# Patient Record
Sex: FEMALE | Race: WHITE | NOT HISPANIC OR LATINO | Employment: OTHER | URBAN - METROPOLITAN AREA
[De-identification: names, ages, dates, MRNs, and addresses within clinical notes are randomized per-mention and may not be internally consistent; named-entity substitution may affect disease eponyms.]

---

## 2017-01-16 ENCOUNTER — OFFICE VISIT (OUTPATIENT)
Dept: CARDIOLOGY | Facility: CLINIC | Age: 57
End: 2017-01-16
Payer: MEDICARE

## 2017-01-16 ENCOUNTER — HOSPITAL ENCOUNTER (OUTPATIENT)
Dept: RADIOLOGY | Facility: HOSPITAL | Age: 57
Discharge: HOME | End: 2017-01-16
Attending: INTERNAL MEDICINE
Payer: MEDICARE

## 2017-01-16 ENCOUNTER — HOSPITAL ENCOUNTER (OUTPATIENT)
Dept: CARDIOLOGY | Facility: CLINIC | Age: 57
Discharge: HOME | End: 2017-01-16
Payer: MEDICARE

## 2017-01-16 VITALS
DIASTOLIC BLOOD PRESSURE: 72 MMHG | WEIGHT: 145.75 LBS | HEIGHT: 65 IN | HEART RATE: 65 BPM | SYSTOLIC BLOOD PRESSURE: 142 MMHG | BODY MASS INDEX: 24.28 KG/M2

## 2017-01-16 DIAGNOSIS — I71.20 THORACIC AORTIC ANEURYSM WITHOUT RUPTURE: ICD-10-CM

## 2017-01-16 DIAGNOSIS — Z98.890 S/P THORACIC AORTIC ANEURYSM REPAIR: Chronic | ICD-10-CM

## 2017-01-16 DIAGNOSIS — Z95.0 PACEMAKER: Chronic | ICD-10-CM

## 2017-01-16 DIAGNOSIS — I71.21 ASCENDING AORTIC ANEURYSM: Primary | ICD-10-CM

## 2017-01-16 DIAGNOSIS — Z86.79 S/P THORACIC AORTIC ANEURYSM REPAIR: Chronic | ICD-10-CM

## 2017-01-16 DIAGNOSIS — Z87.74 S/P VSD REPAIR: Chronic | ICD-10-CM

## 2017-01-16 DIAGNOSIS — Z95.2 S/P AVR: Chronic | ICD-10-CM

## 2017-01-16 LAB
AORTIC VALVE REGURGITATION: ABNORMAL
DIASTOLIC DYSFUNCTION: YES
ESTIMATED PA SYSTOLIC PRESSURE: 42
MITRAL VALVE REGURGITATION: ABNORMAL
RETIRED EF AND QEF - SEE NOTES: 60 (ref 55–65)
TRICUSPID VALVE REGURGITATION: ABNORMAL

## 2017-01-16 PROCEDURE — 99214 OFFICE O/P EST MOD 30 MIN: CPT | Mod: S$PBB,,, | Performed by: INTERNAL MEDICINE

## 2017-01-16 PROCEDURE — 71250 CT THORAX DX C-: CPT | Mod: 26,GC,, | Performed by: RADIOLOGY

## 2017-01-16 PROCEDURE — 99999 PR PBB SHADOW E&M-EST. PATIENT-LVL III: CPT | Mod: PBBFAC,,, | Performed by: INTERNAL MEDICINE

## 2017-01-16 PROCEDURE — 93306 TTE W/DOPPLER COMPLETE: CPT | Mod: 26,S$PBB,, | Performed by: INTERNAL MEDICINE

## 2017-01-16 PROCEDURE — 99213 OFFICE O/P EST LOW 20 MIN: CPT | Mod: PBBFAC | Performed by: INTERNAL MEDICINE

## 2017-01-16 RX ORDER — METOPROLOL SUCCINATE 50 MG/1
TABLET, EXTENDED RELEASE ORAL
Status: ON HOLD | COMMUNITY
End: 2023-09-14 | Stop reason: HOSPADM

## 2017-01-16 RX ORDER — SOTALOL HYDROCHLORIDE 80 MG/1
80 TABLET ORAL 2 TIMES DAILY
Status: ON HOLD | COMMUNITY
Start: 2017-01-04 | End: 2022-05-23 | Stop reason: SDUPTHER

## 2017-01-16 RX ORDER — VALSARTAN AND HYDROCHLOROTHIAZIDE 320; 12.5 MG/1; MG/1
1 TABLET, FILM COATED ORAL DAILY
COMMUNITY
End: 2019-01-28

## 2017-01-16 RX ORDER — ESOMEPRAZOLE MAGNESIUM 40 MG/1
40 CAPSULE, DELAYED RELEASE ORAL 2 TIMES DAILY
COMMUNITY
Start: 2016-12-19 | End: 2019-01-28

## 2017-01-16 NOTE — MR AVS SNAPSHOT
[facility]  [address]  [facility] [address]  Phone: [phone]                  Naina Keyes   2017 9:40 AM   Office Visit    Description:  Female : 1960   Provider:  Colton Gallegos MD   Department:  [facility]           Reason for Visit     Aortic Stenosis           Diagnoses this Visit        Comments    Ascending aortic aneurysm    -  Primary     Pacemaker         S/P AVR         S/P thoracic aortic aneurysm repair         S/P VSD repair                To Do List           Goals (5 Years of Data)     None      [facility]     [facility] -  Assistance  Registered nurses in the [facility] provide clinical advisement, health education, appointment booking, and other advisory services.  Call for this free service at [phone].             Medications           Message regarding Medications     Verify the changes and/or additions to your medication regime listed below are the same as discussed with your clinician today.  If any of these changes or additions are incorrect, please notify your healthcare provider.        STOP taking these medications     valsartan-hydrochlorothiazide (DIOVAN-HCT) 160-12.5 mg per tablet Take 1 tablet by mouth once daily.      ESTROGENS, CONJUGATED (PREMARIN ORAL) Take 0.625 mg by mouth once daily.            Verify that the below list of medications is an accurate representation of the medications you are currently taking.  If none reported, the list may be blank. If incorrect, please contact your healthcare provider. Carry this list with you in case of emergency.           Current Medications     ALBUTEROL SULFATE (VENTOLIN HFA INHL) Inhale into the lungs as needed.    alprazolam (XANAX) 0.25 MG tablet Take 0.5 mg by mouth once daily.     azelastine (OPTIVAR) 0.05 % ophthalmic solution Place 1 drop into both eyes as needed.     CITALOPRAM HYDROBROMIDE (CELEXA ORAL) Take  "10 mg/day by mouth 2 (two) times daily.     CYCLOBENZAPRINE HCL (FLEXERIL ORAL) Take by mouth as needed.     magnesium 200 mg Tab Take 400 mg by mouth once daily.    metoprolol succinate (TOPROL-XL) 100 MG 24 hr tablet Take 100 mg by mouth once daily. Pt  Take  1/2  Tab  In  Am  And  Pm    NEXIUM 40 mg capsule 40 mg 2 (two) times daily.     olopatadine (PATANASE) 0.6 % Spry by Nasal route once daily.      potassium chloride (MICRO-K) 10 MEQ CpSR Take 10 mEq by mouth 2 (two) times daily.     sotalol (BETAPACE) 80 MG tablet 80 mg 2 (two) times daily.     valsartan-hydrochlorothiazide (DIOVAN-HCT) 320-12.5 mg per tablet Take 1 tablet by mouth once daily.    WARFARIN SODIUM (COUMADIN ORAL) Take by mouth. Pt is taking 5 mg daily except on Saturday and Monday pt takes 2.5mg    zolpidem (AMBIEN) 10 mg tablet Take 10 mg by mouth nightly as needed.             Clinical Reference Information           Vital Signs - Last Recorded  Most recent update: 1/16/2017  9:49 AM by Dominick Pearl MA    BP Pulse Ht Wt BMI    (!) 142/72 65 5' 5" (1.651 m) 66.1 kg (145 lb 11.6 oz) 24.25 kg/m2      Blood Pressure          Most Recent Value    BP  (!)  142/72 [LT]      Allergies as of 1/16/2017     Iodine      Immunizations Administered on Date of Encounter - 1/16/2017     None      "

## 2017-01-16 NOTE — PROGRESS NOTES
Subjective:    Patient ID:  Naina Keyes is a 56 y.o. female who presents for follow-up of TAA graft leak closure with Amplatz    REF:  Kenny Newsome    [facility]  Ms. Keyes is essentially unchanged clinically:  57 y/o lady with hx of AVR for endocarditis and subsequent leak of graft who presents today for annual f/u s/p 10mm AGA septal occluder placed with partial closure of leak in June 2009 for a left post suture line aortic leak and peter fistula communicating to right atrium the occurred after thoracic aortic aneurysm repair with Dacron graft in 2007. She continues to have chronic fatigue, FLOOD, and CP that is not lifestyle limiting. No CHF in the past year.     CT without contrast today shows maximum diameter of the ascending aorta is 4.2cm.  No change since 2013.     Review of Systems   Constitution: Positive for malaise/fatigue. Negative for chills, diaphoresis, fever, weakness, weight gain and weight loss.        Complains of weak and dizzy spells.  Has a PPM put in by Dr. Newsome.   HENT: Negative for congestion, headaches and sore throat.    Eyes: Negative for blurred vision, vision loss in left eye, vision loss in right eye and visual disturbance.   Cardiovascular: Positive for dyspnea on exertion. Negative for chest pain, claudication, cyanosis, irregular heartbeat, leg swelling, near-syncope, orthopnea, palpitations, paroxysmal nocturnal dyspnea and syncope.   Respiratory: Positive for shortness of breath. Negative for cough, hemoptysis, sputum production and wheezing.    Endocrine: Negative for cold intolerance, heat intolerance and polyuria.   Hematologic/Lymphatic: Negative for adenopathy and bleeding problem. Does not bruise/bleed easily.   Skin: Negative for itching and rash.   Musculoskeletal: Negative for falls, joint swelling, muscle cramps, muscle weakness and myalgias.   Gastrointestinal: Negative for abdominal pain, change in bowel habit, constipation, diarrhea, hematochezia, melena, nausea and  vomiting.   Genitourinary: Negative for bladder incontinence, dysuria and hematuria.   Neurological: Negative for dizziness, focal weakness, light-headedness, loss of balance and numbness.   Psychiatric/Behavioral: Negative for altered mental status and depression. The patient is not nervous/anxious.      Past Medical History   Diagnosis Date    Heart disease     Hypertension     Pacemaker 11/25/2013    S/P Mechanical AVR 11/25/2013    S/P thoracic aortic aneurysm repair with Dacron graft (2007) 11/25/2013    S/P VSD surgical patch repair -1997 11/25/2013     Current Outpatient Prescriptions on File Prior to Visit   Medication Sig Dispense Refill    alprazolam (XANAX) 0.25 MG tablet Take 0.5 mg by mouth once daily.       azelastine (OPTIVAR) 0.05 % ophthalmic solution Place 1 drop into both eyes as needed.       CITALOPRAM HYDROBROMIDE (CELEXA ORAL) Take 10 mg/day by mouth 2 (two) times daily.       CYCLOBENZAPRINE HCL (FLEXERIL ORAL) Take by mouth as needed.       magnesium 200 mg Tab Take 400 mg by mouth once daily.      olopatadine (PATANASE) 0.6 % Spry by Nasal route once daily.        potassium chloride (MICRO-K) 10 MEQ CpSR Take 10 mEq by mouth 2 (two) times daily.       WARFARIN SODIUM (COUMADIN ORAL) Take by mouth. Pt is taking 5 mg daily except on Saturday and Monday pt takes 2.5mg      zolpidem (AMBIEN) 10 mg tablet Take 10 mg by mouth nightly as needed.        [DISCONTINUED] ESTROGENS, CONJUGATED (PREMARIN ORAL) Take 0.625 mg by mouth once daily.       [DISCONTINUED] metoprolol succinate (TOPROL-XL) 25 MG 24 hr tablet Take 100 mg by mouth once daily. 50mg in a.m. And 100mg in evening      [DISCONTINUED] valsartan-hydrochlorothiazide (DIOVAN-HCT) 160-12.5 mg per tablet Take 1 tablet by mouth once daily.         Current Facility-Administered Medications on File Prior to Visit   Medication Dose Route Frequency Provider Last Rate Last Dose    dexamethasone injection 2 mg  2 mg Other Once  "Alli Simeon, [facility]         Vitals:    01/16/17 0929 01/16/17 0948   BP: (!) 140/66 (!) 142/72   Pulse: 65    Weight: 66.1 kg (145 lb 11.6 oz)    Height: 5' 5" (1.651 m)      Body mass index is 24.25 kg/(m^2).       Objective:    Physical Exam   Constitutional: She is oriented to person, place, and time. She appears well-developed and well-nourished. No distress.   HENT:   Head: Normocephalic and atraumatic.   Mouth/Throat: Oropharynx is clear and moist.   Eyes: Conjunctivae and EOM are normal. Pupils are equal, round, and reactive to light. No scleral icterus.   Neck: Neck supple. No JVD present. No tracheal deviation present.   Cardiovascular: Normal rate and regular rhythm.  Exam reveals no gallop and no friction rub.    Murmur heard.  Pulses:       Carotid pulses are 2+ on the right side, and 2+ on the left side.       Femoral pulses are 2+ on the right side, and 2+ on the left side.  Pulmonary/Chest: Effort normal and breath sounds normal. No respiratory distress. She has no wheezes. She has no rales. She exhibits no tenderness.   Abdominal: Soft. Bowel sounds are normal. She exhibits no distension. There is no hepatosplenomegaly. There is no tenderness.   Musculoskeletal: She exhibits no edema or tenderness.   Neurological: She is alert and oriented to person, place, and time.   Skin: Skin is warm and dry. No rash noted. No erythema.   Psychiatric: She has a normal mood and affect. Her behavior is normal. Judgment and thought content normal.         Assessment:       1. 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  One episode of CHF  2. 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 On Coumadin.   3. Pacemaker - no issues  4. " S/P VSD surgical patch repair -1997   5. Ascending aortic aneurysm - 4 cm, stable     Plan:       F/U in 1 year with non-contrast CT (history of shock with IV contrast) and 2DE with CFD  If she has recurrent CHF will consider closing the surgical Ao to RA AVF.      Staff:  I have personally taken the history and examined this patient and agree with the fellow's note as stated above and amended it accordingly :-) She has no change in symptoms and still has an aortic to RA shunt which was surgically created at the time of her aortic surgery.  She has stable fatigue and FLOOD with maximal exertion.  Will discuss the case with Dr. Mackay.

## 2017-11-09 DIAGNOSIS — Z95.2 S/P AVR: Chronic | ICD-10-CM

## 2017-11-09 DIAGNOSIS — Z86.79 S/P THORACIC AORTIC ANEURYSM REPAIR: Primary | Chronic | ICD-10-CM

## 2017-11-09 DIAGNOSIS — Z98.890 S/P THORACIC AORTIC ANEURYSM REPAIR: Primary | Chronic | ICD-10-CM

## 2018-01-22 ENCOUNTER — OFFICE VISIT (OUTPATIENT)
Dept: CARDIOLOGY | Facility: CLINIC | Age: 58
End: 2018-01-22
Payer: MEDICARE

## 2018-01-22 ENCOUNTER — HOSPITAL ENCOUNTER (OUTPATIENT)
Dept: RADIOLOGY | Facility: HOSPITAL | Age: 58
Discharge: HOME | End: 2018-01-22
Attending: NURSE PRACTITIONER
Payer: MEDICARE

## 2018-01-22 ENCOUNTER — HOSPITAL ENCOUNTER (OUTPATIENT)
Dept: CARDIOLOGY | Facility: CLINIC | Age: 58
Discharge: HOME | End: 2018-01-22
Attending: NURSE PRACTITIONER
Payer: MEDICARE

## 2018-01-22 VITALS
DIASTOLIC BLOOD PRESSURE: 65 MMHG | HEART RATE: 65 BPM | WEIGHT: 152.31 LBS | HEIGHT: 65 IN | BODY MASS INDEX: 25.38 KG/M2 | OXYGEN SATURATION: 97 % | SYSTOLIC BLOOD PRESSURE: 136 MMHG

## 2018-01-22 DIAGNOSIS — Z87.74 S/P VSD REPAIR: Chronic | ICD-10-CM

## 2018-01-22 DIAGNOSIS — Z86.79 S/P THORACIC AORTIC ANEURYSM REPAIR: Chronic | ICD-10-CM

## 2018-01-22 DIAGNOSIS — Z98.890 S/P THORACIC AORTIC ANEURYSM REPAIR: Chronic | ICD-10-CM

## 2018-01-22 DIAGNOSIS — Z95.2 S/P AVR: Chronic | ICD-10-CM

## 2018-01-22 DIAGNOSIS — Z95.2 S/P AVR: Primary | Chronic | ICD-10-CM

## 2018-01-22 DIAGNOSIS — I71.21 ASCENDING AORTIC ANEURYSM: ICD-10-CM

## 2018-01-22 DIAGNOSIS — Z95.0 PACEMAKER: Chronic | ICD-10-CM

## 2018-01-22 LAB
DIASTOLIC DYSFUNCTION: YES
ESTIMATED PA SYSTOLIC PRESSURE: 28.2
MITRAL VALVE MOBILITY: NORMAL
RETIRED EF AND QEF - SEE NOTES: 55 (ref 55–65)
TRICUSPID VALVE REGURGITATION: ABNORMAL

## 2018-01-22 PROCEDURE — 71250 CT THORAX DX C-: CPT | Mod: 26,GC,, | Performed by: RADIOLOGY

## 2018-01-22 PROCEDURE — 99214 OFFICE O/P EST MOD 30 MIN: CPT | Mod: S$PBB,,, | Performed by: INTERNAL MEDICINE

## 2018-01-22 PROCEDURE — 99215 OFFICE O/P EST HI 40 MIN: CPT | Mod: PBBFAC,25 | Performed by: INTERNAL MEDICINE

## 2018-01-22 PROCEDURE — 71250 CT THORAX DX C-: CPT | Mod: TC

## 2018-01-22 PROCEDURE — 99999 PR PBB SHADOW E&M-EST. PATIENT-LVL V: CPT | Mod: PBBFAC,,, | Performed by: INTERNAL MEDICINE

## 2018-01-22 PROCEDURE — 93306 TTE W/DOPPLER COMPLETE: CPT | Mod: PBBFAC | Performed by: INTERNAL MEDICINE

## 2018-01-22 NOTE — PROGRESS NOTES
Subjective:    Patient ID:  Naina Keyes is a 57 y.o. female who presents for follow-up of TAA graft leak closure with Amplatz    REF:  Kenny Newsome    [facility]  Ms. Keyes is essentially unchanged clinically:  55 y/o lady with hx of AVR for endocarditis and subsequent leak of graft who presents today for annual f/u s/p 10mm AGA septal occluder placed with partial closure of leak in June 2009 for a left post suture line aortic leak and peter fistula communicating to right atrium the occurred after thoracic aortic aneurysm repair with Dacron graft in 2007. She continues to have chronic fatigue, FLOOD, and CP that is not lifestyle limiting. No CHF in the past year.     CT without contrast today shows maximum diameter of the ascending aorta is 4.1 cm.  No change since 2013.     Review of Systems   Constitution: Positive for malaise/fatigue. Negative for chills, diaphoresis, fever, weakness, weight gain and weight loss.        Complains of weak and dizzy spells.  Has a PPM put in by Dr. Newsome.   HENT: Negative for congestion and sore throat.    Eyes: Negative for blurred vision, vision loss in left eye, vision loss in right eye and visual disturbance.   Cardiovascular: Positive for dyspnea on exertion. Negative for chest pain, claudication, cyanosis, irregular heartbeat, leg swelling, near-syncope, orthopnea, palpitations, paroxysmal nocturnal dyspnea and syncope.   Respiratory: Positive for shortness of breath. Negative for cough, hemoptysis, sputum production and wheezing.    Endocrine: Negative for cold intolerance, heat intolerance and polyuria.   Hematologic/Lymphatic: Negative for adenopathy and bleeding problem. Does not bruise/bleed easily.   Skin: Negative for itching and rash.   Musculoskeletal: Negative for falls, joint swelling, muscle cramps, muscle weakness and myalgias.   Gastrointestinal: Negative for abdominal pain, change in bowel habit, constipation, diarrhea, hematochezia, melena, nausea and vomiting.    Genitourinary: Negative for bladder incontinence, dysuria and hematuria.   Neurological: Negative for dizziness, focal weakness, headaches, light-headedness, loss of balance and numbness.   Psychiatric/Behavioral: Negative for altered mental status and depression. The patient is not nervous/anxious.      Past Medical History:   Diagnosis Date    Heart disease     Hypertension     Pacemaker 11/25/2013    S/P Mechanical AVR 11/25/2013    S/P thoracic aortic aneurysm repair with Dacron graft (2007) 11/25/2013    S/P VSD surgical patch repair -1997 11/25/2013     Current Outpatient Prescriptions on File Prior to Visit   Medication Sig Dispense Refill    ALBUTEROL SULFATE (VENTOLIN HFA INHL) Inhale into the lungs as needed.      alprazolam (XANAX) 0.25 MG tablet Take 0.5 mg by mouth once daily.       magnesium 200 mg Tab Take 400 mg by mouth once daily.      metoprolol succinate (TOPROL-XL) 100 MG 24 hr tablet Take 100 mg by mouth once daily. Pt  Take  1/2  Tab  In  Am  And  Pm      NEXIUM 40 mg capsule 40 mg 2 (two) times daily.       potassium chloride (MICRO-K) 10 MEQ CpSR Take 10 mEq by mouth 2 (two) times daily.       sotalol (BETAPACE) 80 MG tablet 80 mg 2 (two) times daily.       valsartan-hydrochlorothiazide (DIOVAN-HCT) 320-12.5 mg per tablet Take 1 tablet by mouth once daily.      WARFARIN SODIUM (COUMADIN ORAL) Take by mouth. Pt is taking 5 mg daily except on Saturday and Monday pt takes 2.5mg      zolpidem (AMBIEN) 10 mg tablet Take 10 mg by mouth nightly as needed.        azelastine (OPTIVAR) 0.05 % ophthalmic solution Place 1 drop into both eyes as needed.       CITALOPRAM HYDROBROMIDE (CELEXA ORAL) Take 10 mg/day by mouth 2 (two) times daily.       CYCLOBENZAPRINE HCL (FLEXERIL ORAL) Take by mouth as needed.       olopatadine (PATANASE) 0.6 % Spry by Nasal route once daily.         Current Facility-Administered Medications on File Prior to Visit   Medication Dose Route Frequency  "Provider Last Rate Last Dose    dexamethasone injection 2 mg  2 mg Other Once Alli SimeonYOHANA         Vitals:    01/22/18 1122 01/22/18 1125   BP: 136/68 136/65   BP Location: Left arm Right arm   Patient Position: Sitting Sitting   BP Method: Large (Automatic) Large (Automatic)   Pulse: 65 65   SpO2: 97% 97%   Weight: 69.1 kg (152 lb 5.4 oz) 69.1 kg (152 lb 5.4 oz)   Height: 5' 5" (1.651 m) 5' 5" (1.651 m)     Body mass index is 25.35 kg/m².       Objective:    Physical Exam   Constitutional: She is oriented to person, place, and time. She appears well-developed and well-nourished. No distress.   HENT:   Head: Normocephalic and atraumatic.   Mouth/Throat: Oropharynx is clear and moist.   Eyes: Conjunctivae and EOM are normal. Pupils are equal, round, and reactive to light. No scleral icterus.   Neck: Neck supple. No JVD present. No tracheal deviation present.   Cardiovascular: Normal rate and regular rhythm.  Exam reveals no gallop and no friction rub.    Murmur heard.  Pulses:       Carotid pulses are 2+ on the right side, and 2+ on the left side.       Femoral pulses are 2+ on the right side, and 2+ on the left side.  Pulmonary/Chest: Effort normal and breath sounds normal. No respiratory distress. She has no wheezes. She has no rales. She exhibits no tenderness.   Abdominal: Soft. Bowel sounds are normal. She exhibits no distension. There is no hepatosplenomegaly. There is no tenderness.   Musculoskeletal: She exhibits no edema or tenderness.   Neurological: She is alert and oriented to person, place, and time.   Skin: Skin is warm and dry. No rash noted. No erythema.   Psychiatric: She has a normal mood and affect. Her behavior is normal. Judgment and thought content normal.         Assessment:       1. S/P thoracic aortic aneurysm repair with Dacron graft (2007) with subsequent left post suture line aortic leak and peter  fistula communicating to right atrium s/p 10mm AGA septal occluder placed with " partial closure of leak in June 2009 .   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  One episode of CHF  2. 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 On Coumadin.   3. Pacemaker - no issues  4. S/P VSD surgical patch repair -1997   5. Ascending aortic aneurysm - 4 cm, stable     Plan:       F/U in 1 year with non-contrast CT (history of shock with IV contrast) and 2DE with CFD  If she has recurrent CHF will consider closing the surgical Ao to RA AVF.    Staff:  I have personally taken the history and examined this patient and agree with the fellow's note as stated above and amended it accordingly :-)

## 2018-12-05 DIAGNOSIS — I71.21 ASCENDING AORTIC ANEURYSM: Primary | ICD-10-CM

## 2019-01-28 ENCOUNTER — HOSPITAL ENCOUNTER (OUTPATIENT)
Dept: CARDIOLOGY | Facility: CLINIC | Age: 59
Discharge: HOME | End: 2019-01-28
Attending: INTERNAL MEDICINE
Payer: MEDICARE

## 2019-01-28 ENCOUNTER — OFFICE VISIT (OUTPATIENT)
Dept: CARDIOLOGY | Facility: CLINIC | Age: 59
End: 2019-01-28
Payer: MEDICARE

## 2019-01-28 ENCOUNTER — HOSPITAL ENCOUNTER (OUTPATIENT)
Dept: RADIOLOGY | Facility: HOSPITAL | Age: 59
Discharge: HOME | End: 2019-01-28
Attending: INTERNAL MEDICINE
Payer: MEDICARE

## 2019-01-28 VITALS
BODY MASS INDEX: 24.2 KG/M2 | OXYGEN SATURATION: 100 % | DIASTOLIC BLOOD PRESSURE: 74 MMHG | WEIGHT: 150.56 LBS | HEART RATE: 65 BPM | SYSTOLIC BLOOD PRESSURE: 162 MMHG | HEIGHT: 66 IN

## 2019-01-28 VITALS
WEIGHT: 145 LBS | SYSTOLIC BLOOD PRESSURE: 144 MMHG | DIASTOLIC BLOOD PRESSURE: 80 MMHG | HEART RATE: 64 BPM | BODY MASS INDEX: 24.16 KG/M2 | HEIGHT: 65 IN

## 2019-01-28 DIAGNOSIS — I71.21 ASCENDING AORTIC ANEURYSM: ICD-10-CM

## 2019-01-28 DIAGNOSIS — Z95.2 S/P AVR: Primary | Chronic | ICD-10-CM

## 2019-01-28 LAB
ASCENDING AORTA: 4.13 CM
AV INDEX (PROSTH): 0.42
AV MEAN GRADIENT: 15 MMHG
AV PEAK GRADIENT: 33.64 MMHG
AV VALVE AREA: 1.69 CM2
AV VELOCITY RATIO: 0.43
BSA FOR ECHO PROCEDURE: 1.74 M2
CV ECHO LV RWT: 0.32 CM
DOP CALC AO PEAK VEL: 2.9 M/S
DOP CALC AO VTI: 63.95 CM
DOP CALC LVOT AREA: 4.01 CM2
DOP CALC LVOT DIAMETER: 2.26 CM
DOP CALC LVOT PEAK VEL: 1.25 M/S
DOP CALC LVOT STROKE VOLUME: 108.26 CM3
DOP CALCLVOT PEAK VEL VTI: 27 CM
E WAVE DECELERATION TIME: 175.73 MSEC
E/A RATIO: 1.37
E/E' RATIO: 16.67
ECHO LV POSTERIOR WALL: 0.86 CM (ref 0.6–1.1)
FRACTIONAL SHORTENING: 32 % (ref 28–44)
INTERVENTRICULAR SEPTUM: 1.03 CM (ref 0.6–1.1)
LA MAJOR: 5.67 CM
LA MINOR: 6.18 CM
LA WIDTH: 4.56 CM
LEFT ATRIUM SIZE: 3.57 CM
LEFT ATRIUM VOLUME INDEX: 47.4 ML/M2
LEFT ATRIUM VOLUME: 81.83 CM3
LEFT INTERNAL DIMENSION IN SYSTOLE: 3.69 CM (ref 2.1–4)
LEFT VENTRICLE DIASTOLIC VOLUME INDEX: 82.5 ML/M2
LEFT VENTRICLE DIASTOLIC VOLUME: 142.34 ML
LEFT VENTRICLE MASS INDEX: 111.9 G/M2
LEFT VENTRICLE SYSTOLIC VOLUME INDEX: 33.4 ML/M2
LEFT VENTRICLE SYSTOLIC VOLUME: 57.6 ML
LEFT VENTRICULAR INTERNAL DIMENSION IN DIASTOLE: 5.42 CM (ref 3.5–6)
LEFT VENTRICULAR MASS: 193.12 G
LV LATERAL E/E' RATIO: 12.5
LV SEPTAL E/E' RATIO: 25
MV PEAK A VEL: 0.91 M/S
MV PEAK E VEL: 1.25 M/S
PISA TR MAX VEL: 2.81 M/S
RA MAJOR: 5.82 CM
RA PRESSURE: 15 MMHG
RA WIDTH: 4.08 CM
RIGHT VENTRICULAR END-DIASTOLIC DIMENSION: 3.96 CM
SINUS: 2.97 CM
STJ: 3.08 CM
TDI LATERAL: 0.1
TDI SEPTAL: 0.05
TDI: 0.08
TR MAX PG: 31.58 MMHG
TRICUSPID ANNULAR PLANE SYSTOLIC EXCURSION: 2.06 CM
TV REST PULMONARY ARTERY PRESSURE: 47 MMHG

## 2019-01-28 PROCEDURE — 71250 CT CHEST WITHOUT CONTRAST: ICD-10-PCS | Mod: 26,,, | Performed by: RADIOLOGY

## 2019-01-28 PROCEDURE — 71250 CT THORAX DX C-: CPT | Mod: TC

## 2019-01-28 PROCEDURE — 93306 TRANSTHORACIC ECHO (TTE) COMPLETE (CUPID ONLY): ICD-10-PCS | Mod: 26,S$PBB,, | Performed by: INTERNAL MEDICINE

## 2019-01-28 PROCEDURE — 99999 PR PBB SHADOW E&M-EST. PATIENT-LVL IV: CPT | Mod: PBBFAC,,, | Performed by: INTERNAL MEDICINE

## 2019-01-28 PROCEDURE — 93306 TTE W/DOPPLER COMPLETE: CPT | Mod: PBBFAC | Performed by: INTERNAL MEDICINE

## 2019-01-28 PROCEDURE — 99212 PR OFFICE/OUTPT VISIT, EST, LEVL II, 10-19 MIN: ICD-10-PCS | Mod: S$PBB,,, | Performed by: INTERNAL MEDICINE

## 2019-01-28 PROCEDURE — 99212 OFFICE O/P EST SF 10 MIN: CPT | Mod: S$PBB,,, | Performed by: INTERNAL MEDICINE

## 2019-01-28 PROCEDURE — 99999 PR PBB SHADOW E&M-EST. PATIENT-LVL IV: ICD-10-PCS | Mod: PBBFAC,,, | Performed by: INTERNAL MEDICINE

## 2019-01-28 PROCEDURE — 71250 CT THORAX DX C-: CPT | Mod: 26,,, | Performed by: RADIOLOGY

## 2019-01-28 PROCEDURE — 99214 OFFICE O/P EST MOD 30 MIN: CPT | Mod: PBBFAC,25 | Performed by: INTERNAL MEDICINE

## 2019-01-28 RX ORDER — VALSARTAN 160 MG/1
160 TABLET ORAL DAILY
Refills: 1 | Status: ON HOLD | COMMUNITY
Start: 2018-10-25 | End: 2022-05-18

## 2019-01-28 NOTE — PROGRESS NOTES
Subjective:    Patient ID:  Naina Keyes is a 58 y.o. female who presents for follow-up of TAA graft leak closure with Amplatz    REF:  Kenny Newsome    [facility]  Ms. Keyes is essentially unchanged clinically:  55 y/o lady with hx of AVR for endocarditis and subsequent leak of graft who presents today for annual f/u s/p 10mm AGA septal occluder placed with partial closure of leak in June 2009 for a left post suture line aortic leak and peter fistula communicating to right atrium the occurred after thoracic aortic aneurysm repair with Dacron graft in 2007. She continues to have chronic fatigue, FLOOD, and CP that is not lifestyle limiting. No CHF in the past year.     CT without contrast today shows maximum diameter of the ascending aorta is 4.1 cm.  No change since 2013.     Review of Systems   Constitution: Positive for malaise/fatigue. Negative for chills, diaphoresis, fever, weakness, weight gain and weight loss.        Complains of weak and dizzy spells.  Has a PPM put in by Dr. Newsome.   HENT: Negative for congestion and sore throat.    Eyes: Negative for blurred vision, vision loss in left eye, vision loss in right eye and visual disturbance.   Cardiovascular: Positive for dyspnea on exertion. Negative for chest pain, claudication, cyanosis, irregular heartbeat, leg swelling, near-syncope, orthopnea, palpitations, paroxysmal nocturnal dyspnea and syncope.   Respiratory: Positive for shortness of breath. Negative for cough, hemoptysis, sputum production and wheezing.    Endocrine: Negative for cold intolerance, heat intolerance and polyuria.   Hematologic/Lymphatic: Negative for adenopathy and bleeding problem. Does not bruise/bleed easily.   Skin: Negative for itching and rash.   Musculoskeletal: Negative for falls, joint swelling, muscle cramps, muscle weakness and myalgias.   Gastrointestinal: Negative for abdominal pain, change in bowel habit, constipation, diarrhea, hematochezia, melena, nausea and vomiting.    Genitourinary: Negative for bladder incontinence, dysuria and hematuria.   Neurological: Negative for dizziness, focal weakness, headaches, light-headedness, loss of balance and numbness.   Psychiatric/Behavioral: Negative for altered mental status and depression. The patient is not nervous/anxious.      Past Medical History:   Diagnosis Date    Heart disease     Hypertension     Pacemaker 11/25/2013    S/P Mechanical AVR 11/25/2013    S/P thoracic aortic aneurysm repair with Dacron graft (2007) 11/25/2013    S/P VSD surgical patch repair -1997 11/25/2013     Current Outpatient Medications on File Prior to Visit   Medication Sig Dispense Refill    ALBUTEROL SULFATE (VENTOLIN HFA INHL) Inhale into the lungs as needed.      alprazolam (XANAX) 0.25 MG tablet Take 0.5 mg by mouth as needed.       magnesium 200 mg Tab Take 500 mg by mouth once daily.       metoprolol succinate (TOPROL-XL) 100 MG 24 hr tablet Take by mouth once daily. Pt  Take  1/2  Tab  In  Am  And  Pm       olopatadine (PATANASE) 0.6 % Spry 1 spray by Nasal route once daily.       potassium chloride (MICRO-K) 10 MEQ CpSR Take 10 mEq by mouth once daily.       sotalol (BETAPACE) 80 MG tablet 80 mg 2 (two) times daily.       valsartan (DIOVAN) 160 MG tablet Take 160 mg by mouth once daily.  1    WARFARIN SODIUM (COUMADIN ORAL) Take 5 mg by mouth once daily. Pt is taking 5 mg daily except on Saturday and Monday pt takes 2.5mg      zolpidem (AMBIEN) 10 mg tablet Take 10 mg by mouth nightly as needed.        [DISCONTINUED] azelastine (OPTIVAR) 0.05 % ophthalmic solution Place 1 drop into both eyes as needed.       [DISCONTINUED] CITALOPRAM HYDROBROMIDE (CELEXA ORAL) Take 10 mg/day by mouth 2 (two) times daily.       [DISCONTINUED] CYCLOBENZAPRINE HCL (FLEXERIL ORAL) Take by mouth as needed.       [DISCONTINUED] NEXIUM 40 mg capsule 40 mg 2 (two) times daily.       [DISCONTINUED] valsartan-hydrochlorothiazide (DIOVAN-HCT) 320-12.5 mg  "per tablet Take 1 tablet by mouth once daily.       Current Facility-Administered Medications on File Prior to Visit   Medication Dose Route Frequency Provider Last Rate Last Dose    dexamethasone injection 2 mg  2 mg Other Once Alli Simeon DPM         Vitals:    01/28/19 1108 01/28/19 1116   BP: (!) 144/65 (!) 162/74   BP Location: Right arm Left arm   Patient Position: Sitting Sitting   BP Method: Large (Automatic) Large (Automatic)   Pulse: 65 65   SpO2: 100%    Weight: 68.3 kg (150 lb 9.2 oz)    Height: 5' 6" (1.676 m)      Body mass index is 24.3 kg/m².       Objective:    Physical Exam   Constitutional: She is oriented to person, place, and time. She appears well-developed and well-nourished. No distress.   HENT:   Head: Normocephalic and atraumatic.   Mouth/Throat: Oropharynx is clear and moist.   Eyes: Conjunctivae and EOM are normal. Pupils are equal, round, and reactive to light. No scleral icterus.   Neck: Neck supple. No JVD present. No tracheal deviation present.   Cardiovascular: Normal rate and regular rhythm. Exam reveals no gallop and no friction rub.   Murmur heard.  Pulses:       Carotid pulses are 2+ on the right side, and 2+ on the left side.       Femoral pulses are 2+ on the right side, and 2+ on the left side.  Pulmonary/Chest: Effort normal and breath sounds normal. No respiratory distress. She has no wheezes. She has no rales. She exhibits no tenderness.   Abdominal: Soft. Bowel sounds are normal. She exhibits no distension. There is no hepatosplenomegaly. There is no tenderness.   Musculoskeletal: She exhibits no edema or tenderness.   Neurological: She is alert and oriented to person, place, and time.   Skin: Skin is warm and dry. No rash noted. No erythema.   Psychiatric: She has a normal mood and affect. Her behavior is normal. Judgment and thought content normal.         Assessment:       1. S/P thoracic aortic aneurysm repair with Dacron graft (2007) with subsequent left post " suture line aortic leak and peter  fistula communicating to right atrium s/p 10mm AGA septal occluder placed with partial closure of leak in June 2009 .   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  One episode of CHF  2. 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 On Coumadin.   3. Pacemaker - no issues  4. S/P VSD surgical patch repair -1997   5. Ascending aortic aneurysm - 4 cm, stable     Plan:       F/U in 1 year with non-contrast CT (history of shock with IV contrast) and 2DE with CFD  If she has recurrent CHF will consider closing the surgical Ao to RA AVF.  Reviewed with Hakeem Mackay who agrees with this plan.

## 2019-12-30 DIAGNOSIS — Z95.2 S/P AVR: Primary | ICD-10-CM

## 2019-12-30 DIAGNOSIS — I71.20 THORACIC AORTIC ANEURYSM WITHOUT RUPTURE: ICD-10-CM

## 2020-01-27 ENCOUNTER — HOSPITAL ENCOUNTER (OUTPATIENT)
Dept: RADIOLOGY | Facility: HOSPITAL | Age: 60
Discharge: HOME | End: 2020-01-27
Attending: INTERNAL MEDICINE
Payer: MEDICARE

## 2020-01-27 ENCOUNTER — OFFICE VISIT (OUTPATIENT)
Dept: CARDIOLOGY | Facility: CLINIC | Age: 60
End: 2020-01-27
Payer: MEDICARE

## 2020-01-27 ENCOUNTER — HOSPITAL ENCOUNTER (OUTPATIENT)
Dept: CARDIOLOGY | Facility: CLINIC | Age: 60
Discharge: HOME | End: 2020-01-27
Attending: INTERNAL MEDICINE
Payer: MEDICARE

## 2020-01-27 VITALS
DIASTOLIC BLOOD PRESSURE: 57 MMHG | SYSTOLIC BLOOD PRESSURE: 118 MMHG | OXYGEN SATURATION: 100 % | HEIGHT: 66 IN | WEIGHT: 140.19 LBS | HEART RATE: 64 BPM | BODY MASS INDEX: 22.53 KG/M2

## 2020-01-27 VITALS
HEART RATE: 65 BPM | HEIGHT: 66 IN | SYSTOLIC BLOOD PRESSURE: 122 MMHG | WEIGHT: 150 LBS | DIASTOLIC BLOOD PRESSURE: 64 MMHG | BODY MASS INDEX: 24.11 KG/M2

## 2020-01-27 DIAGNOSIS — I71.21 ASCENDING AORTIC ANEURYSM: Primary | ICD-10-CM

## 2020-01-27 DIAGNOSIS — Z98.890 S/P THORACIC AORTIC ANEURYSM REPAIR: Chronic | ICD-10-CM

## 2020-01-27 DIAGNOSIS — Z95.2 S/P AVR: Chronic | ICD-10-CM

## 2020-01-27 DIAGNOSIS — I71.20 THORACIC AORTIC ANEURYSM WITHOUT RUPTURE: ICD-10-CM

## 2020-01-27 DIAGNOSIS — Z95.2 S/P AVR: ICD-10-CM

## 2020-01-27 DIAGNOSIS — Z95.0 PACEMAKER: Chronic | ICD-10-CM

## 2020-01-27 DIAGNOSIS — I71.20 THORACIC AORTIC ANEURYSM WITHOUT RUPTURE: Primary | ICD-10-CM

## 2020-01-27 DIAGNOSIS — Z86.79 S/P THORACIC AORTIC ANEURYSM REPAIR: Chronic | ICD-10-CM

## 2020-01-27 DIAGNOSIS — Z87.74 S/P VSD REPAIR: Chronic | ICD-10-CM

## 2020-01-27 LAB
ASCENDING AORTA: 4 CM
AV INDEX (PROSTH): 0.46
AV MEAN GRADIENT: 15 MMHG
AV PEAK GRADIENT: 35 MMHG
AV VALVE AREA: 1.73 CM2
AV VELOCITY RATIO: 0.46
BSA FOR ECHO PROCEDURE: 1.78 M2
CV ECHO LV RWT: 0.33 CM
DOP CALC AO PEAK VEL: 2.94 M/S
DOP CALC AO VTI: 68.71 CM
DOP CALC LVOT AREA: 3.8 CM2
DOP CALC LVOT DIAMETER: 2.2 CM
DOP CALC LVOT PEAK VEL: 1.36 M/S
DOP CALC LVOT STROKE VOLUME: 119.07 CM3
DOP CALCLVOT PEAK VEL VTI: 31.34 CM
E WAVE DECELERATION TIME: 179.65 MSEC
E/A RATIO: 1.03
E/E' RATIO: 14.93 M/S
ECHO LV POSTERIOR WALL: 0.87 CM (ref 0.6–1.1)
FRACTIONAL SHORTENING: 26 % (ref 28–44)
INTERVENTRICULAR SEPTUM: 0.94 CM (ref 0.6–1.1)
IVRT: 0.09 MSEC
LA MAJOR: 4.99 CM
LA MINOR: 5.12 CM
LA WIDTH: 3.39 CM
LEFT ATRIUM SIZE: 3.48 CM
LEFT ATRIUM VOLUME INDEX: 28.6 ML/M2
LEFT ATRIUM VOLUME: 50.68 CM3
LEFT INTERNAL DIMENSION IN SYSTOLE: 3.92 CM (ref 2.1–4)
LEFT VENTRICLE DIASTOLIC VOLUME INDEX: 76.08 ML/M2
LEFT VENTRICLE DIASTOLIC VOLUME: 134.66 ML
LEFT VENTRICLE MASS INDEX: 99 G/M2
LEFT VENTRICLE SYSTOLIC VOLUME INDEX: 37.6 ML/M2
LEFT VENTRICLE SYSTOLIC VOLUME: 66.55 ML
LEFT VENTRICULAR INTERNAL DIMENSION IN DIASTOLE: 5.29 CM (ref 3.5–6)
LEFT VENTRICULAR MASS: 175.22 G
LV LATERAL E/E' RATIO: 12.44 M/S
LV SEPTAL E/E' RATIO: 18.67 M/S
MV PEAK A VEL: 1.09 M/S
MV PEAK E VEL: 1.12 M/S
PISA TR MAX VEL: 2.54 M/S
PULM VEIN S/D RATIO: 0.59
PV PEAK D VEL: 0.76 M/S
PV PEAK S VEL: 0.45 M/S
RA MAJOR: 5.02 CM
RA PRESSURE: 3 MMHG
RA WIDTH: 4.05 CM
RIGHT VENTRICULAR END-DIASTOLIC DIMENSION: 4.6 CM
RV TISSUE DOPPLER FREE WALL SYSTOLIC VELOCITY 1 (APICAL 4 CHAMBER VIEW): 8 CM/S
SINUS: 4.1 CM
TDI LATERAL: 0.09 M/S
TDI SEPTAL: 0.06 M/S
TDI: 0.08 M/S
TR MAX PG: 26 MMHG
TRICUSPID ANNULAR PLANE SYSTOLIC EXCURSION: 2.05 CM
TV REST PULMONARY ARTERY PRESSURE: 29 MMHG

## 2020-01-27 PROCEDURE — 71250 CT THORAX DX C-: CPT | Mod: 26,,, | Performed by: RADIOLOGY

## 2020-01-27 PROCEDURE — 71250 CT CHEST WITHOUT CONTRAST: ICD-10-PCS | Mod: 26,,, | Performed by: RADIOLOGY

## 2020-01-27 PROCEDURE — 99999 PR PBB SHADOW E&M-EST. PATIENT-LVL IV: ICD-10-PCS | Mod: PBBFAC,,, | Performed by: INTERNAL MEDICINE

## 2020-01-27 PROCEDURE — 93306 ECHO (CUPID ONLY): ICD-10-PCS | Mod: 26,S$PBB,, | Performed by: INTERNAL MEDICINE

## 2020-01-27 PROCEDURE — 99213 OFFICE O/P EST LOW 20 MIN: CPT | Mod: S$PBB,,, | Performed by: INTERNAL MEDICINE

## 2020-01-27 PROCEDURE — 99214 OFFICE O/P EST MOD 30 MIN: CPT | Mod: PBBFAC,25 | Performed by: INTERNAL MEDICINE

## 2020-01-27 PROCEDURE — 99213 PR OFFICE/OUTPT VISIT, EST, LEVL III, 20-29 MIN: ICD-10-PCS | Mod: S$PBB,,, | Performed by: INTERNAL MEDICINE

## 2020-01-27 PROCEDURE — 93306 TTE W/DOPPLER COMPLETE: CPT | Mod: PBBFAC | Performed by: INTERNAL MEDICINE

## 2020-01-27 PROCEDURE — 71250 CT THORAX DX C-: CPT | Mod: TC

## 2020-01-27 PROCEDURE — 99999 PR PBB SHADOW E&M-EST. PATIENT-LVL IV: CPT | Mod: PBBFAC,,, | Performed by: INTERNAL MEDICINE

## 2020-01-27 NOTE — PROGRESS NOTES
Subjective:    Patient ID:  Naina Keyes is a 59 y.o. female who presents for follow-up of Ascending aortic aneurysm repair and mechanical AVR.    REF:  Kenny Newsome     HPI 57 y/o lady with hx of AVR and aortic tube graft for endocarditis in 2009.  Post operatively she had a proximal anastomosis leak treated with a 10mm  Amplatzer Septal Occluder in 2007.  She presents today for annual f/u.  Both Codie and Juan Ramon think she is surgically inoperable because her aorta is stuck against the sternum.     She comes to clinic today for one year follow up. She is essentially unchanged clinically and continues to have chronic fatigue and FLOOD that is not lifestyle limiting. No CHF in the past year.     CT without contrast today:  · Maximum diameter of the ascending aorta is 4.2 cm. Last year measured 4.1 cm. Only 0.3 cm change since 2013.     TTE today:   · Eccentric left ventricular hypertrophy.  · Normal left ventricular systolic function. The estimated ejection fraction is 55%.  · Mild right ventricular enlargement.  · Normal right ventricular systolic function.  · Indeterminate left ventricular diastolic function.  · S/p AVR with aortic root replacement and subsequent leak of graft s/p 10 mm AGA septal occluder placed with partial closure of leak in June of 2009.  · The ascending aorta is mildly dilated at the level where the septal occluder located. There is flow through this area in systole with no regurgitation noted in diastole (see image 8.)  · There is a mechanical aortic valve present. There is mild aortic insufficiency present. Mean gradient across the valve is 15 mm Hg.  · Mild mitral regurgitation.  · Mild tricuspid regurgitation.  · The estimated PA systolic pressure is 29 mmHg.  · Normal central venous pressure (3 mmHg).    Past Medical History:   Diagnosis Date    Heart disease     Hypertension     Pacemaker 11/25/2013    S/P Mechanical AVR 11/25/2013    S/P thoracic aortic aneurysm repair with  Dacron graft (2007) 11/25/2013    S/P VSD surgical patch repair -1997 11/25/2013     Past Surgical History:   Procedure Laterality Date    ASCENDING AORTIC ANEURYSM REPAIR      BREAST SURGERY      CARDIAC CATHETERIZATION      CARDIAC PACEMAKER PLACEMENT      HYSTERECTOMY      MECHANICAL AORTIC VALVE REPLACEMENT         Current Outpatient Medications:     alprazolam (XANAX) 0.25 MG tablet, Take 0.5 mg by mouth as needed. , Disp: , Rfl:     metoprolol succinate (TOPROL-XL) 100 MG 24 hr tablet, Take by mouth once daily. Pt  Take  1/2  Tab  In  Am  And  Pm , Disp: , Rfl:     potassium chloride (MICRO-K) 10 MEQ CpSR, Take 10 mEq by mouth once daily. , Disp: , Rfl:     sotalol (BETAPACE) 80 MG tablet, 80 mg 2 (two) times daily. , Disp: , Rfl:     valsartan (DIOVAN) 160 MG tablet, Take 160 mg by mouth once daily., Disp: , Rfl: 1    WARFARIN SODIUM (COUMADIN ORAL), Take 5 mg by mouth once daily. Pt is taking 5 mg daily except on Saturday and Monday pt takes 2.5mg, Disp: , Rfl:     zolpidem (AMBIEN) 10 mg tablet, Take 10 mg by mouth nightly as needed.  , Disp: , Rfl:     ALBUTEROL SULFATE (VENTOLIN HFA INHL), Inhale into the lungs as needed., Disp: , Rfl:     magnesium 200 mg Tab, Take 500 mg by mouth once daily. , Disp: , Rfl:     olopatadine (PATANASE) 0.6 % Spry, 1 spray by Nasal route once daily. , Disp: , Rfl:     Current Facility-Administered Medications:     dexamethasone injection 2 mg, 2 mg, Other, Once, Alli Simeon DPM    Vitals:    01/27/20 1113   BP: (!) 118/57   Pulse: 64     Review of Systems   Constitution: Positive for malaise/fatigue. Negative for chills, decreased appetite, diaphoresis and fever.   HENT: Negative.    Cardiovascular: Positive for dyspnea on exertion. Negative for chest pain, orthopnea, palpitations and syncope.   Respiratory: Positive for shortness of breath. Negative for sleep disturbances due to breathing, snoring, sputum production and wheezing.     Musculoskeletal: Negative.    Gastrointestinal: Negative for bloating, abdominal pain, constipation, diarrhea, nausea and vomiting.   Neurological: Negative for dizziness, headaches, light-headedness and weakness.   Psychiatric/Behavioral: Negative.         Objective:    Physical Exam   Constitutional: She is oriented to person, place, and time. She appears well-developed and well-nourished. No distress.   HENT:   Head: Normocephalic and atraumatic.   Mouth/Throat: No oropharyngeal exudate.   Eyes: Conjunctivae and EOM are normal. No scleral icterus.   Neck: Normal range of motion. Neck supple. No JVD present.   Cardiovascular: Normal rate and regular rhythm.   Murmur heard.  Pulmonary/Chest: Effort normal and breath sounds normal. No respiratory distress. She has no wheezes.   Abdominal: Soft. Bowel sounds are normal. She exhibits no distension. There is no tenderness.   Musculoskeletal: Normal range of motion. She exhibits no edema.   Neurological: She is alert and oriented to person, place, and time.   Skin: Skin is warm and dry. No rash noted. She is not diaphoretic. No erythema.   Psychiatric: She has a normal mood and affect. Her behavior is normal. Judgment normal.   Nursing note and vitals reviewed.        Assessment:       1. 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  One episode of CHF  2. 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 On Coumadin.   3. Pacemaker - no issues  4. S/P VSD surgical patch repair -1997   5. 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  Will call patient after I've had this discussion.    Addendum:  I have discussed this case with the heart team (Yara Delatorre Lucas, Tafur, and Alondra) and we believe that conservative management is still the best option for Mrs. Keyes.  The aneurysm has only enlarged 0.3cm since 2013 and is stable.  Any attempt to replug the proximal suture line would be complex and risky and her symptoms do not warrant this at this time.  Will call the patient and let her know.  F/u One year with non contrast CT chest and TTE.

## 2021-01-22 ENCOUNTER — DOCUMENTATION ONLY (OUTPATIENT)
Dept: CARDIOLOGY | Facility: CLINIC | Age: 61
End: 2021-01-22

## 2021-04-16 DIAGNOSIS — Z95.2 S/P AVR: Primary | ICD-10-CM

## 2021-04-16 DIAGNOSIS — R07.9 CHEST PAIN, UNSPECIFIED TYPE: ICD-10-CM

## 2021-06-21 ENCOUNTER — OFFICE VISIT (OUTPATIENT)
Dept: CARDIOLOGY | Facility: CLINIC | Age: 61
End: 2021-06-21
Attending: INTERNAL MEDICINE
Payer: MEDICARE

## 2021-06-21 ENCOUNTER — HOSPITAL ENCOUNTER (OUTPATIENT)
Dept: RADIOLOGY | Facility: HOSPITAL | Age: 61
Discharge: HOME | End: 2021-06-21
Attending: INTERNAL MEDICINE
Payer: MEDICARE

## 2021-06-21 ENCOUNTER — HOSPITAL ENCOUNTER (OUTPATIENT)
Dept: CARDIOLOGY | Facility: HOSPITAL | Age: 61
Discharge: HOME | End: 2021-06-21
Attending: INTERNAL MEDICINE
Payer: MEDICARE

## 2021-06-21 VITALS
SYSTOLIC BLOOD PRESSURE: 144 MMHG | HEART RATE: 73 BPM | DIASTOLIC BLOOD PRESSURE: 72 MMHG | BODY MASS INDEX: 23.27 KG/M2 | BODY MASS INDEX: 22.5 KG/M2 | DIASTOLIC BLOOD PRESSURE: 78 MMHG | HEART RATE: 68 BPM | HEIGHT: 66 IN | WEIGHT: 140 LBS | HEIGHT: 66 IN | WEIGHT: 144.81 LBS | OXYGEN SATURATION: 96 % | SYSTOLIC BLOOD PRESSURE: 152 MMHG

## 2021-06-21 DIAGNOSIS — Z95.2 S/P AVR: ICD-10-CM

## 2021-06-21 DIAGNOSIS — R07.9 CHEST PAIN, UNSPECIFIED TYPE: ICD-10-CM

## 2021-06-21 DIAGNOSIS — I71.21 ASCENDING AORTIC ANEURYSM: Primary | ICD-10-CM

## 2021-06-21 LAB
ASCENDING AORTA: 4.24 CM
AV MEAN GRADIENT: 12 MMHG
AV PEAK GRADIENT: 25 MMHG
BSA FOR ECHO PROCEDURE: 1.72 M2
CV ECHO LV RWT: 0.32 CM
DOP CALC AO PEAK VEL: 2.52 M/S
DOP CALC AO VTI: 58.71 CM
DOP CALC LVOT AREA: 3.1 CM2
DOP CALC LVOT DIAMETER: 1.99 CM
E WAVE DECELERATION TIME: 163.35 MSEC
E/A RATIO: 1.89
E/E' RATIO: 28 M/S
ECHO LV POSTERIOR WALL: 0.8 CM (ref 0.6–1.1)
EJECTION FRACTION: 55 %
FRACTIONAL SHORTENING: 29 % (ref 28–44)
INTERVENTRICULAR SEPTUM: 0.9 CM (ref 0.6–1.1)
LA MAJOR: 5.06 CM
LA MINOR: 5.09 CM
LA WIDTH: 3.15 CM
LEFT ATRIUM SIZE: 3.23 CM
LEFT ATRIUM VOLUME INDEX MOD: 25.2 ML/M2
LEFT ATRIUM VOLUME INDEX: 25.5 ML/M2
LEFT ATRIUM VOLUME MOD: 43.31 CM3
LEFT ATRIUM VOLUME: 43.89 CM3
LEFT INTERNAL DIMENSION IN SYSTOLE: 3.59 CM (ref 2.1–4)
LEFT VENTRICLE DIASTOLIC VOLUME INDEX: 69.62 ML/M2
LEFT VENTRICLE DIASTOLIC VOLUME: 119.75 ML
LEFT VENTRICLE MASS INDEX: 86 G/M2
LEFT VENTRICLE SYSTOLIC VOLUME INDEX: 31.4 ML/M2
LEFT VENTRICLE SYSTOLIC VOLUME: 54 ML
LEFT VENTRICULAR INTERNAL DIMENSION IN DIASTOLE: 5.03 CM (ref 3.5–6)
LEFT VENTRICULAR MASS: 148.33 G
LV LATERAL E/E' RATIO: 20 M/S
LV SEPTAL E/E' RATIO: 46.67 M/S
MV PEAK A VEL: 0.74 M/S
MV PEAK E VEL: 1.4 M/S
MV STENOSIS PRESSURE HALF TIME: 47.37 MS
MV VALVE AREA P 1/2 METHOD: 4.64 CM2
RA MAJOR: 5.55 CM
RA PRESSURE: 15 MMHG
RA WIDTH: 4.84 CM
RIGHT VENTRICULAR END-DIASTOLIC DIMENSION: 4.68 CM
RV TISSUE DOPPLER FREE WALL SYSTOLIC VELOCITY 1 (APICAL 4 CHAMBER VIEW): 10.11 CM/S
SINUS: 2.67 CM
STJ: 2.97 CM
TDI LATERAL: 0.07 M/S
TDI SEPTAL: 0.03 M/S
TDI: 0.05 M/S
TRICUSPID ANNULAR PLANE SYSTOLIC EXCURSION: 2.24 CM

## 2021-06-21 PROCEDURE — 93306 TTE W/DOPPLER COMPLETE: CPT

## 2021-06-21 PROCEDURE — 99999 PR PBB SHADOW E&M-EST. PATIENT-LVL III: CPT | Mod: PBBFAC,,, | Performed by: INTERNAL MEDICINE

## 2021-06-21 PROCEDURE — 99213 OFFICE O/P EST LOW 20 MIN: CPT | Mod: S$GLB,,, | Performed by: INTERNAL MEDICINE

## 2021-06-21 PROCEDURE — 3008F PR BODY MASS INDEX (BMI) DOCUMENTED: ICD-10-PCS | Mod: CPTII,S$GLB,, | Performed by: INTERNAL MEDICINE

## 2021-06-21 PROCEDURE — 99213 PR OFFICE/OUTPT VISIT, EST, LEVL III, 20-29 MIN: ICD-10-PCS | Mod: S$GLB,,, | Performed by: INTERNAL MEDICINE

## 2021-06-21 PROCEDURE — 1126F AMNT PAIN NOTED NONE PRSNT: CPT | Mod: S$GLB,,, | Performed by: INTERNAL MEDICINE

## 2021-06-21 PROCEDURE — 93306 ECHO (CUPID ONLY): ICD-10-PCS | Mod: 26,,, | Performed by: INTERNAL MEDICINE

## 2021-06-21 PROCEDURE — 1126F PR PAIN SEVERITY QUANTIFIED, NO PAIN PRESENT: ICD-10-PCS | Mod: S$GLB,,, | Performed by: INTERNAL MEDICINE

## 2021-06-21 PROCEDURE — 71250 CT THORAX DX C-: CPT | Mod: 26,,, | Performed by: RADIOLOGY

## 2021-06-21 PROCEDURE — 99999 PR PBB SHADOW E&M-EST. PATIENT-LVL III: ICD-10-PCS | Mod: PBBFAC,,, | Performed by: INTERNAL MEDICINE

## 2021-06-21 PROCEDURE — 3008F BODY MASS INDEX DOCD: CPT | Mod: CPTII,S$GLB,, | Performed by: INTERNAL MEDICINE

## 2021-06-21 PROCEDURE — 71250 CT CHEST WITHOUT CONTRAST: ICD-10-PCS | Mod: 26,,, | Performed by: RADIOLOGY

## 2021-06-21 PROCEDURE — 71250 CT THORAX DX C-: CPT | Mod: TC

## 2021-06-21 PROCEDURE — 93306 TTE W/DOPPLER COMPLETE: CPT | Mod: 26,,, | Performed by: INTERNAL MEDICINE

## 2021-06-21 RX ORDER — PREDNISONE 10 MG/1
TABLET ORAL
Status: ON HOLD | COMMUNITY
Start: 2021-06-19 | End: 2022-05-18 | Stop reason: ALTCHOICE

## 2021-06-21 RX ORDER — ESCITALOPRAM OXALATE 10 MG/1
10 TABLET ORAL DAILY
Status: ON HOLD | COMMUNITY
Start: 2021-05-10 | End: 2022-05-18

## 2021-06-21 RX ORDER — VALSARTAN 320 MG/1
160 TABLET ORAL EVERY 12 HOURS
Status: ON HOLD | COMMUNITY
End: 2023-09-14 | Stop reason: HOSPADM

## 2021-06-21 RX ORDER — LORATADINE 10 MG/1
TABLET ORAL
COMMUNITY
Start: 2021-06-19

## 2022-05-06 ENCOUNTER — DOCUMENTATION ONLY (OUTPATIENT)
Dept: CARDIOLOGY | Facility: CLINIC | Age: 62
End: 2022-05-06
Payer: MEDICARE

## 2022-05-16 ENCOUNTER — OFFICE VISIT (OUTPATIENT)
Dept: CARDIOLOGY | Facility: CLINIC | Age: 62
DRG: 289 | End: 2022-05-16
Payer: MEDICARE

## 2022-05-16 ENCOUNTER — HOSPITAL ENCOUNTER (OUTPATIENT)
Dept: CARDIOLOGY | Facility: HOSPITAL | Age: 62
Discharge: HOME | DRG: 289 | End: 2022-05-16
Attending: INTERNAL MEDICINE
Payer: MEDICARE

## 2022-05-16 ENCOUNTER — HOSPITAL ENCOUNTER (OUTPATIENT)
Dept: RADIOLOGY | Facility: HOSPITAL | Age: 62
Discharge: HOME | DRG: 289 | End: 2022-05-16
Attending: INTERNAL MEDICINE
Payer: MEDICARE

## 2022-05-16 ENCOUNTER — OFFICE VISIT (OUTPATIENT)
Dept: INFECTIOUS DISEASES | Facility: CLINIC | Age: 62
DRG: 289 | End: 2022-05-16
Payer: MEDICARE

## 2022-05-16 VITALS
HEART RATE: 67 BPM | DIASTOLIC BLOOD PRESSURE: 68 MMHG | WEIGHT: 131.81 LBS | BODY MASS INDEX: 24.26 KG/M2 | TEMPERATURE: 98 F | SYSTOLIC BLOOD PRESSURE: 117 MMHG | HEIGHT: 62 IN

## 2022-05-16 VITALS
WEIGHT: 144 LBS | BODY MASS INDEX: 23.14 KG/M2 | SYSTOLIC BLOOD PRESSURE: 116 MMHG | HEART RATE: 78 BPM | HEIGHT: 66 IN | DIASTOLIC BLOOD PRESSURE: 72 MMHG

## 2022-05-16 VITALS
HEART RATE: 69 BPM | OXYGEN SATURATION: 100 % | BODY MASS INDEX: 21.11 KG/M2 | DIASTOLIC BLOOD PRESSURE: 62 MMHG | SYSTOLIC BLOOD PRESSURE: 111 MMHG | WEIGHT: 131.38 LBS | HEIGHT: 66 IN

## 2022-05-16 DIAGNOSIS — R50.9 FEVER, UNSPECIFIED FEVER CAUSE: ICD-10-CM

## 2022-05-16 DIAGNOSIS — Z95.2 S/P AVR: ICD-10-CM

## 2022-05-16 DIAGNOSIS — I71.21 ASCENDING AORTIC ANEURYSM: ICD-10-CM

## 2022-05-16 DIAGNOSIS — R91.8 PULMONARY NODULES: ICD-10-CM

## 2022-05-16 DIAGNOSIS — Z95.0 PACEMAKER: Chronic | ICD-10-CM

## 2022-05-16 DIAGNOSIS — R50.9 FEVER, UNSPECIFIED FEVER CAUSE: Primary | ICD-10-CM

## 2022-05-16 DIAGNOSIS — Z98.890 S/P THORACIC AORTIC ANEURYSM REPAIR: Chronic | ICD-10-CM

## 2022-05-16 DIAGNOSIS — Z86.79 S/P THORACIC AORTIC ANEURYSM REPAIR: Chronic | ICD-10-CM

## 2022-05-16 DIAGNOSIS — R61 NIGHT SWEATS: Primary | ICD-10-CM

## 2022-05-16 DIAGNOSIS — Z95.0 PACEMAKER: ICD-10-CM

## 2022-05-16 LAB
ASCENDING AORTA: 4.1 CM
AV INDEX (PROSTH): 0.33
AV MEAN GRADIENT: 21 MMHG
AV PEAK GRADIENT: 46 MMHG
AV VALVE AREA: 1.38 CM2
AV VELOCITY RATIO: 0.28
BSA FOR ECHO PROCEDURE: 1.74 M2
CV ECHO LV RWT: 0.35 CM
DOP CALC AO PEAK VEL: 3.38 M/S
DOP CALC AO VTI: 69.22 CM
DOP CALC LVOT AREA: 4.2 CM2
DOP CALC LVOT DIAMETER: 2.3 CM
DOP CALC LVOT PEAK VEL: 0.96 M/S
DOP CALC LVOT STROKE VOLUME: 95.43 CM3
DOP CALCLVOT PEAK VEL VTI: 22.98 CM
E WAVE DECELERATION TIME: 240.36 MSEC
E/A RATIO: 1.15
E/E' RATIO: 13.53 M/S
ECHO LV POSTERIOR WALL: 0.9 CM (ref 0.6–1.1)
EJECTION FRACTION: 60 %
FRACTIONAL SHORTENING: 27 % (ref 28–44)
INTERVENTRICULAR SEPTUM: 1 CM (ref 0.6–1.1)
IVRT: 88.49 MSEC
LA MAJOR: 5.27 CM
LA MINOR: 5.32 CM
LA WIDTH: 3.52 CM
LEFT ATRIUM SIZE: 3.22 CM
LEFT ATRIUM VOLUME INDEX MOD: 23.8 ML/M2
LEFT ATRIUM VOLUME INDEX: 29.3 ML/M2
LEFT ATRIUM VOLUME MOD: 41.48 CM3
LEFT ATRIUM VOLUME: 51.01 CM3
LEFT INTERNAL DIMENSION IN SYSTOLE: 3.7 CM (ref 2.1–4)
LEFT VENTRICLE DIASTOLIC VOLUME INDEX: 66.21 ML/M2
LEFT VENTRICLE DIASTOLIC VOLUME: 115.2 ML
LEFT VENTRICLE MASS INDEX: 101 G/M2
LEFT VENTRICLE SYSTOLIC VOLUME INDEX: 30.1 ML/M2
LEFT VENTRICLE SYSTOLIC VOLUME: 52.4 ML
LEFT VENTRICULAR INTERNAL DIMENSION IN DIASTOLE: 5.1 CM (ref 3.5–6)
LEFT VENTRICULAR MASS: 175.61 G
LV LATERAL E/E' RATIO: 10.45 M/S
LV SEPTAL E/E' RATIO: 19.17 M/S
MV PEAK A VEL: 1 M/S
MV PEAK E VEL: 1.15 M/S
MV STENOSIS PRESSURE HALF TIME: 69.7 MS
MV VALVE AREA P 1/2 METHOD: 3.16 CM2
PISA MRMAX VEL: 0.03 M/S
PISA TR MAX VEL: 2.61 M/S
RA MAJOR: 5.65 CM
RA PRESSURE: 8 MMHG
RA WIDTH: 3.83 CM
RIGHT VENTRICULAR END-DIASTOLIC DIMENSION: 3.65 CM
RV TISSUE DOPPLER FREE WALL SYSTOLIC VELOCITY 1 (APICAL 4 CHAMBER VIEW): 10.69 CM/S
SINUS: 3.7 CM
STJ: 2.94 CM
TDI LATERAL: 0.11 M/S
TDI SEPTAL: 0.06 M/S
TDI: 0.09 M/S
TR MAX PG: 27 MMHG
TRICUSPID ANNULAR PLANE SYSTOLIC EXCURSION: 2.04 CM
TV REST PULMONARY ARTERY PRESSURE: 35 MMHG

## 2022-05-16 PROCEDURE — 3008F BODY MASS INDEX DOCD: CPT | Mod: CPTII,S$GLB,, | Performed by: INTERNAL MEDICINE

## 2022-05-16 PROCEDURE — 1159F MED LIST DOCD IN RCRD: CPT | Mod: CPTII,S$GLB,, | Performed by: INTERNAL MEDICINE

## 2022-05-16 PROCEDURE — 71250 CT THORAX DX C-: CPT | Mod: 26,,, | Performed by: RADIOLOGY

## 2022-05-16 PROCEDURE — 3078F DIAST BP <80 MM HG: CPT | Mod: CPTII,S$GLB,, | Performed by: INTERNAL MEDICINE

## 2022-05-16 PROCEDURE — 3074F SYST BP LT 130 MM HG: CPT | Mod: CPTII,S$GLB,, | Performed by: INTERNAL MEDICINE

## 2022-05-16 PROCEDURE — 99999 PR PBB SHADOW E&M-EST. PATIENT-LVL IV: CPT | Mod: PBBFAC,,, | Performed by: INTERNAL MEDICINE

## 2022-05-16 PROCEDURE — 3008F PR BODY MASS INDEX (BMI) DOCUMENTED: ICD-10-PCS | Mod: CPTII,S$GLB,, | Performed by: INTERNAL MEDICINE

## 2022-05-16 PROCEDURE — 1159F PR MEDICATION LIST DOCUMENTED IN MEDICAL RECORD: ICD-10-PCS | Mod: CPTII,S$GLB,, | Performed by: INTERNAL MEDICINE

## 2022-05-16 PROCEDURE — 93306 TTE W/DOPPLER COMPLETE: CPT | Mod: 26,,, | Performed by: INTERNAL MEDICINE

## 2022-05-16 PROCEDURE — 3078F PR MOST RECENT DIASTOLIC BLOOD PRESSURE < 80 MM HG: ICD-10-PCS | Mod: CPTII,S$GLB,, | Performed by: INTERNAL MEDICINE

## 2022-05-16 PROCEDURE — 99214 OFFICE O/P EST MOD 30 MIN: CPT | Mod: S$GLB,,, | Performed by: INTERNAL MEDICINE

## 2022-05-16 PROCEDURE — 99999 PR PBB SHADOW E&M-EST. PATIENT-LVL IV: ICD-10-PCS | Mod: PBBFAC,,, | Performed by: INTERNAL MEDICINE

## 2022-05-16 PROCEDURE — 99204 PR OFFICE/OUTPT VISIT, NEW, LEVL IV, 45-59 MIN: ICD-10-PCS | Mod: S$GLB,,, | Performed by: INTERNAL MEDICINE

## 2022-05-16 PROCEDURE — 93306 ECHO (CUPID ONLY): ICD-10-PCS | Mod: 26,,, | Performed by: INTERNAL MEDICINE

## 2022-05-16 PROCEDURE — 71250 CT THORAX DX C-: CPT | Mod: TC

## 2022-05-16 PROCEDURE — 3074F PR MOST RECENT SYSTOLIC BLOOD PRESSURE < 130 MM HG: ICD-10-PCS | Mod: CPTII,S$GLB,, | Performed by: INTERNAL MEDICINE

## 2022-05-16 PROCEDURE — 71250 CT CHEST WITHOUT CONTRAST: ICD-10-PCS | Mod: 26,,, | Performed by: RADIOLOGY

## 2022-05-16 PROCEDURE — 99214 PR OFFICE/OUTPT VISIT, EST, LEVL IV, 30-39 MIN: ICD-10-PCS | Mod: S$GLB,,, | Performed by: INTERNAL MEDICINE

## 2022-05-16 PROCEDURE — 93306 TTE W/DOPPLER COMPLETE: CPT

## 2022-05-16 PROCEDURE — 99204 OFFICE O/P NEW MOD 45 MIN: CPT | Mod: S$GLB,,, | Performed by: INTERNAL MEDICINE

## 2022-05-16 RX ORDER — METOPROLOL TARTRATE 50 MG/1
50 TABLET ORAL 2 TIMES DAILY
Status: ON HOLD | COMMUNITY
End: 2022-05-18

## 2022-05-16 RX ORDER — OFLOXACIN 3 MG/ML
SOLUTION/ DROPS OPHTHALMIC
Status: ON HOLD | COMMUNITY
Start: 2022-05-06 | End: 2022-05-18 | Stop reason: ALTCHOICE

## 2022-05-16 RX ORDER — PREDNISOLONE ACETATE 10 MG/ML
1 SUSPENSION/ DROPS OPHTHALMIC 4 TIMES DAILY
COMMUNITY
Start: 2022-05-06

## 2022-05-16 NOTE — ASSESSMENT & PLAN NOTE
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

## 2022-05-16 NOTE — PROGRESS NOTES
Subjective:    Patient ID:  Naina Keyes is a 61 y.o. female who presents for follow-up of Ascending aortic aneurysm repair and mechanical AVR.    REF:  Kenny Newsome     HPI 62 y/o lady with hx of AVR with a mechanical valve (1997) and aortic tube graft for endocarditis in 2007.  Post operatively she had a proximal anastomosis leak treated with a 10mm  Amplatzer Septal Occluder in 2009. She also has a stable ascending aortic aneurysm at 4 cm. Both Codie and Juan Ramon think she is surgically inoperable because her aorta is stuck against the sternum.     She comes to clinic today for one year follow up. She reports worsening fatigue and SOB over the past 2 months. She also reports fevers for the past 2 weeks ranging from 101-103 deg farenheit. She has also had night sweats for about 2 weeks.       Past Medical History:   Diagnosis Date    Heart disease     Hypertension     Pacemaker 11/25/2013    S/P Mechanical AVR 11/25/2013    S/P thoracic aortic aneurysm repair with Dacron graft (2007) 11/25/2013    S/P VSD surgical patch repair -1997 11/25/2013     Past Surgical History:   Procedure Laterality Date    ASCENDING AORTIC ANEURYSM REPAIR      BREAST SURGERY      CARDIAC CATHETERIZATION      CARDIAC PACEMAKER PLACEMENT      HYSTERECTOMY      MECHANICAL AORTIC VALVE REPLACEMENT         Current Outpatient Medications:     ALBUTEROL SULFATE (VENTOLIN HFA INHL), Inhale into the lungs as needed., Disp: , Rfl:     alprazolam (XANAX) 0.25 MG tablet, Take 0.5 mg by mouth as needed., Disp: , Rfl:     EScitalopram oxalate (LEXAPRO) 10 MG tablet, Take 10 mg by mouth once daily., Disp: , Rfl:     loratadine (CLARITIN) 10 mg tablet, TAKE ONE TABLET BY MOUTH DAILY AS NEEDED FOR ALLERGIES, Disp: , Rfl:     magnesium 200 mg Tab, Take 500 mg by mouth once daily. , Disp: , Rfl:     metoprolol succinate (TOPROL-XL) 100 MG 24 hr tablet, Take 50 mg by mouth 2 (two) times daily. Pt  Take  1/2  Tab  In  Am  And 100mg  Pm,  Disp: , Rfl:     olopatadine (PATANASE) 0.6 % nasal spray, 1 spray by Nasal route once daily., Disp: , Rfl:     potassium chloride (MICRO-K) 10 MEQ CpSR, Take 10 mEq by mouth 2 (two) times daily. , Disp: , Rfl:     prednisoLONE acetate (PRED FORTE) 1 % DrpS, Place 1 drop into the right eye 4 (four) times daily., Disp: , Rfl:     sotalol (BETAPACE) 80 MG tablet, 80 mg 2 (two) times daily. , Disp: , Rfl:     valsartan (DIOVAN) 160 MG tablet, Take 160 mg by mouth once daily., Disp: , Rfl: 1    valsartan (DIOVAN) 320 MG tablet, Take 320 mg by mouth every 12 (twelve) hours. 1/2 in the morning 1/2 at night, Disp: , Rfl:     WARFARIN SODIUM (COUMADIN ORAL), Take 5 mg by mouth once daily. Pt is taking 5 mg daily except on Saturday and Monday pt takes 2.5mg, Disp: , Rfl:     zolpidem (AMBIEN) 10 mg Tab, Take 10 mg by mouth nightly as needed., Disp: , Rfl:     ofloxacin (OCUFLOX) 0.3 % ophthalmic solution, INSTILL ONE DROP IN THE RIGHT EYE FOUR TIMES DAILY, Disp: , Rfl:     predniSONE (DELTASONE) 10 MG tablet, , Disp: , Rfl:     Current Facility-Administered Medications:     dexamethasone injection 2 mg, 2 mg, Other, Once, Alli Simeon DPM    Vitals:    05/16/22 1116   BP: 111/62   Pulse: 69     Review of Systems   Constitution: Positive for malaise/fatigue. Negative for chills, decreased appetite, diaphoresis and fever.   HENT: Negative.    Cardiovascular: Positive for dyspnea on exertion. Negative for chest pain, orthopnea, palpitations and syncope.   Respiratory: Positive for shortness of breath. Negative for sleep disturbances due to breathing, snoring, sputum production and wheezing.    Musculoskeletal: Negative.    Gastrointestinal: Negative for bloating, abdominal pain, constipation, diarrhea, nausea and vomiting.   Neurological: Negative for dizziness, headaches, light-headedness and weakness.   Psychiatric/Behavioral: Negative.         Objective:    Physical Exam   Constitutional: She is oriented to  person, place, and time. She appears well-developed and well-nourished. No distress.   HENT:   Head: Normocephalic and atraumatic.   Mouth/Throat: No oropharyngeal exudate.   Eyes: Conjunctivae and EOM are normal. No scleral icterus.   Neck: Normal range of motion. Neck supple. No JVD present.   Cardiovascular: Normal rate and regular rhythm.   Murmur heard.  Pulmonary/Chest: Effort normal and breath sounds normal. No respiratory distress. She has no wheezes.   Abdominal: Soft. Bowel sounds are normal. She exhibits no distension. There is no tenderness.   Musculoskeletal: Normal range of motion. She exhibits no edema.   Neurological: She is alert and oriented to person, place, and time.   Skin: Skin is warm and dry. No rash noted. She is not diaphoretic. No erythema.   Psychiatric: She has a normal mood and affect. Her behavior is normal. Judgment normal.   Nursing note and vitals reviewed.    Hemolysis labs 2020: LDH and retic count normal, haptoglobin low.    CT 6/21/21  Impression:  1. No acute cardiopulmonary disease or pleural effusion identified.  2. Stable findings status post repair of dissection in the ascending thoracic aorta, including aortic valve replacement, detailed above.  Mildly dilated ascending aorta is stable at 4.2 cm.  3. Interval development nonspecific 0.7 cm nodule in the right upper lobe.  For a solid nodule 6-8 mm, Fleischner Society 2017 guidelines recommend follow up with non-contrast chest CT at 6-12 months and 18-24 months after discovery.  4. Bilateral breast implants with apparent leakage/rupture now seen along medial aspect of the right implant.  5. Cholecystectomy and other findings as above.  6.  This report was flagged in Epic as abnormal.      CT 5/16/22  Impression:  1.  Stable postsurgical changes.  Ascending aorta aneurysmal up to 4.0 cm.  Findings likely stable allowing for differences in technique and for inter-and intra-observer variability.  2.  There are 2 new pulmonary  nodules, 1 in either lung.  For multiple solid nodules all <6 mm, Fleischner Society 2017 guidelines recommend no routine follow up for a low risk patient, or follow up with non-contrast chest CT at 12 months after discovery in a high risk patient.  3.  Other stable findings as described above.    TTE 5/16/22:   · The left ventricle is normal in size with mild eccentric hypertrophy and normal systolic function.  · Normal left ventricular diastolic function.  · Normal right ventricular size with normal right ventricular systolic function.  · There is a mechanical aortic valve present. There is trivial central aortic insufficiency present. Aortic valve area is 1.38 cm2; peak velocity is 3.38 m/s; mean gradient is 21 mmHg. The dimensionless index is 0.33.  · Aortic valve area is 1.38 cm2; peak velocity is 3.38 m/s; mean gradient is 21 mmHg. The dimensionless index is 0.33.  · A vascular plug is noted in the ascending aorta in a posterolateral location.  · The estimated ejection fraction is 60%.  · The ascending aorta is mildly dilated measuring 4.1 cm.  · The aortic root at the sinuses of Valsalva is mildly dilated measuring 3.7 cm  · Intermediate central venous pressure (8 mmHg).  · The estimated PA systolic pressure is 35 mmHg.          Assessment:       Fever  Fevers 101-103 degrees occurring almost every night for the past 2 weeks. Patient has a history of endocarditis and is s/p AVR.     S/P thoracic aortic aneurysm repair with Dacron graft (2007)  S/P thoracic aortic aneurysm repair with Dacron graft (2007) with subsequent left post suture line aortic leak and peter  fistula communicating to right atrium s/p 10mm AGA septal occluder placed with partial closure of leak in June 2009 .   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    S/P Mechanical AVR secondary to bacterial endocarditis -1997, [facility] valve  S/P Mechanical AVR - secondary to bacterial endocarditis in 1997. On Coumadin.      Pacemaker  No issues.     Ascending aortic aneurysm - 4 cm, stable  Stable at 4 cm.          Plan:       1. Continue conservative management. Patient is not a surgical candidate.   2. Ambulatory consult to ID for fevers/night sweats.   3. Annual follow up with CT non contrast for aneurysm.         Staff:  I have personally taken the history and examined this patient and agree with the fellow's note as stated above and amended it accordingly :-)  I have followed this lady for years.  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  Concerning now is 2 months of fevers which have not been evaluated.  She hasn't even had a WBC.  Will send her to lab for CBC, CMP, ESR, CRP, and blood cultures and then ID clinic at 130PM.

## 2022-05-16 NOTE — ASSESSMENT & PLAN NOTE
Fevers 101-103 degrees occurring almost every night for the past 2 weeks. Patient has a history of endocarditis and is s/p AVR.

## 2022-05-17 ENCOUNTER — DOCUMENTATION ONLY (OUTPATIENT)
Dept: CARDIAC CATH/INVASIVE PROCEDURES | Facility: HOSPITAL | Age: 62
End: 2022-05-17
Payer: MEDICARE

## 2022-05-17 ENCOUNTER — TELEPHONE (OUTPATIENT)
Dept: INFECTIOUS DISEASES | Facility: CLINIC | Age: 62
End: 2022-05-17
Payer: MEDICARE

## 2022-05-17 NOTE — PROGRESS NOTES
Blood cultures performed by Interventional Cardiology positive for GPC in chains. Patient advised to go to local ED for further management. Call received from Dr. Pang in [facility] ED. Blood cultures have been repeated. Advised to start vancomycin. They do not have Cardiology or Infectious Diseases for consultation. Advised that patient should be transferred for those services.

## 2022-05-17 NOTE — TELEPHONE ENCOUNTER
"Called patient and informed per blood cultures results being positive that provider recommending go to ED for further evaluation. Patient stated she will "talk to her nurse in town and get iv antibiotics". Informed provider is recommending she go to ED and she stated she is not going because she has a nurse or two in her family and there is also a nurse in her town and she will get iv abx. Informed her will document and inform provider of refusal.  "

## 2022-05-17 NOTE — PROGRESS NOTES
Patient's blood cultures a positive. Results were shared with Dr Gutierrez. Patient was instructed to go to the ED in [place] for admission and initiation of IV antibiotics.

## 2022-05-18 ENCOUNTER — HOSPITAL ENCOUNTER (INPATIENT)
Facility: HOSPITAL | Age: 62
LOS: 5 days | Discharge: HOME HEALTH | DRG: 289 | End: 2022-05-23
Attending: HOSPITALIST | Admitting: HOSPITALIST
Payer: MEDICARE

## 2022-05-18 DIAGNOSIS — R78.81 POSITIVE BLOOD CULTURE: ICD-10-CM

## 2022-05-18 DIAGNOSIS — I44.2 CHB (COMPLETE HEART BLOCK): ICD-10-CM

## 2022-05-18 DIAGNOSIS — R94.31 PROLONGED QT INTERVAL: ICD-10-CM

## 2022-05-18 DIAGNOSIS — R78.81 STREPTOCOCCAL BACTEREMIA: Primary | ICD-10-CM

## 2022-05-18 DIAGNOSIS — R78.81 BACTEREMIA: ICD-10-CM

## 2022-05-18 DIAGNOSIS — B95.5 STREPTOCOCCAL BACTEREMIA: Primary | ICD-10-CM

## 2022-05-18 DIAGNOSIS — Z95.2 H/O MECHANICAL AORTIC VALVE REPLACEMENT: ICD-10-CM

## 2022-05-18 DIAGNOSIS — R07.9 CHEST PAIN: ICD-10-CM

## 2022-05-18 PROBLEM — Z79.01 WARFARIN ANTICOAGULATION: Chronic | Status: ACTIVE | Noted: 2022-05-18

## 2022-05-18 PROBLEM — M17.12 PRIMARY LOCALIZED OSTEOARTHROSIS OF LEFT LOWER LEG: Status: ACTIVE | Noted: 2021-05-26

## 2022-05-18 PROBLEM — H25.9 AGE-RELATED CATARACT OF BOTH EYES: Status: ACTIVE | Noted: 2022-05-18

## 2022-05-18 LAB — CK SERPL-CCNC: 77 U/L (ref 20–180)

## 2022-05-18 PROCEDURE — 99223 PR INITIAL HOSPITAL CARE,LEVL III: ICD-10-PCS | Mod: AI,,, | Performed by: HOSPITALIST

## 2022-05-18 PROCEDURE — 25000003 PHARM REV CODE 250: Performed by: HOSPITALIST

## 2022-05-18 PROCEDURE — 36415 COLL VENOUS BLD VENIPUNCTURE: CPT | Performed by: HOSPITALIST

## 2022-05-18 PROCEDURE — 63600175 PHARM REV CODE 636 W HCPCS: Performed by: HOSPITALIST

## 2022-05-18 PROCEDURE — 99223 1ST HOSP IP/OBS HIGH 75: CPT | Mod: AI,,, | Performed by: HOSPITALIST

## 2022-05-18 PROCEDURE — 82550 ASSAY OF CK (CPK): CPT | Performed by: HOSPITALIST

## 2022-05-18 PROCEDURE — 87040 BLOOD CULTURE FOR BACTERIA: CPT | Performed by: HOSPITALIST

## 2022-05-18 PROCEDURE — 20600001 HC STEP DOWN PRIVATE ROOM

## 2022-05-18 PROCEDURE — 83605 ASSAY OF LACTIC ACID: CPT | Performed by: HOSPITALIST

## 2022-05-18 PROCEDURE — 84145 PROCALCITONIN (PCT): CPT | Performed by: HOSPITALIST

## 2022-05-18 RX ORDER — ZOLPIDEM TARTRATE 5 MG/1
10 TABLET ORAL NIGHTLY
Status: DISCONTINUED | OUTPATIENT
Start: 2022-05-18 | End: 2022-05-19

## 2022-05-18 RX ORDER — ALPRAZOLAM 0.25 MG/1
0.25 TABLET ORAL 2 TIMES DAILY PRN
Status: DISCONTINUED | OUTPATIENT
Start: 2022-05-18 | End: 2022-05-23 | Stop reason: HOSPADM

## 2022-05-18 RX ORDER — AMOXICILLIN 250 MG
1 CAPSULE ORAL 2 TIMES DAILY
Status: DISCONTINUED | OUTPATIENT
Start: 2022-05-18 | End: 2022-05-18

## 2022-05-18 RX ORDER — CETIRIZINE HYDROCHLORIDE 10 MG/1
10 TABLET ORAL DAILY PRN
Status: DISCONTINUED | OUTPATIENT
Start: 2022-05-18 | End: 2022-05-23 | Stop reason: HOSPADM

## 2022-05-18 RX ORDER — NALOXONE HCL 0.4 MG/ML
0.02 VIAL (ML) INJECTION
Status: DISCONTINUED | OUTPATIENT
Start: 2022-05-18 | End: 2022-05-23 | Stop reason: HOSPADM

## 2022-05-18 RX ORDER — TALC
6 POWDER (GRAM) TOPICAL NIGHTLY PRN
Status: DISCONTINUED | OUTPATIENT
Start: 2022-05-18 | End: 2022-05-23 | Stop reason: HOSPADM

## 2022-05-18 RX ORDER — METOPROLOL SUCCINATE 100 MG/1
100 TABLET, EXTENDED RELEASE ORAL NIGHTLY
Status: DISCONTINUED | OUTPATIENT
Start: 2022-05-18 | End: 2022-05-23 | Stop reason: HOSPADM

## 2022-05-18 RX ORDER — ACETAMINOPHEN 325 MG/1
650 TABLET ORAL EVERY 4 HOURS PRN
Status: DISCONTINUED | OUTPATIENT
Start: 2022-05-18 | End: 2022-05-23 | Stop reason: HOSPADM

## 2022-05-18 RX ORDER — AMOXICILLIN 250 MG
1 CAPSULE ORAL 2 TIMES DAILY PRN
Status: DISCONTINUED | OUTPATIENT
Start: 2022-05-18 | End: 2022-05-23 | Stop reason: HOSPADM

## 2022-05-18 RX ORDER — POLYETHYLENE GLYCOL 3350 17 G/17G
17 POWDER, FOR SOLUTION ORAL DAILY
Status: DISCONTINUED | OUTPATIENT
Start: 2022-05-19 | End: 2022-05-23 | Stop reason: HOSPADM

## 2022-05-18 RX ORDER — SOTALOL HYDROCHLORIDE 80 MG/1
80 TABLET ORAL 2 TIMES DAILY
Status: DISCONTINUED | OUTPATIENT
Start: 2022-05-18 | End: 2022-05-21

## 2022-05-18 RX ORDER — ENOXAPARIN SODIUM 100 MG/ML
60 INJECTION SUBCUTANEOUS
Status: DISCONTINUED | OUTPATIENT
Start: 2022-05-18 | End: 2022-05-20

## 2022-05-18 RX ORDER — SODIUM CHLORIDE 0.9 % (FLUSH) 0.9 %
10 SYRINGE (ML) INJECTION EVERY 6 HOURS PRN
Status: DISCONTINUED | OUTPATIENT
Start: 2022-05-18 | End: 2022-05-23 | Stop reason: HOSPADM

## 2022-05-18 RX ORDER — METOPROLOL SUCCINATE 50 MG/1
50 TABLET, EXTENDED RELEASE ORAL DAILY
Status: DISCONTINUED | OUTPATIENT
Start: 2022-05-19 | End: 2022-05-23 | Stop reason: HOSPADM

## 2022-05-18 RX ORDER — PREDNISOLONE ACETATE 10 MG/ML
1 SUSPENSION/ DROPS OPHTHALMIC 4 TIMES DAILY
Status: DISCONTINUED | OUTPATIENT
Start: 2022-05-19 | End: 2022-05-23 | Stop reason: HOSPADM

## 2022-05-18 RX ADMIN — METOPROLOL SUCCINATE 100 MG: 100 TABLET, EXTENDED RELEASE ORAL at 11:05

## 2022-05-18 RX ADMIN — ENOXAPARIN SODIUM 60 MG: 100 INJECTION SUBCUTANEOUS at 11:05

## 2022-05-18 RX ADMIN — ZOLPIDEM TARTRATE 10 MG: 5 TABLET ORAL at 11:05

## 2022-05-18 RX ADMIN — SOTALOL HYDROCHLORIDE 80 MG: 80 TABLET ORAL at 11:05

## 2022-05-18 RX ADMIN — VANCOMYCIN HYDROCHLORIDE 750 MG: 750 INJECTION, POWDER, LYOPHILIZED, FOR SOLUTION INTRAVENOUS at 11:05

## 2022-05-18 NOTE — PROVIDER TRANSFER
(Physician in Lead of Transfers)   Outside Transfer Acceptance Note / Regional Referral Center    Referring facility: [facility]   Referring provider: GENE MOON  Accepting facility: [facility]  Reason for transfer: Higher Level of Care Higher level of care  Transfer diagnosis: positive blood culture  Transfer specialty requested: Infectious Diseases  Transfer specialty notified: Yes  Transfer level: 2  Isolation status: No active isolations   Admission class or status: IP- Inpatient      Narrative     Patient is a 61 y.o. female who has a past medical history of Hypertension, Pacemaker, S/P Mechanical AVR, S/P thoracic aortic aneurysm repair with Dacron graft (2007), and S/P VSD surgical patch repair 1997 presented with to [facility] ED for abnormal lab results. Patient had one year follow up with interventional cardiology yesterday where she complained of shortness of breath and fevers. She had labs drawn including blood cultures. Today blood cultures returned positive for GPC in chains. Patient advised to go to local ED for further management. On arrival to ED work up unrevealing. Blood cultures have been repeated. She was started on IV Vancomycin. Vitals stable. Facility seeking transfer for ID and cardiology evaluation. Stable for transfer.      Objective     Vitals:      Recent Labs: see EPIC  CBC:  No results for input(s): WBC, HGB, HCT, PLT in the last 24 hours.  CMP:  No results for input(s): GLUCOSE, CALCIUM, ALBUMIN, PROT, NA, K, CO2, CL, BUN, CREATININE, ALKPHOS, ALT, AST, BILITOT in the last 24 hours.  No results for input(s): LACTATE in the last 72 hours.  No results for input(s): CPK, CPKMB, TROPONINI, MB in the last 168 hours.  BNP  No results for input(s): BNP, BNPTRIAGEBLO in the last 168 hours.  ABG  No results for input(s): PH, PO2, PCO2, HCO3, BE in the last 168 hours.   Lab Results   Component Value Date    INR 2.9 (H)  07/08/2009    INR 1.7 (H) 06/06/2009    INR 1.7 (H) 06/05/2009       Recent imaging:   Echo  · The left ventricle is normal in size with mild eccentric hypertrophy and   normal systolic function.  · Normal left ventricular diastolic function.  · Normal right ventricular size with normal right ventricular systolic   function.  · There is a mechanical aortic valve present. There is trivial central   aortic insufficiency present. Aortic valve area is 1.38 cm2; peak velocity   is 3.38 m/s; mean gradient is 21 mmHg. The dimensionless index is 0.33.  · Aortic valve area is 1.38 cm2; peak velocity is 3.38 m/s; mean gradient   is 21 mmHg. The dimensionless index is 0.33.  · A vascular plug is noted in the ascending aorta in a posterolateral   location.  · The estimated ejection fraction is 60%.  · The ascending aorta is mildly dilated measuring 4.1 cm.  · The aortic root at the sinuses of Valsalva is mildly dilated measuring   3.7 cm  · Intermediate central venous pressure (8 mmHg).  · The estimated PA systolic pressure is 35 mmHg.     CT Chest Without Contrast  Narrative: EXAMINATION:  CT CHEST WITHOUT CONTRAST    CLINICAL HISTORY:  Aortic aneurysm, known or suspected; Presence of prosthetic heart valve    TECHNIQUE:  Low dose axial images, sagittal and coronal reformations were obtained from the thoracic inlet to the lung bases. Contrast was not administered.    COMPARISON:  06/21/2021    FINDINGS:  No intravenous contrast was administered for this examination.  Therefore, it may have diminished sensitivity for detection of certain abnormalities.    Thyroid gland: The a CT CT to try the    Trachea: Within normal limits.    Esophagus: Not well distended for full evaluation, but grossly unremarkable.    Cardiovascular: Heart is top limits of normal size to mildly enlarged.    Status post aortic valve replacement.    Aneurysmal dissection of the ascending aorta, up to 4.0 cm.    There is a vascular plug related to the left  posteromedial aorta, just above a aortic valve prosthesis..No pericardial effusion.Appearance is stable compared to prior exam.    Lymph nodes: None abnormally enlarged.    Lungs/pleura/airways:    Stable mild biapical scarring.    Newly seen left upper lobe 4 mm nodule 4-225) and right apical 4 mm nodule (4-88).    Stable 5 mm right lower lobe ground-glass nodule (4-402).    Upper abdomen: Status post cholecystectomy.  A 1.7 cm right adrenal nodule demonstrates attenuation less than 0 Hounsfield units.    Bones: Unremarkable for stated age.    Other: Status post midline sternotomy.  Dual chamber ICD with pacer imbedded in the left chest wall.    Status post bilateral breast augmentation.  There is a right intracapsular implant rupture.  Impression: 1.  Stable postsurgical changes.  Ascending aorta aneurysmal up to 4.0 cm.  Findings likely stable allowing for differences in technique and for inter-and intra-observer variability.    2.  There are 2 new pulmonary nodules, 1 in either lung.  For multiple solid nodules all <6 mm, Fleischner Society 2017 guidelines recommend no routine follow up for a low risk patient, or follow up with non-contrast chest CT at 12 months after discovery in a high risk patient.    3.  Other stable findings as described above.    Electronically signed by: Maria T Jung  Date:    05/16/2022  Time:    09:39      Airway:     Vent settings:     IV access:    Allergies:   Review of patient's allergies indicates:   Allergen Reactions    Iodine Anaphylaxis     contrast    Lidocaine Anaphylaxis     cetacaine spray      NPO: No      Anticoagulation:   Anticoagulants     None           Instructions      Ezequiel Jack-  Admit to Hospital Medicine  Upon patient arrival to floor, please send SecureChat to [facility] or call extension [phone] (if no answer, this will flip to a beeper, so enter your call back number) for Hospital Medicine admit team assignment and for additional admit orders for the patient.   Do not page the attending physician associated with the patient on arrival (this physician may not be on duty at the time of arrival).  Rather, always call [phone] to reach the triage physician for orders and team assignment.

## 2022-05-19 ENCOUNTER — DOCUMENTATION ONLY (OUTPATIENT)
Dept: CARDIOLOGY | Facility: HOSPITAL | Age: 62
End: 2022-05-19
Payer: MEDICARE

## 2022-05-19 LAB
ANION GAP SERPL CALC-SCNC: 7 MMOL/L (ref 8–16)
ANION GAP SERPL CALC-SCNC: 9 MMOL/L (ref 8–16)
APTT BLDCRRT: 44.3 SEC (ref 21–32)
BASOPHILS # BLD AUTO: 0.06 K/UL (ref 0–0.2)
BASOPHILS # BLD AUTO: 0.06 K/UL (ref 0–0.2)
BASOPHILS NFR BLD: 1.1 % (ref 0–1.9)
BASOPHILS NFR BLD: 1.1 % (ref 0–1.9)
BNP SERPL-MCNC: 607 PG/ML (ref 0–99)
BUN SERPL-MCNC: 11 MG/DL (ref 8–23)
BUN SERPL-MCNC: 11 MG/DL (ref 8–23)
CALCIUM SERPL-MCNC: 8.8 MG/DL (ref 8.7–10.5)
CALCIUM SERPL-MCNC: 9.1 MG/DL (ref 8.7–10.5)
CHLORIDE SERPL-SCNC: 108 MMOL/L (ref 95–110)
CHLORIDE SERPL-SCNC: 109 MMOL/L (ref 95–110)
CO2 SERPL-SCNC: 23 MMOL/L (ref 23–29)
CO2 SERPL-SCNC: 24 MMOL/L (ref 23–29)
CREAT SERPL-MCNC: 0.7 MG/DL (ref 0.5–1.4)
CREAT SERPL-MCNC: 0.8 MG/DL (ref 0.5–1.4)
DIFFERENTIAL METHOD: ABNORMAL
DIFFERENTIAL METHOD: ABNORMAL
EOSINOPHIL # BLD AUTO: 0.2 K/UL (ref 0–0.5)
EOSINOPHIL # BLD AUTO: 0.3 K/UL (ref 0–0.5)
EOSINOPHIL NFR BLD: 4 % (ref 0–8)
EOSINOPHIL NFR BLD: 4.7 % (ref 0–8)
ERYTHROCYTE [DISTWIDTH] IN BLOOD BY AUTOMATED COUNT: 13.2 % (ref 11.5–14.5)
ERYTHROCYTE [DISTWIDTH] IN BLOOD BY AUTOMATED COUNT: 13.3 % (ref 11.5–14.5)
EST. GFR  (AFRICAN AMERICAN): >60 ML/MIN/1.73 M^2
EST. GFR  (AFRICAN AMERICAN): >60 ML/MIN/1.73 M^2
EST. GFR  (NON AFRICAN AMERICAN): >60 ML/MIN/1.73 M^2
EST. GFR  (NON AFRICAN AMERICAN): >60 ML/MIN/1.73 M^2
GLUCOSE SERPL-MCNC: 70 MG/DL (ref 70–110)
GLUCOSE SERPL-MCNC: 91 MG/DL (ref 70–110)
HCT VFR BLD AUTO: 35.2 % (ref 37–48.5)
HCT VFR BLD AUTO: 37.9 % (ref 37–48.5)
HGB BLD-MCNC: 11.2 G/DL (ref 12–16)
HGB BLD-MCNC: 12.1 G/DL (ref 12–16)
IMM GRANULOCYTES # BLD AUTO: 0.02 K/UL (ref 0–0.04)
IMM GRANULOCYTES # BLD AUTO: 0.03 K/UL (ref 0–0.04)
IMM GRANULOCYTES NFR BLD AUTO: 0.4 % (ref 0–0.5)
IMM GRANULOCYTES NFR BLD AUTO: 0.5 % (ref 0–0.5)
INR PPP: 2.1 (ref 0.8–1.2)
LACTATE SERPL-SCNC: 1.3 MMOL/L (ref 0.5–2.2)
LYMPHOCYTES # BLD AUTO: 0.9 K/UL (ref 1–4.8)
LYMPHOCYTES # BLD AUTO: 1.3 K/UL (ref 1–4.8)
LYMPHOCYTES NFR BLD: 15.9 % (ref 18–48)
LYMPHOCYTES NFR BLD: 23.1 % (ref 18–48)
MCH RBC QN AUTO: 30.5 PG (ref 27–31)
MCH RBC QN AUTO: 30.9 PG (ref 27–31)
MCHC RBC AUTO-ENTMCNC: 31.8 G/DL (ref 32–36)
MCHC RBC AUTO-ENTMCNC: 31.9 G/DL (ref 32–36)
MCV RBC AUTO: 96 FL (ref 82–98)
MCV RBC AUTO: 97 FL (ref 82–98)
MONOCYTES # BLD AUTO: 0.6 K/UL (ref 0.3–1)
MONOCYTES # BLD AUTO: 0.7 K/UL (ref 0.3–1)
MONOCYTES NFR BLD: 11.6 % (ref 4–15)
MONOCYTES NFR BLD: 12.7 % (ref 4–15)
NEUTROPHILS # BLD AUTO: 3.3 K/UL (ref 1.8–7.7)
NEUTROPHILS # BLD AUTO: 3.7 K/UL (ref 1.8–7.7)
NEUTROPHILS NFR BLD: 58 % (ref 38–73)
NEUTROPHILS NFR BLD: 66.9 % (ref 38–73)
NRBC BLD-RTO: 0 /100 WBC
NRBC BLD-RTO: 0 /100 WBC
PLATELET # BLD AUTO: 169 K/UL (ref 150–450)
PLATELET # BLD AUTO: 176 K/UL (ref 150–450)
PMV BLD AUTO: 10.2 FL (ref 9.2–12.9)
PMV BLD AUTO: 10.4 FL (ref 9.2–12.9)
POTASSIUM SERPL-SCNC: 3.9 MMOL/L (ref 3.5–5.1)
POTASSIUM SERPL-SCNC: 4.1 MMOL/L (ref 3.5–5.1)
PROCALCITONIN SERPL IA-MCNC: 3.76 NG/ML
PROTHROMBIN TIME: 21.5 SEC (ref 9–12.5)
RBC # BLD AUTO: 3.63 M/UL (ref 4–5.4)
RBC # BLD AUTO: 3.97 M/UL (ref 4–5.4)
SODIUM SERPL-SCNC: 139 MMOL/L (ref 136–145)
SODIUM SERPL-SCNC: 141 MMOL/L (ref 136–145)
WBC # BLD AUTO: 5.54 K/UL (ref 3.9–12.7)
WBC # BLD AUTO: 5.59 K/UL (ref 3.9–12.7)

## 2022-05-19 PROCEDURE — 63600175 PHARM REV CODE 636 W HCPCS: Performed by: INTERNAL MEDICINE

## 2022-05-19 PROCEDURE — 25000003 PHARM REV CODE 250: Performed by: STUDENT IN AN ORGANIZED HEALTH CARE EDUCATION/TRAINING PROGRAM

## 2022-05-19 PROCEDURE — 94761 N-INVAS EAR/PLS OXIMETRY MLT: CPT

## 2022-05-19 PROCEDURE — 63600175 PHARM REV CODE 636 W HCPCS: Performed by: HOSPITALIST

## 2022-05-19 PROCEDURE — 99223 1ST HOSP IP/OBS HIGH 75: CPT | Mod: GC,,, | Performed by: STUDENT IN AN ORGANIZED HEALTH CARE EDUCATION/TRAINING PROGRAM

## 2022-05-19 PROCEDURE — 85610 PROTHROMBIN TIME: CPT | Performed by: HOSPITALIST

## 2022-05-19 PROCEDURE — 99233 SBSQ HOSP IP/OBS HIGH 50: CPT | Mod: GC,,, | Performed by: INTERNAL MEDICINE

## 2022-05-19 PROCEDURE — 85025 COMPLETE CBC W/AUTO DIFF WBC: CPT | Performed by: STUDENT IN AN ORGANIZED HEALTH CARE EDUCATION/TRAINING PROGRAM

## 2022-05-19 PROCEDURE — 99233 PR SUBSEQUENT HOSPITAL CARE,LEVL III: ICD-10-PCS | Mod: ,,, | Performed by: INTERNAL MEDICINE

## 2022-05-19 PROCEDURE — 63600175 PHARM REV CODE 636 W HCPCS: Performed by: STUDENT IN AN ORGANIZED HEALTH CARE EDUCATION/TRAINING PROGRAM

## 2022-05-19 PROCEDURE — 25000003 PHARM REV CODE 250: Performed by: HOSPITALIST

## 2022-05-19 PROCEDURE — 80048 BASIC METABOLIC PNL TOTAL CA: CPT | Performed by: STUDENT IN AN ORGANIZED HEALTH CARE EDUCATION/TRAINING PROGRAM

## 2022-05-19 PROCEDURE — 20600001 HC STEP DOWN PRIVATE ROOM

## 2022-05-19 PROCEDURE — 99223 PR INITIAL HOSPITAL CARE,LEVL III: ICD-10-PCS | Mod: ,,, | Performed by: INTERNAL MEDICINE

## 2022-05-19 PROCEDURE — 93010 EKG 12-LEAD: ICD-10-PCS | Mod: ,,, | Performed by: INTERNAL MEDICINE

## 2022-05-19 PROCEDURE — 85025 COMPLETE CBC W/AUTO DIFF WBC: CPT | Mod: 91 | Performed by: HOSPITALIST

## 2022-05-19 PROCEDURE — 85730 THROMBOPLASTIN TIME PARTIAL: CPT | Performed by: HOSPITALIST

## 2022-05-19 PROCEDURE — 99223 1ST HOSP IP/OBS HIGH 75: CPT | Mod: ,,, | Performed by: INTERNAL MEDICINE

## 2022-05-19 PROCEDURE — 99223 PR INITIAL HOSPITAL CARE,LEVL III: ICD-10-PCS | Mod: GC,,, | Performed by: STUDENT IN AN ORGANIZED HEALTH CARE EDUCATION/TRAINING PROGRAM

## 2022-05-19 PROCEDURE — 93010 ELECTROCARDIOGRAM REPORT: CPT | Mod: ,,, | Performed by: INTERNAL MEDICINE

## 2022-05-19 PROCEDURE — 80048 BASIC METABOLIC PNL TOTAL CA: CPT | Mod: 91 | Performed by: HOSPITALIST

## 2022-05-19 PROCEDURE — 83880 ASSAY OF NATRIURETIC PEPTIDE: CPT | Performed by: STUDENT IN AN ORGANIZED HEALTH CARE EDUCATION/TRAINING PROGRAM

## 2022-05-19 PROCEDURE — 99233 PR SUBSEQUENT HOSPITAL CARE,LEVL III: ICD-10-PCS | Mod: GC,,, | Performed by: INTERNAL MEDICINE

## 2022-05-19 PROCEDURE — 99233 SBSQ HOSP IP/OBS HIGH 50: CPT | Mod: ,,, | Performed by: INTERNAL MEDICINE

## 2022-05-19 PROCEDURE — 36415 COLL VENOUS BLD VENIPUNCTURE: CPT | Performed by: STUDENT IN AN ORGANIZED HEALTH CARE EDUCATION/TRAINING PROGRAM

## 2022-05-19 PROCEDURE — 93005 ELECTROCARDIOGRAM TRACING: CPT

## 2022-05-19 RX ORDER — ACETAMINOPHEN 500 MG
1000 TABLET ORAL DAILY PRN
COMMUNITY

## 2022-05-19 RX ORDER — ZOLPIDEM TARTRATE 5 MG/1
10 TABLET ORAL NIGHTLY PRN
Status: DISCONTINUED | OUTPATIENT
Start: 2022-05-19 | End: 2022-05-23 | Stop reason: HOSPADM

## 2022-05-19 RX ADMIN — METOPROLOL SUCCINATE 50 MG: 50 TABLET, EXTENDED RELEASE ORAL at 08:05

## 2022-05-19 RX ADMIN — PREDNISOLONE ACETATE 1 DROP: 10 SUSPENSION/ DROPS OPHTHALMIC at 06:05

## 2022-05-19 RX ADMIN — SOTALOL HYDROCHLORIDE 80 MG: 80 TABLET ORAL at 08:05

## 2022-05-19 RX ADMIN — Medication 6 MG: at 08:05

## 2022-05-19 RX ADMIN — ALPRAZOLAM 0.25 MG: 0.25 TABLET ORAL at 06:05

## 2022-05-19 RX ADMIN — PREDNISOLONE ACETATE 1 DROP: 10 SUSPENSION/ DROPS OPHTHALMIC at 02:05

## 2022-05-19 RX ADMIN — ZOLPIDEM TARTRATE 10 MG: 5 TABLET ORAL at 08:05

## 2022-05-19 RX ADMIN — CEFTRIAXONE SODIUM 2 G: 2 INJECTION, SOLUTION INTRAVENOUS at 02:05

## 2022-05-19 RX ADMIN — PREDNISOLONE ACETATE 1 DROP: 10 SUSPENSION/ DROPS OPHTHALMIC at 09:05

## 2022-05-19 RX ADMIN — ENOXAPARIN SODIUM 60 MG: 100 INJECTION SUBCUTANEOUS at 11:05

## 2022-05-19 RX ADMIN — METOPROLOL SUCCINATE 100 MG: 100 TABLET, EXTENDED RELEASE ORAL at 08:05

## 2022-05-19 NOTE — ASSESSMENT & PLAN NOTE
61F with mechanical AoVR presented to clinic with several weeks of malaise and fevers, found to have +blood cultures and recommended for admission. She denies chest pain, shortness of breath, nausea, vomiting, or diarrhea.    - blood cultures 5/16 with strep mitis/oralis  - blood cultures 5/18 ngtd    - Vancomycin pharmacy consult to continue empiric therapy for Strep bacteremia  - ID consulted  - TTE negative for veg  - CELSO pending; tentatively planned for 5/23 due to full schedule  - EPS and CTS following      Sources differentials - recent sinus infection patient reported, transient bacteremia if pt with gingival bleeding while bruising due to chronic anticoagulation.  Also with recent Cataract surgery, suspect pt had peripheral IV placed, patient unsure

## 2022-05-19 NOTE — PLAN OF CARE
Patient received, awake and alert. Vitals per flowsheet. Tele monitoring continued, NSR at this time. Denies chest pain, SOB, NAD. Patient ambulates independently in room with steady gait. Continue IV abx therapy per orders. CELSO pending. Call bell within reach, no needs at this time    Problem: Adult Inpatient Plan of Care  Goal: Plan of Care Review  Outcome: Ongoing, Progressing  Goal: Patient-Specific Goal (Individualized)  Outcome: Ongoing, Progressing  Goal: Absence of Hospital-Acquired Illness or Injury  Outcome: Ongoing, Progressing  Goal: Optimal Comfort and Wellbeing  Outcome: Ongoing, Progressing  Goal: Readiness for Transition of Care  Outcome: Ongoing, Progressing     Problem: Infection  Goal: Absence of Infection Signs and Symptoms  Outcome: Ongoing, Progressing     Problem: Fall Injury Risk  Goal: Absence of Fall and Fall-Related Injury  Outcome: Ongoing, Progressing

## 2022-05-19 NOTE — HPI
Patient is a 61 y.o. female who has a past medical history of Hypertension, Mechanical AoVR in 1997 with subsequent CHB requiring PPM, S/P thoracic aortic aneurysm repair with Dacron graft (2007), and S/P VSD surgical patch repair 1997 presented with to [facility] ED for abnormal lab results. Patient had one year follow up with interventional cardiology yesterday where she complained of shortness of breath and fevers. She had labs drawn including blood cultures. 5/17 blood cultures returned positive for GPC in chains. Patient advised to go to local ED for further management. Blood cultures have been repeated. She was started on IV Vancomycin. Vitals stable.      EP was consulted for bacteremia and evaluation for PPM extraction. PPM is [employer] with 2 leads placed in 2017, and PM replaced in Mar 2015. Pt denies any issues at this time.      5/16/22 TTE   The left ventricle is normal in size with mild eccentric hypertrophy and normal systolic function.  Normal left ventricular diastolic function.  Normal right ventricular size with normal right ventricular systolic function.  There is a mechanical aortic valve present. There is trivial central aortic insufficiency present. Aortic valve area is 1.38 cm2; peak velocity is 3.38 m/s; mean gradient is 21 mmHg. The dimensionless index is 0.33.  Aortic valve area is 1.38 cm2; peak velocity is 3.38 m/s; mean gradient is 21 mmHg. The dimensionless index is 0.33.  A vascular plug is noted in the ascending aorta in a posterolateral location.  The estimated ejection fraction is 60%.  The ascending aorta is mildly dilated measuring 4.1 cm.  The aortic root at the sinuses of Valsalva is mildly dilated measuring 3.7 cm  Intermediate central venous pressure (8 mmHg).  The estimated PA systolic pressure is 35 mmHg.

## 2022-05-19 NOTE — ASSESSMENT & PLAN NOTE
Hx of AVR with a mechanical valve (1997) and aortic tube graft for endocarditis in 2007 c/b post operatively with  proximal anastomosis leak treated with a 10mm  Amplatzer Septal Occluder in 2009.

## 2022-05-19 NOTE — NURSING
Orders placed for telemetry. No bedside telemetry available. Tele notified and they stated pt would be on wait list for tele box. MD notified.

## 2022-05-19 NOTE — SUBJECTIVE & OBJECTIVE
Past Medical History:   Diagnosis Date    Heart disease     Hypertension     Pacemaker 11/25/2013    S/P Mechanical AVR 11/25/2013    S/P thoracic aortic aneurysm repair with Dacron graft (2007) 11/25/2013    S/P VSD surgical patch repair -1997 11/25/2013       Past Surgical History:   Procedure Laterality Date    ASCENDING AORTIC ANEURYSM REPAIR      BREAST SURGERY      CARDIAC CATHETERIZATION      CARDIAC PACEMAKER PLACEMENT      HYSTERECTOMY      MECHANICAL AORTIC VALVE REPLACEMENT         Review of patient's allergies indicates:   Allergen Reactions    Iodine Anaphylaxis     contrast    Lidocaine Anaphylaxis     cetacaine spray       Medications:  Facility-Administered Medications Prior to Admission   Medication    [DISCONTINUED] dexamethasone injection 2 mg     Medications Prior to Admission   Medication Sig    acetaminophen (TYLENOL) 500 MG tablet Take 1,000 mg by mouth daily as needed (pain/fever).    ALBUTEROL SULFATE (VENTOLIN HFA INHL) Inhale 1 puff into the lungs as needed (wheezing/shortness of breath).    alprazolam (XANAX) 0.25 MG tablet Take 0.25 mg by mouth 2 (two) times daily as needed for Anxiety.    loratadine (CLARITIN) 10 mg tablet TAKE ONE TABLET BY MOUTH DAILY AS NEEDED FOR ALLERGIES    metoprolol succinate (TOPROL-XL) 50 MG 24 hr tablet Take 1 tablet every Morning and take 2 tablets in the evening    potassium chloride (MICRO-K) 10 MEQ CpSR Take 10 mEq by mouth 2 (two) times daily.     prednisoLONE acetate (PRED FORTE) 1 % DrpS Place 1 drop into the right eye 4 (four) times daily.    sotalol (BETAPACE) 80 MG tablet Take 80 mg by mouth 2 (two) times daily.    valsartan (DIOVAN) 320 MG tablet Take 160 mg by mouth every 12 (twelve) hours. 1/2 in the morning 1/2 at night    WARFARIN SODIUM (COUMADIN ORAL) Take 5 mg by mouth once daily except on Thursday    zolpidem (AMBIEN) 10 mg Tab Take 10 mg by mouth every evening.     Antibiotics (From admission, onward)                 Start     Stop Route Frequency Ordered    22 0000  vancomycin 750 mg in dextrose 5 % 250 mL IVPB (ready to mix system)         -- IV Every 12 hours (non-standard times) 22 2235          Antifungals (From admission, onward)                None          Antivirals (From admission, onward)      None             Immunization History   Administered Date(s) Administered    Influenza - Quadrivalent - PF *Preferred* (6 months and older) 2017, 2018    MMR 2002, 09/10/2007       Family History       Problem Relation (Age of Onset)    Arrhythmia Mother    Heart disease Mother, Father    Heart failure Mother    Hypertension Mother, Father          Social History     Socioeconomic History    Marital status: Single   Tobacco Use    Smoking status: Former Smoker     Quit date: 2009     Years since quittin.5    Smokeless tobacco: Never Used   Substance and Sexual Activity    Alcohol use: No    Drug use: No     Review of Systems   Constitutional:  Positive for activity change, fatigue and fever (reports intermittent fevers at home). Negative for appetite change, chills, diaphoresis and unexpected weight change.   HENT:  Negative for congestion, dental problem, mouth sores, postnasal drip, rhinorrhea, sinus pressure, sinus pain and sore throat.    Eyes:  Negative for pain, discharge and itching.   Respiratory:  Negative for cough, chest tightness and shortness of breath.    Cardiovascular:  Positive for palpitations (chronic, infrequent). Negative for chest pain and leg swelling.   Gastrointestinal:  Negative for abdominal distention, abdominal pain, constipation, diarrhea, nausea and vomiting.   Genitourinary:  Negative for difficulty urinating, dysuria, flank pain, frequency, hematuria and urgency.   Musculoskeletal:  Negative for arthralgias, gait problem and myalgias.   Skin:  Negative for color change, pallor, rash and wound.   Neurological:  Negative for dizziness, weakness,  light-headedness, numbness and headaches.   Psychiatric/Behavioral:  Negative for agitation, confusion, decreased concentration, hallucinations and sleep disturbance. The patient is not nervous/anxious.    Objective:     Vital Signs (Most Recent):  Temp: 97.8 °F (36.6 °C) (05/19/22 1128)  Pulse: 70 (05/19/22 1128)  Resp: 18 (05/19/22 1128)  BP: 117/75 (05/19/22 1128)  SpO2: 100 % (05/19/22 1128) Vital Signs (24h Range):  Temp:  [97.6 °F (36.4 °C)-98.7 °F (37.1 °C)] 97.8 °F (36.6 °C)  Pulse:  [64-74] 70  Resp:  [16-18] 18  SpO2:  [96 %-100 %] 100 %  BP: ()/(50-78) 117/75     Weight: 58.5 kg (128 lb 15.5 oz)  Body mass index is 22.85 kg/m².    Estimated Creatinine Clearance: 59.5 mL/min (based on SCr of 0.8 mg/dL).    Physical Exam  Vitals and nursing note reviewed.   Constitutional:       General: She is not in acute distress.     Appearance: Normal appearance. She is normal weight. She is not ill-appearing, toxic-appearing or diaphoretic.   HENT:      Head: Normocephalic.      Nose: Nose normal. No congestion.      Mouth/Throat:      Mouth: Mucous membranes are moist.      Pharynx: Oropharynx is clear.   Eyes:      General:         Right eye: No discharge.         Left eye: No discharge.      Conjunctiva/sclera: Conjunctivae normal.   Cardiovascular:      Rate and Rhythm: Normal rate and regular rhythm.      Pulses: Normal pulses.      Heart sounds: Murmur heard.     No friction rub. No gallop.   Pulmonary:      Effort: Pulmonary effort is normal. No respiratory distress.      Breath sounds: Normal breath sounds. No stridor. No wheezing, rhonchi or rales.   Chest:      Chest wall: No tenderness.   Abdominal:      General: Abdomen is flat. Bowel sounds are normal. There is no distension.      Palpations: Abdomen is soft. There is no mass.      Tenderness: There is no abdominal tenderness. There is no guarding or rebound.   Musculoskeletal:         General: No swelling, tenderness, deformity or signs of injury.  Normal range of motion.      Cervical back: Normal range of motion.      Right lower leg: No edema.      Left lower leg: No edema.   Skin:     General: Skin is warm and dry.   Neurological:      General: No focal deficit present.      Mental Status: She is alert and oriented to person, place, and time. Mental status is at baseline.      Motor: No weakness.      Gait: Gait normal.   Psychiatric:         Mood and Affect: Mood normal.         Behavior: Behavior normal.         Thought Content: Thought content normal.         Judgment: Judgment normal.       Significant Labs: All pertinent labs within the past 24 hours have been reviewed.    Significant Imaging: I have reviewed all pertinent imaging results/findings within the past 24 hours.

## 2022-05-19 NOTE — PHARMACY MED REC
"Admission Medication History     The home medication history was taken by Lashawn Perez.    You may go to "Admission" then "Reconcile Home Medications" tabs to review and/or act upon these items.      The home medication list has been updated by the Pharmacy department.    Please read ALL comments highlighted in yellow.    Please address this information as you see fit.     Feel free to contact us if you have any questions or require assistance.    Medications listed below were obtained from: Patient/family    PTA Medications   Medication Sig    acetaminophen (TYLENOL) 500 MG tablet   Take 1,000 mg by mouth daily as needed (pain/fever).    ALBUTEROL SULFATE (VENTOLIN HFA INHL)   Inhale 1 puff into the lungs as needed (wheezing/shortness of breath).    alprazolam (XANAX) 0.25 MG tablet   Take 0.25 mg by mouth 2 (two) times daily as needed for Anxiety.    loratadine (CLARITIN) 10 mg tablet   TAKE ONE TABLET BY MOUTH DAILY AS NEEDED FOR ALLERGIES    metoprolol succinate (TOPROL-XL) 50 MG 24 hr tablet   Take 1 tablet every Morning and take 2 tablets in the evening    potassium chloride (MICRO-K) 10 MEQ CpSR   Take 10 mEq by mouth 2 (two) times daily.     prednisoLONE acetate (PRED FORTE) 1 % DrpS   Place 1 drop into the right eye 4 (four) times daily.    sotalol (BETAPACE) 80 MG tablet   Take 80 mg by mouth 2 (two) times daily.    valsartan (DIOVAN) 320 MG tablet   Take 160 mg by mouth every 12 (twelve) hours. 1/2 in the morning 1/2 at night    WARFARIN SODIUM (COUMADIN ORAL)   Take 5 mg by mouth once daily except on Thursday    zolpidem (AMBIEN) 10 mg Tab Take 10 mg by mouth every evening.           Lashawn Perez  EXT [id number]                  .          "

## 2022-05-19 NOTE — ASSESSMENT & PLAN NOTE
Patient reports chronically paced  - EP consult to review and discuss options with patient  - CELSO pending; will likely be 5/23

## 2022-05-19 NOTE — ASSESSMENT & PLAN NOTE
BCx 5/16 growing streptococcus mitis. Sources likely oral vs valvular vs pacemaker. Hemodynamically stable and no clinical s/s heart failure. TTE with moderate AS, which is a progression from TTE last year.    - Patient has presumed mechanical AVR endocarditis   - Recommend CELSO to evaluate AVR/PPM  - Recommend CTS consult for surgical options for valve replacement; deemed inoperable previously but would appreciate re-evaluation  - No options to offer from interventional cardiology perspective  - Continue Vancomycin  - Follow-up repeat BCx  - ID consult to guide ABx therapy and duration  - Needs outpt dental evaluation as possible source of bacteremia

## 2022-05-19 NOTE — ASSESSMENT & PLAN NOTE
Started vancomycin 1250mg on 5/17 @ 1700, & 1250mg dose today @ 1200pm.    -Vancomycin pharmacy consult to continue empiric therapy for Strep bacteremia  -patient reports that surveillance blood culture was drawn earlier today - cannot see this via Care-everywhere, will obtain Surveillance Blood culture set tonight   -ECHO completed on 5/16 as outpatient, no vegetations noted.  Pt may need CELSO this admission pending Cardiology consultant raj. - Interventional cards & EP cardiology consults placed.   -ID consult placed  -Check Lactic Acid     -Sources - recent sinus infection patient reported, transient bacteremia if pt with gingival bleeding while bruising due to chronic anticoagulation.  Also with recent Cataract surgery, suspect pt had peripheral IV placed, patient unsure

## 2022-05-19 NOTE — ASSESSMENT & PLAN NOTE
S/p right cataract/lens surgery on 5/6 - receiving Steroid eye drops Pred-forte 4 times daily - continue for now  -left eye was to be this week, will be delayed for time being.     Pt offered eye shield due to photophobia, declines at this time

## 2022-05-19 NOTE — HPI
Patient is a 61 y.o. female who has a past medical history of Hypertension, s/p Pacemaker, S/P Mechanical AoVR, S/P thoracic aortic aneurysm repair with Dacron graft (2007), and S/P VSD surgical patch repair 1997 presented with to [facility] ED for abnormal lab results. Patient had one year follow up with interventional cardiology yesterday where she complained of shortness of breath and fevers. She had labs drawn including blood cultures. 5/17 blood cultures returned positive for GPC in chains. Patient advised to go to local ED for further management. On arrival to ED work up unrevealing. Blood cultures have been repeated. She was started on IV Vancomycin. Vitals stable. Facility seeking transfer for ID and cardiology evaluation. Stable for transfer.       Interventional cardiology consulted for bacteremia and valvular heart disease evaluation.

## 2022-05-19 NOTE — PLAN OF CARE
Have been following this lady for 10 or 15 years with Dr. Delatorre.  I have told him about her admission with possible/probable endocarditis.  He is happy to see her and re-evaluate whether she is a surgical candidate.  She is clearly very high risk for redo surgery with a prior ascending aortic and aortic valve repair.  He is expecting you to consult him.

## 2022-05-19 NOTE — H&P
[facility] - Telemetry [facility] Medicine  History & Physical    Patient Name: Naina Keyes  MRN: [id number]  Patient Class: IP- Inpatient  Admission Date: 5/18/2022  Attending Physician: Huy Chau MD [facility]  Admitting Physician: Mesfin Warren MD      Primary Care Provider: Anjana Torres NP         Patient information was obtained from patient, past medical records and Care Everywhere records.     Subjective:     Principal Problem:Streptococcal bacteremia    Chief Complaint:   Chief Complaint   Patient presents with    bacteremia        HPI: TRANSFER CENTER PHYSICIAN SUMMARY  Patient is a 61 y.o. female who has a past medical history of Hypertension, s/p Pacemaker, S/P Mechanical AoVR, S/P thoracic aortic aneurysm repair with Dacron graft (2007), and S/P VSD surgical patch repair 1997 presented with to [facility] ED for abnormal lab results. Patient had one year follow up with interventional cardiology yesterday where she complained of shortness of breath and fevers. She had labs drawn including blood cultures. 5/17 blood cultures returned positive for GPC in chains. Patient advised to go to local ED for further management. On arrival to ED work up unrevealing. Blood cultures have been repeated. She was started on IV Vancomycin. Vitals stable. Facility seeking transfer for ID and cardiology evaluation. Stable for transfer.      BEDSIDE EVAL  Seen in cardiology clinic on 5/16 - had c/o of fevers, fatigue, malaise and blood cultures were sent, ECHO completed and patient notified on 5/17 of positive blood culture results, she was advised to seek care,  ID staff Dr. Frieda Gutierrez requested patient transfer [place] [facility] [place].     Patient reports that 6-8 weeks ago she had a bad sinus infection that transitioned to itchy/watery eyes and had a persistent non-productive cough, overall hasn't felt well since this time, her fevers have been sporadic with  progressively worsening fatigue prior to her clinic appointment 5/16.  She has been adherent to home medications including her anti-hypertensives.      She notes gingival bleeding when brushing her teeth, unsure of her last INR, chronically on Coumadin.  She had Right Eye Cataract surgery on 05/06/22, supposed to have Left Eye completed this week.  No dental procedures, denies any skin changes or rash.   She has hx of Mechanical aortic valve, Pacemaker placement (98% paced) and has Left knee arthroplasty       Past Medical History:   Diagnosis Date    Heart disease     Hypertension     Pacemaker 11/25/2013    S/P Mechanical AVR 11/25/2013    S/P thoracic aortic aneurysm repair with Dacron graft (2007) 11/25/2013    S/P VSD surgical patch repair -1997 11/25/2013       Past Surgical History:   Procedure Laterality Date    ASCENDING AORTIC ANEURYSM REPAIR      BREAST SURGERY      CARDIAC CATHETERIZATION      CARDIAC PACEMAKER PLACEMENT      HYSTERECTOMY      MECHANICAL AORTIC VALVE REPLACEMENT         Review of patient's allergies indicates:   Allergen Reactions    Iodine Anaphylaxis     contrast    Lidocaine Anaphylaxis     cetacaine spray       No current facility-administered medications on file prior to encounter.     Current Outpatient Medications on File Prior to Encounter   Medication Sig    ALBUTEROL SULFATE (VENTOLIN HFA INHL) Inhale into the lungs as needed.    alprazolam (XANAX) 0.25 MG tablet Take 0.5 mg by mouth as needed.    EScitalopram oxalate (LEXAPRO) 10 MG tablet Take 10 mg by mouth once daily.    loratadine (CLARITIN) 10 mg tablet TAKE ONE TABLET BY MOUTH DAILY AS NEEDED FOR ALLERGIES    magnesium 200 mg Tab Take 500 mg by mouth once daily.     metoprolol succinate (TOPROL-XL) 100 MG 24 hr tablet Take 50 mg by mouth 2 (two) times daily. Pt  Take  1/2  Tab  In  Am  And 100mg  Pm    metoprolol tartrate (LOPRESSOR) 50 MG tablet Take 50 mg by mouth 2 (two) times daily.    ofloxacin  (OCUFLOX) 0.3 % ophthalmic solution INSTILL ONE DROP IN THE RIGHT EYE FOUR TIMES DAILY    olopatadine (PATANASE) 0.6 % nasal spray 1 spray by Nasal route once daily.    potassium chloride (MICRO-K) 10 MEQ CpSR Take 10 mEq by mouth 2 (two) times daily.     prednisoLONE acetate (PRED FORTE) 1 % DrpS Place 1 drop into the right eye 4 (four) times daily.    predniSONE (DELTASONE) 10 MG tablet     sotalol (BETAPACE) 80 MG tablet 80 mg 2 (two) times daily.     valsartan (DIOVAN) 160 MG tablet Take 160 mg by mouth once daily.    valsartan (DIOVAN) 320 MG tablet Take 320 mg by mouth every 12 (twelve) hours. 1/2 in the morning 1/2 at night    WARFARIN SODIUM (COUMADIN ORAL) Take 5 mg by mouth once daily. Pt is taking 5 mg daily except on Saturday and Monday pt takes 2.5mg    zolpidem (AMBIEN) 10 mg Tab Take 10 mg by mouth nightly as needed.     Family History       Problem Relation (Age of Onset)    Arrhythmia Mother    Heart disease Mother, Father    Heart failure Mother    Hypertension Mother, Father          Tobacco Use    Smoking status: Former Smoker     Quit date: 2009     Years since quittin.5    Smokeless tobacco: Never Used   Substance and Sexual Activity    Alcohol use: No    Drug use: No    Sexual activity: Not on file     Review of Systems   Constitutional:  Positive for chills, fatigue and fever.   HENT:  Negative for sore throat and trouble swallowing.    Eyes:  Positive for photophobia (right eye).   Respiratory:  Positive for cough. Negative for shortness of breath and wheezing.    Cardiovascular:  Negative for chest pain, palpitations and leg swelling.   Gastrointestinal:  Negative for nausea and vomiting.   Genitourinary:  Negative for dysuria.   Musculoskeletal:  Negative for arthralgias, back pain and gait problem.   Skin:  Negative for rash.   Neurological:  Negative for dizziness and headaches.   Psychiatric/Behavioral:  Negative for confusion.    Objective:     Vital Signs  (Most Recent):  Temp: 97.7 °F (36.5 °C) (05/18/22 2004)  Pulse: 74 (05/18/22 2004)  Resp: 17 (05/18/22 2004)  BP: 133/68 (05/18/22 2004)  SpO2: 99 % (05/18/22 2004) Vital Signs (24h Range):  Temp:  [97.7 °F (36.5 °C)-98.7 °F (37.1 °C)] 97.7 °F (36.5 °C)  Pulse:  [64-79] 74  Resp:  [17-19] 17  SpO2:  [95 %-100 %] 99 %  BP: ()/(50-68) 133/68        There is no height or weight on file to calculate BMI.    Physical Exam  Constitutional:       General: She is not in acute distress.     Appearance: She is not toxic-appearing.   HENT:      Head: Normocephalic and atraumatic.      Mouth/Throat:      Pharynx: No oropharyngeal exudate.   Eyes:      Pupils: Pupils are equal, round, and reactive to light.   Cardiovascular:      Rate and Rhythm: Normal rate and regular rhythm.      Heart sounds: Murmur heard.      Comments: End-Systolic click  Pulmonary:      Effort: Pulmonary effort is normal. No respiratory distress.      Breath sounds: Normal breath sounds. No wheezing.   Abdominal:      General: Bowel sounds are normal. There is no distension.      Palpations: Abdomen is soft.      Tenderness: There is no abdominal tenderness. There is no guarding.   Musculoskeletal:         General: No swelling.      Right lower leg: No edema.      Left lower leg: No edema.      Comments: No knee joint effusions   Skin:     General: Skin is warm and dry.      Findings: No rash.   Neurological:      General: No focal deficit present.      Mental Status: She is alert and oriented to person, place, and time.         CRANIAL NERVES     CN III, IV, VI   Pupils are equal, round, and reactive to light.     Significant Labs: All pertinent labs within the past 24 hours have been reviewed.  Blood Culture: No results for input(s): LABBLOO in the last 48 hours.  CBC: No results for input(s): WBC, HGB, HCT, PLT in the last 48 hours.  CMP: No results for input(s): NA, K, CL, CO2, GLU, BUN, CREATININE, CALCIUM, PROT, ALBUMIN, BILITOT, ALKPHOS, AST,  ALT, ANIONGAP, EGFRNONAA in the last 48 hours.    Invalid input(s): ESTGFAFRICA  Cardiac Markers: No results for input(s): CKMB, MYOGLOBIN, BNP, TROPISTAT in the last 48 hours.    Component      Sodium   Potassium   Chloride   CO2   Albumin   Alkaline Phosphatase   ALT   AST   Bilirubin, Total   BUN   Calcium   Creatinine   Total Protein   Glucose   Osmolality Calc   Anion Gap   A/G Ratio   Globulin   Non-AF American GFR   AF American GFR   BUN/Creatinine Ratio   Magnesium   Uric Acid   A/G ratio   eGFR   Lactic Acid, Plasma     Component 05/17/2022 05/05/2022        Sodium 133 Low     136   Potassium 4.2 4.8   Chloride 99 104   CO2 22 24   Albumin 4.2 4.1   Alkaline Phosphatase 94 114   ALT 11 28   AST 21 46 High       Bilirubin, Total 0.6 1.2   BUN 15 20   Calcium 9.4 9.4   Creatinine 0.87 1.17 High       Total Protein 7.5 7.7   Glucose 84 109 High       Osmolality Calc 266 Low     275   Anion Gap 12 8   A/G Ratio -- 1.1   Globulin 3.3 3.6   Non-AF American GFR >60 --   AF American GFR >60 --   BUN/Creatinine Ratio -- --   Magnesium -- --   Uric Acid -- --   A/G ratio 1.3 --   eGFR -- 52   Lactic Acid, Plasma --      Component      WBC   RBC   Hemoglobin   Hematocrit   MCV   MCH   MCHC   RDW   Platelets   MPV   Lymphocytes Relative   Lymphocytes Absolute   Monocytes Relative   Monocytes Absolute   Granulocytes Relative   Granulocytes Absolute   Platelet Count- Automated   Granulocyte Relative   Eosinophils Relative   Basophils Relative   Lymphocyte Absolute   Monocyte Absolute   Eosinophils Absolute   Basophils Absolute   ANC   Neutrophils Absolute   Neutrophils Relative     Component 05/17/2022 05/05/2022          WBC 7.0 6.0 Load older lab results   RBC 3.94 Low     4.73 Load older lab results   Hemoglobin 12.1 14.8 Load older lab results   Hematocrit 36.9 Low     46.1 Load older lab results   MCV 94 97 Load older lab results   MCH 31 31.2 Load older lab results   MCHC 33 32.0 Load older lab results   RDW 13.9  13.9 Load older lab results   Platelets -- 185 Load older lab results   MPV 8.3 7.6 Load older lab results   Lymphocytes Relative 12.1 Low     13.7 Load older lab results   Lymphocytes Absolute -- 0.8 Load older lab results   Monocytes Relative 11.6 High     6.3 Load older lab results   Monocytes Absolute -- 0.3 Load older lab results   Granulocytes Relative -- 80.0 Load older lab results   Granulocytes Absolute 5.1 4.9 Load older lab results   Platelet Count- Automated 191 -- Load older lab results   Granulocyte Relative 72.6 -- Load older lab results   Eosinophils Relative 2.5 -- Load older lab results   Basophils Relative 1.2 -- Load older lab results   Lymphocyte Absolute 0.9 Low     --        Vancomycin, trough (05/18/2022 11:41 AM CDT)  Lab Results - Vancomycin, trough (05/18/2022 11:41 AM CDT)  Component Value Ref Range Test Method Analysis Time Performed At [facility] [address]   Vancomycin Trough 8.8 5.0 - 19.9 ug/mL   05/18/2022 12:33 PM CDT [facility]       Lab Results - Vancomycin, trough (05/18/2022 11:41 AM CDT)  Specimen (Source) Anatomical Location / Laterality Collection Method / Volume Collection Time Received Time   Blood BLOOD CELL / Unknown Venipuncture / Unknown 05/18/2022 11:41 AM CDT 05/18/2022 12:08 PM CDT       Significant Imaging: I have reviewed all pertinent imaging results/findings within the past 24 hours.  X-ray chest - AP portable    Result Date: 5/17/2022  Chest one view HISTORY: Cough COMPARISON: 7/30/2021 Stable cardiomegaly sternal silhouette with overlying median sternotomy changes and indwelling cardiac pacer. Stable mild interstitial prominence without focal consolidation, mass or effusion.     Stable exam with no acute abnormality seen. This report was signed by David Wallis MD on 5/17/2022 3:53 PM on the following workstation: [id number].       TTE 5/16/22:   · The left ventricle is normal in size with mild eccentric hypertrophy and normal systolic  function.  · Normal left ventricular diastolic function.  · Normal right ventricular size with normal right ventricular systolic function.  · There is a mechanical aortic valve present. There is trivial central aortic insufficiency present. Aortic valve area is 1.38 cm2; peak velocity is 3.38 m/s; mean gradient is 21 mmHg. The dimensionless index is 0.33.  · Aortic valve area is 1.38 cm2; peak velocity is 3.38 m/s; mean gradient is 21 mmHg. The dimensionless index is 0.33.  · A vascular plug is noted in the ascending aorta in a posterolateral location.  · The estimated ejection fraction is 60%.  · The ascending aorta is mildly dilated measuring 4.1 cm.  · The aortic root at the sinuses of Valsalva is mildly dilated measuring 3.7 cm  · Intermediate central venous pressure (8 mmHg).  · The estimated PA systolic pressure is 35 mmHg.       Assessment/Plan:     * Streptococcal bacteremia  Started vancomycin 1250mg on 5/17 @ 1700, & 1250mg dose today @ 1200pm.    -Vancomycin pharmacy consult to continue empiric therapy for Strep bacteremia  -patient reports that surveillance blood culture was drawn earlier today - cannot see this via Care-everywhere, will obtain Surveillance Blood culture set tonight   -ECHO completed on 5/16 as outpatient, no vegetations noted.  Pt may need CELSO this admission pending Cardiology consultant raj. - Interventional cards & EP cardiology consults placed.   -ID consult placed  -Check Lactic Acid     -Sources - recent sinus infection patient reported, transient bacteremia if pt with gingival bleeding while bruising due to chronic anticoagulation.  Also with recent Cataract surgery, suspect pt had peripheral IV placed, patient unsure    Pacemaker  Patient reports chronically paced  -she would prefer not to have any explantation of devices during admission  -EP consult to review and discuss options with patient.       Warfarin anticoagulation  On warfarin 5mg daily   -check daily INR  -given  that patient may require diagnostic or other invasive procedures this admit - switch to therapeutic lovenox 1mg/kg [id number]       Age-related cataract of both eyes  S/p right cataract/lens surgery on 5/6 - receiving Steroid eye drops Pred-forte 4 times daily - continue for now  -left eye was to be this week, will be delayed for time being.     Pt offered eye shield due to photophobia, declines at this time        VTE Risk Mitigation (From admission, onward)         Ordered     enoxaparin injection 60 mg  Every 12 hours (non-standard times)         05/18/22 2233     IP VTE LOW RISK PATIENT  Once         05/18/22 2129     Place sequential compression device  Until discontinued         05/18/22 2129                   Mesfin Warren MD  Department of Hospital Medicine   [facility] - Telemetry Stepdown

## 2022-05-19 NOTE — HOSPITAL COURSE
61F with extensive cardiac history, including pacemaker, s/p mechanical AoVR, s/p thoracic aortic aneurysm repair, and s/p VSD patch repair. She is here with streptococcal bacteremia (+cultures 5/16) after several weeks of fatigue and undulating fevers. EPS, ID, and CTS consulted - see notes; no surgery planned. She was started on vanc; culture resulted strep mitis pan-sensitive so de-escalated to ceftriaxone. Cultures cleared on 5/18. TTE negative for vegetation.  Discussed with cardiology and ID with plan for 6 weeks IV CTX outpatient with newly placed PICC.  CXR confirming position of line.  Discussed with EP and decision to hold home sotalol on discharge and close follow up outpatient with concern for QT prolongation.  Continuing home warfarin on discharge.  Close follow up with ID on discharge.

## 2022-05-19 NOTE — ASSESSMENT & PLAN NOTE
Pt reports CHB after AVR in 1997. PPM is [employer] with 2 leads placed in 2017, and PM replaced in Mar 2015.     -Will order formal device interrogation   -Will follow up CELSO results   -Agree with ID consult   -Will having ongoing evaluation for PPM extraction

## 2022-05-19 NOTE — NURSING
Pt arrived at 2000 via stretcher by ambulance - MD notified. VSS. Pt with belongings. Pt educated on call bell use and room settings. Bed in lowest position, call light within reach, non skid socks on, bed alarm refused, instructed to call staff for needs.
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
## 2022-05-19 NOTE — PROGRESS NOTES
[facility] - Telemetry [facility] Medicine  Progress Note    Patient Name: Naina Keyes  MRN: [id number]  Patient Class: IP- Inpatient   Admission Date: 5/18/2022  Length of Stay: 1 days  Attending Physician: Jaz Vicente MD  Primary Care Provider: Anjana Torres NP        Subjective:     Principal Problem:Streptococcal bacteremia        HPI:  TRANSFER CENTER PHYSICIAN SUMMARY  Patient is a 61 y.o. female who has a past medical history of Hypertension, s/p Pacemaker, S/P Mechanical AoVR, S/P thoracic aortic aneurysm repair with Dacron graft (2007), and S/P VSD surgical patch repair 1997 presented with to [facility] ED for abnormal lab results. Patient had one year follow up with interventional cardiology yesterday where she complained of shortness of breath and fevers. She had labs drawn including blood cultures. 5/17 blood cultures returned positive for GPC in chains. Patient advised to go to local ED for further management. On arrival to ED work up unrevealing. Blood cultures have been repeated. She was started on IV Vancomycin. Vitals stable. Facility seeking transfer for ID and cardiology evaluation. Stable for transfer.      BEDSIDE EVAL  Seen in cardiology clinic on 5/16 - had c/o of fevers, fatigue, malaise and blood cultures were sent, ECHO completed and patient notified on 5/17 of positive blood culture results, she was advised to seek care,  ID staff Dr. Frieda Gutierrez requested patient transfer to [facility] from [facility] ED.     Patient reports that 6-8 weeks ago she had a bad sinus infection that transitioned to itchy/watery eyes and had a persistent non-productive cough, overall hasn't felt well since this time, her fevers have been sporadic with progressively worsening fatigue prior to her clinic appointment 5/16.  She has been adherent to home medications including her anti-hypertensives.      She notes gingival bleeding when brushing her teeth, unsure of her last INR,  chronically on Coumadin.  She had Right Eye Cataract surgery on 05/06/22, supposed to have Left Eye completed this week.  No dental procedures, denies any skin changes or rash.   She has hx of Mechanical aortic valve, Pacemaker placement (98% paced) and has Left knee arthroplasty       Overview/Hospital Course:  61F with extensive cardiac history, including pacemaker, s/p mechanical AoVR, s/p thoracic aortic aneurysm repair, and s/p VSD patch repair. She is here with streptococcal bacteremia after several weeks of fatigue and undulating fevers. She was started on vanco, pending susceptibility. EPS, ID, and CTS consulted. TTE negative for vegetation, CELSO pending.       Interval History: NAEON. CELSO schedule for this week is full so will likely be on 5/23.    Review of Systems   Constitutional:  Positive for chills, fatigue and fever.   HENT:  Negative for sore throat and trouble swallowing.    Eyes:  Positive for photophobia (right eye).   Respiratory:  Positive for cough. Negative for shortness of breath and wheezing.    Cardiovascular:  Negative for chest pain, palpitations and leg swelling.   Gastrointestinal:  Negative for nausea and vomiting.   Genitourinary:  Negative for dysuria.   Musculoskeletal:  Negative for arthralgias, back pain and gait problem.   Skin:  Negative for rash.   Neurological:  Negative for dizziness and headaches.   Psychiatric/Behavioral:  Negative for confusion.    Objective:     Vital Signs (Most Recent):  Temp: 97.8 °F (36.6 °C) (05/19/22 1128)  Pulse: 70 (05/19/22 1128)  Resp: 18 (05/19/22 1128)  BP: 117/75 (05/19/22 1128)  SpO2: 100 % (05/19/22 1128) Vital Signs (24h Range):  Temp:  [97.6 °F (36.4 °C)-98.7 °F (37.1 °C)] 97.8 °F (36.6 °C)  Pulse:  [64-74] 70  Resp:  [16-18] 18  SpO2:  [96 %-100 %] 100 %  BP: ()/(50-78) 117/75     Weight: 58.5 kg (128 lb 15.5 oz)  Body mass index is 22.85 kg/m².    Intake/Output Summary (Last 24 hours) at 5/19/2022 1344  Last data filed at  5/19/2022 0116  Gross per 24 hour   Intake 643.06 ml   Output --   Net 643.06 ml      Physical Exam  Vitals and nursing note reviewed.   Constitutional:       General: She is not in acute distress.     Appearance: Normal appearance. She is not ill-appearing or toxic-appearing.   HENT:      Head: Normocephalic and atraumatic.      Right Ear: External ear normal.      Left Ear: External ear normal.      Nose: Nose normal.      Mouth/Throat:      Mouth: Mucous membranes are moist.      Pharynx: Oropharynx is clear.   Eyes:      Extraocular Movements: Extraocular movements intact.   Cardiovascular:      Rate and Rhythm: Normal rate.      Pulses: Normal pulses.      Heart sounds: Murmur heard.      Comments: Mid-systolic click  Systolic ejection murmur  Pulmonary:      Effort: Pulmonary effort is normal. No respiratory distress.      Breath sounds: Normal breath sounds. No wheezing.   Abdominal:      General: Abdomen is flat. Bowel sounds are normal. There is no distension.      Palpations: Abdomen is soft.      Tenderness: There is no abdominal tenderness.   Musculoskeletal:         General: No swelling.      Cervical back: Neck supple. No tenderness.      Right lower leg: No edema.      Left lower leg: No edema.   Lymphadenopathy:      Cervical: No cervical adenopathy.   Skin:     Findings: No bruising or rash.      Comments: No osler nodes or janeway lesions   Neurological:      General: No focal deficit present.      Mental Status: She is alert and oriented to person, place, and time.      Motor: No weakness.   Psychiatric:         Mood and Affect: Mood normal.         Thought Content: Thought content normal.       Significant Labs: All pertinent labs within the past 24 hours have been reviewed.    Significant Imaging: I have reviewed all pertinent imaging results/findings within the past 24 hours.      Assessment/Plan:      * Streptococcal bacteremia  61F with mechanical AoVR presented to clinic with several weeks  of malaise and fevers, found to have +blood cultures and recommended for admission. She denies chest pain, shortness of breath, nausea, vomiting, or diarrhea.    - blood cultures 5/16 with strep mitis/oralis  - blood cultures 5/18 ngtd    - Vancomycin pharmacy consult to continue empiric therapy for Strep bacteremia  - ID consulted  - TTE negative for veg  - CELSO pending; tentatively planned for 5/23 due to full schedule  - EPS and CTS following      Sources differentials - recent sinus infection patient reported, transient bacteremia if pt with gingival bleeding while bruising due to chronic anticoagulation.  Also with recent Cataract surgery, suspect pt had peripheral IV placed, patient unsure    Age-related cataract of both eyes  S/p right cataract/lens surgery on 5/6 - receiving Steroid eye drops Pred-forte 4 times daily - continue for now  -left eye was to be this week, will be delayed for time being.     Pt offered eye shield due to photophobia, declines at this time      Warfarin anticoagulation  On warfarin 5mg daily   - check daily INR  - given that patient may require diagnostic or other invasive procedures this admit - switch to therapeutic lovenox 1mg/kg [id number]       Pacemaker  Patient reports chronically paced  - EP consult to review and discuss options with patient  - CELSO pending; will likely be 5/23        VTE Risk Mitigation (From admission, onward)         Ordered     enoxaparin injection 60 mg  Every 12 hours (non-standard times)         05/18/22 2233     IP VTE LOW RISK PATIENT  Once         05/18/22 2129     Place sequential compression device  Until discontinued         05/18/22 2129                Discharge Planning   OFELIA: 5/24/2022     Code Status: Full Code   Is the patient medically ready for discharge?: No    Reason for patient still in hospital (select all that apply): Laboratory test, Treatment, Imaging, Consult recommendations, PT / OT recommendations and Pending disposition  Discharge  Plan A: Home with family                  Melonie Rosenberg DO  Department of Hospital Medicine   [facility] - Telemetry Stepdown

## 2022-05-19 NOTE — SUBJECTIVE & OBJECTIVE
Interval History: NAEON. CELSO schedule for this week is full so will likely be on 5/23.    Review of Systems   Constitutional:  Positive for chills, fatigue and fever.   HENT:  Negative for sore throat and trouble swallowing.    Eyes:  Positive for photophobia (right eye).   Respiratory:  Positive for cough. Negative for shortness of breath and wheezing.    Cardiovascular:  Negative for chest pain, palpitations and leg swelling.   Gastrointestinal:  Negative for nausea and vomiting.   Genitourinary:  Negative for dysuria.   Musculoskeletal:  Negative for arthralgias, back pain and gait problem.   Skin:  Negative for rash.   Neurological:  Negative for dizziness and headaches.   Psychiatric/Behavioral:  Negative for confusion.    Objective:     Vital Signs (Most Recent):  Temp: 97.8 °F (36.6 °C) (05/19/22 1128)  Pulse: 70 (05/19/22 1128)  Resp: 18 (05/19/22 1128)  BP: 117/75 (05/19/22 1128)  SpO2: 100 % (05/19/22 1128) Vital Signs (24h Range):  Temp:  [97.6 °F (36.4 °C)-98.7 °F (37.1 °C)] 97.8 °F (36.6 °C)  Pulse:  [64-74] 70  Resp:  [16-18] 18  SpO2:  [96 %-100 %] 100 %  BP: ()/(50-78) 117/75     Weight: 58.5 kg (128 lb 15.5 oz)  Body mass index is 22.85 kg/m².    Intake/Output Summary (Last 24 hours) at 5/19/2022 1344  Last data filed at 5/19/2022 0116  Gross per 24 hour   Intake 643.06 ml   Output --   Net 643.06 ml      Physical Exam  Vitals and nursing note reviewed.   Constitutional:       General: She is not in acute distress.     Appearance: Normal appearance. She is not ill-appearing or toxic-appearing.   HENT:      Head: Normocephalic and atraumatic.      Right Ear: External ear normal.      Left Ear: External ear normal.      Nose: Nose normal.      Mouth/Throat:      Mouth: Mucous membranes are moist.      Pharynx: Oropharynx is clear.   Eyes:      Extraocular Movements: Extraocular movements intact.   Cardiovascular:      Rate and Rhythm: Normal rate.      Pulses: Normal pulses.      Heart sounds:  Murmur heard.      Comments: Mid-systolic click  Systolic ejection murmur  Pulmonary:      Effort: Pulmonary effort is normal. No respiratory distress.      Breath sounds: Normal breath sounds. No wheezing.   Abdominal:      General: Abdomen is flat. Bowel sounds are normal. There is no distension.      Palpations: Abdomen is soft.      Tenderness: There is no abdominal tenderness.   Musculoskeletal:         General: No swelling.      Cervical back: Neck supple. No tenderness.      Right lower leg: No edema.      Left lower leg: No edema.   Lymphadenopathy:      Cervical: No cervical adenopathy.   Skin:     Findings: No bruising or rash.      Comments: No osler nodes or janeway lesions   Neurological:      General: No focal deficit present.      Mental Status: She is alert and oriented to person, place, and time.      Motor: No weakness.   Psychiatric:         Mood and Affect: Mood normal.         Thought Content: Thought content normal.       Significant Labs: All pertinent labs within the past 24 hours have been reviewed.    Significant Imaging: I have reviewed all pertinent imaging results/findings within the past 24 hours.

## 2022-05-19 NOTE — PLAN OF CARE
Problem: Adult Inpatient Plan of Care  Goal: Plan of Care Review  Outcome: Ongoing, Progressing     Problem: Adult Inpatient Plan of Care  Goal: Patient-Specific Goal (Individualized)  Outcome: Ongoing, Progressing     Problem: Adult Inpatient Plan of Care  Goal: Optimal Comfort and Wellbeing  Outcome: Ongoing, Progressing     POC reviewed with pt. Answered all questions. A&Ox4. Denies pain. Tele in place. Bed in lowest position, call light within reach, non skid socks on, bed alarm refused, instructed to call staff for needs.

## 2022-05-19 NOTE — ASSESSMENT & PLAN NOTE
61 year old female PMH hypertension, pacemaker, mechanical AoVR, thoracic aortic aneurysm repair - dacron graft- 2007, who [place] [facility] [place] [facility] ED for streptococcal bacteremia.     Pt with no leukocytosis, labs unremarkable thus far. Afebrile. Initial blood cultures from 5/16/22 are + for strep oralis in 4/4 bottles, sensitive to ceftriaxone. Repeat cultures NGTD. 2D echo from 5/16/22 significant for ascending aorta vascular plug and mild aorta dilation, no vegetations seen. CELSO pending. Pt denies recent illness, drug use, or dental work. Did state she frequently uses metal pick for dental plaque which causes gum bleeding.      ID was consulted for bacteremia complicated by pacemaker presence. Pt is currently on vancomycin.     Recommendations:  - Deescalate to ceftriaxone 2g IV q12 hours for potential endocarditis.   - Follow CTS for surgical recommendations   - CELSO planned for monday, follow for results   - Pt seen and reviewed with ID staff, Dr. Moore. ID will follow.

## 2022-05-19 NOTE — ASSESSMENT & PLAN NOTE
On warfarin 5mg daily   - check daily INR  - given that patient may require diagnostic or other invasive procedures this admit - switch to therapeutic lovenox 1mg/kg [id number]

## 2022-05-19 NOTE — SUBJECTIVE & OBJECTIVE
Past Medical History:   Diagnosis Date    Heart disease     Hypertension     Pacemaker 11/25/2013    S/P Mechanical AVR 11/25/2013    S/P thoracic aortic aneurysm repair with Dacron graft (2007) 11/25/2013    S/P VSD surgical patch repair -1997 11/25/2013       Past Surgical History:   Procedure Laterality Date    ASCENDING AORTIC ANEURYSM REPAIR      BREAST SURGERY      CARDIAC CATHETERIZATION      CARDIAC PACEMAKER PLACEMENT      HYSTERECTOMY      MECHANICAL AORTIC VALVE REPLACEMENT         Review of patient's allergies indicates:   Allergen Reactions    Iodine Anaphylaxis     contrast    Lidocaine Anaphylaxis     cetacaine spray       Facility-Administered Medications Prior to Admission   Medication    [DISCONTINUED] dexamethasone injection 2 mg     PTA Medications   Medication Sig    ALBUTEROL SULFATE (VENTOLIN HFA INHL) Inhale 1 puff into the lungs as needed.    alprazolam (XANAX) 0.25 MG tablet Take 0.25 mg by mouth 2 (two) times daily as needed for Anxiety.    loratadine (CLARITIN) 10 mg tablet TAKE ONE TABLET BY MOUTH DAILY AS NEEDED FOR ALLERGIES    metoprolol succinate (TOPROL-XL) 50 MG 24 hr tablet Take 1 tablet every Morning and take 2 tablets in the evening    potassium chloride (MICRO-K) 10 MEQ CpSR Take 10 mEq by mouth 2 (two) times daily.     prednisoLONE acetate (PRED FORTE) 1 % DrpS Place 1 drop into the right eye 4 (four) times daily.    sotalol (BETAPACE) 80 MG tablet 80 mg 2 (two) times daily.     valsartan (DIOVAN) 320 MG tablet Take 160 mg by mouth every 12 (twelve) hours. 1/2 in the morning 1/2 at night    WARFARIN SODIUM (COUMADIN ORAL) Take 5 mg by mouth once daily. Pt is taking 5 mg daily except on Saturday and Monday pt takes 2.5mg    zolpidem (AMBIEN) 10 mg Tab Take 10 mg by mouth nightly as needed.     Family History       Problem Relation (Age of Onset)    Arrhythmia Mother    Heart disease Mother, Father    Heart failure Mother    Hypertension Mother, Father          Tobacco Use     Smoking status: Former Smoker     Quit date: 2009     Years since quittin.5    Smokeless tobacco: Never Used   Substance and Sexual Activity    Alcohol use: No    Drug use: No    Sexual activity: Not on file     Review of Systems   Constitutional: Positive for fever, malaise/fatigue and night sweats.   HENT: Negative.     Eyes: Negative.    Cardiovascular:  Positive for dyspnea on exertion. Negative for chest pain, leg swelling, orthopnea, palpitations and syncope.   Respiratory:  Positive for shortness of breath. Negative for cough.    Endocrine: Negative.    Hematologic/Lymphatic: Negative.    Skin: Negative.    Musculoskeletal: Negative.    Gastrointestinal: Negative.    Genitourinary: Negative.    Neurological: Negative.    Psychiatric/Behavioral: Negative.     Allergic/Immunologic: Negative.    Objective:     Vital Signs (Most Recent):  Temp: 97.6 °F (36.4 °C) (22)  Pulse: 65 (22)  Resp: 16 (22)  BP: (!) 143/58 (22)  SpO2: 97 % (22)   Vital Signs (24h Range):  Temp:  [97.6 °F (36.4 °C)-98.7 °F (37.1 °C)] 97.6 °F (36.4 °C)  Pulse:  [64-74] 65  Resp:  [16-19] 16  SpO2:  [97 %-100 %] 97 %  BP: ()/(50-68) 143/58     Weight: 58.5 kg (128 lb 15.5 oz)  Body mass index is 22.85 kg/m².    SpO2: 97 %  O2 Device (Oxygen Therapy): room air      Intake/Output Summary (Last 24 hours) at 2022 0742  Last data filed at 2022 0116  Gross per 24 hour   Intake 643.06 ml   Output --   Net 643.06 ml       Lines/Drains/Airways       Peripheral Intravenous Line  Duration                  Peripheral IV - Single Lumen 22 1000 20 G Anterior;Right Forearm <1 day                    Physical Exam  Constitutional:       General: She is not in acute distress.     Appearance: She is not ill-appearing.      Comments: Pleasant middle age female   HENT:      Head: Normocephalic.      Mouth/Throat:      Mouth: Mucous membranes are moist.   Eyes:      Extraocular  Movements: Extraocular movements intact.   Cardiovascular:      Rate and Rhythm: Normal rate and regular rhythm.      Pulses:           Radial pulses are 2+ on the right side and 2+ on the left side.        Femoral pulses are 2+ on the right side and 2+ on the left side.     Heart sounds: Murmur heard.   Blowing holosystolic murmur is present at the upper right sternal border and upper left sternal border.   Pulmonary:      Effort: Pulmonary effort is normal. No respiratory distress.      Breath sounds: Normal breath sounds.   Abdominal:      General: Abdomen is flat. Bowel sounds are normal. There is no distension.      Palpations: Abdomen is soft.      Tenderness: There is no abdominal tenderness.   Musculoskeletal:      Cervical back: Neck supple.      Right lower leg: No edema.      Left lower leg: No edema.   Skin:     General: Skin is warm.   Neurological:      General: No focal deficit present.      Mental Status: She is alert and oriented to person, place, and time.   Psychiatric:         Mood and Affect: Mood normal.         Behavior: Behavior normal.       Significant Labs: Blood Culture:   Recent Labs   Lab 05/18/22  2310 05/18/22  2320   LABBLOO No Growth to date No Growth to date   , BMP:   Recent Labs   Lab 05/19/22  0538   GLU 70      K 3.9      CO2 24   BUN 11   CREATININE 0.7   CALCIUM 8.8   , CMP   Recent Labs   Lab 05/19/22  0538      K 3.9      CO2 24   GLU 70   BUN 11   CREATININE 0.7   CALCIUM 8.8   ANIONGAP 7*   ESTGFRAFRICA >60.0   EGFRNONAA >60.0   , CBC No results for input(s): WBC, HGB, HCT, PLT in the last 48 hours., INR   Recent Labs   Lab 05/19/22  0538   INR 2.1*   , Lipid Panel No results for input(s): CHOL, HDL, LDLCALC, TRIG, CHOLHDL in the last 48 hours., and Troponin No results for input(s): TROPONINI in the last 48 hours.    Significant Imaging:   TTE 5/16/2022  The left ventricle is normal in size with mild eccentric hypertrophy and normal systolic  function.  Normal left ventricular diastolic function.  Normal right ventricular size with normal right ventricular systolic function.  There is a mechanical aortic valve present. There is trivial central aortic insufficiency present. Aortic valve area is 1.38 cm2; peak velocity is 3.38 m/s; mean gradient is 21 mmHg. The dimensionless index is 0.33.  Aortic valve area is 1.38 cm2; peak velocity is 3.38 m/s; mean gradient is 21 mmHg. The dimensionless index is 0.33.  A vascular plug is noted in the ascending aorta in a posterolateral location.  The estimated ejection fraction is 60%.  The ascending aorta is mildly dilated measuring 4.1 cm.  The aortic root at the sinuses of Valsalva is mildly dilated measuring 3.7 cm  Intermediate central venous pressure (8 mmHg).  The estimated PA systolic pressure is 35 mmHg.

## 2022-05-19 NOTE — HPI
TRANSFER CENTER PHYSICIAN SUMMARY  Patient is a 61 y.o. female who has a past medical history of Hypertension, s/p Pacemaker, S/P Mechanical AoVR, S/P thoracic aortic aneurysm repair with Dacron graft (2007), and S/P VSD surgical patch repair 1997 presented with to [facility] ED for abnormal lab results. Patient had one year follow up with interventional cardiology yesterday where she complained of shortness of breath and fevers. She had labs drawn including blood cultures. 5/17 blood cultures returned positive for GPC in chains. Patient advised to go to local ED for further management. On arrival to ED work up unrevealing. Blood cultures have been repeated. She was started on IV Vancomycin. Vitals stable. Facility seeking transfer for ID and cardiology evaluation. Stable for transfer.      BEDSIDE EVAL  Seen in cardiology clinic on 5/16 - had c/o of fevers, fatigue, malaise and blood cultures were sent, ECHO completed and patient notified on 5/17 of positive blood culture results, she was advised to seek care,  ID staff Dr. Frieda Gutierrez requested patient [place] [facility] [place] [facility] ED.     Patient reports that 6-8 weeks ago she had a bad sinus infection that transitioned to itchy/watery eyes and had a persistent non-productive cough, overall hasn't felt well since this time, her fevers have been sporadic with progressively worsening fatigue prior to her clinic appointment 5/16.  She has been adherent to home medications including her anti-hypertensives.      She notes gingival bleeding when brushing her teeth, unsure of her last INR, chronically on Coumadin.  She had Right Eye Cataract surgery on 05/06/22, supposed to have Left Eye completed this week.  No dental procedures, denies any skin changes or rash.   She has hx of Mechanical aortic valve, Pacemaker placement (98% paced) and has Left knee arthroplasty

## 2022-05-19 NOTE — SUBJECTIVE & OBJECTIVE
Past Medical History:   Diagnosis Date    Heart disease     Hypertension     Pacemaker 11/25/2013    S/P Mechanical AVR 11/25/2013    S/P thoracic aortic aneurysm repair with Dacron graft (2007) 11/25/2013    S/P VSD surgical patch repair -1997 11/25/2013       Past Surgical History:   Procedure Laterality Date    ASCENDING AORTIC ANEURYSM REPAIR      BREAST SURGERY      CARDIAC CATHETERIZATION      CARDIAC PACEMAKER PLACEMENT      HYSTERECTOMY      MECHANICAL AORTIC VALVE REPLACEMENT         Review of patient's allergies indicates:   Allergen Reactions    Iodine Anaphylaxis     contrast    Lidocaine Anaphylaxis     cetacaine spray       No current facility-administered medications on file prior to encounter.     Current Outpatient Medications on File Prior to Encounter   Medication Sig    ALBUTEROL SULFATE (VENTOLIN HFA INHL) Inhale 1 puff into the lungs as needed.    alprazolam (XANAX) 0.25 MG tablet Take 0.25 mg by mouth 2 (two) times daily as needed for Anxiety.    loratadine (CLARITIN) 10 mg tablet TAKE ONE TABLET BY MOUTH DAILY AS NEEDED FOR ALLERGIES    metoprolol succinate (TOPROL-XL) 50 MG 24 hr tablet Take 1 tablet every Morning and take 2 tablets in the evening    potassium chloride (MICRO-K) 10 MEQ CpSR Take 10 mEq by mouth 2 (two) times daily.     prednisoLONE acetate (PRED FORTE) 1 % DrpS Place 1 drop into the right eye 4 (four) times daily.    sotalol (BETAPACE) 80 MG tablet 80 mg 2 (two) times daily.     valsartan (DIOVAN) 320 MG tablet Take 160 mg by mouth every 12 (twelve) hours. 1/2 in the morning 1/2 at night    WARFARIN SODIUM (COUMADIN ORAL) Take 5 mg by mouth once daily. Pt is taking 5 mg daily except on Saturday and Monday pt takes 2.5mg    zolpidem (AMBIEN) 10 mg Tab Take 10 mg by mouth nightly as needed.     Family History       Problem Relation (Age of Onset)    Arrhythmia Mother    Heart disease Mother, Father    Heart failure Mother    Hypertension Mother, Father          Tobacco Use     Smoking status: Former Smoker     Quit date: 2009     Years since quittin.5    Smokeless tobacco: Never Used   Substance and Sexual Activity    Alcohol use: No    Drug use: No    Sexual activity: Not on file     Review of Systems   Constitutional: Positive for fever. Negative for malaise/fatigue.   Eyes:  Negative for blurred vision and pain.   Cardiovascular:  Negative for chest pain, dyspnea on exertion, leg swelling, orthopnea, palpitations and paroxysmal nocturnal dyspnea.   Respiratory:  Negative for cough, shortness of breath, sputum production and wheezing.    Hematologic/Lymphatic: Negative for adenopathy and bleeding problem.   Skin:  Negative for rash.   Musculoskeletal:  Negative for back pain and neck pain.   Gastrointestinal:  Negative for abdominal pain, constipation, diarrhea, nausea and vomiting.   Genitourinary:  Negative for dysuria.   Neurological:  Negative for dizziness, headaches, light-headedness and weakness.   Objective:     Vital Signs (Most Recent):  Temp: 97.6 °F (36.4 °C) (22 0427)  Pulse: 65 (22 075)  Resp: 16 (22)  BP: 116/78 (22 075)  SpO2: 96 % (22) Vital Signs (24h Range):  Temp:  [97.6 °F (36.4 °C)-98.7 °F (37.1 °C)] 97.6 °F (36.4 °C)  Pulse:  [64-74] 65  Resp:  [16-19] 16  SpO2:  [96 %-100 %] 96 %  BP: ()/(50-78) 116/78       Weight: 58.5 kg (128 lb 15.5 oz)  Body mass index is 22.85 kg/m².    SpO2: 96 %  O2 Device (Oxygen Therapy): room air    Physical Exam  Constitutional:       General: She is not in acute distress.     Appearance: Normal appearance. She is not ill-appearing, toxic-appearing or diaphoretic.   HENT:      Head: Normocephalic and atraumatic.      Nose: Nose normal.   Eyes:      Extraocular Movements: Extraocular movements intact.      Pupils: Pupils are equal, round, and reactive to light.   Cardiovascular:      Rate and Rhythm: Normal rate and regular rhythm.      Heart sounds: Murmur heard.    Holosystolic murmur is present.     No friction rub. No gallop.   Pulmonary:      Effort: Pulmonary effort is normal. No respiratory distress.      Breath sounds: Normal breath sounds. No wheezing or rales.   Abdominal:      General: Abdomen is flat. There is no distension.      Palpations: Abdomen is soft. There is no mass.      Tenderness: There is no abdominal tenderness.   Musculoskeletal:         General: No swelling. Normal range of motion.      Cervical back: Normal range of motion. No rigidity.      Right lower leg: No edema.      Left lower leg: No edema.   Skin:     General: Skin is warm and dry.      Coloration: Skin is not jaundiced.      Findings: No bruising.   Neurological:      General: No focal deficit present.      Mental Status: She is alert and oriented to person, place, and time.      Cranial Nerves: No cranial nerve deficit.      Motor: No weakness.       Significant Labs: BMP:   Recent Labs   Lab 05/19/22  0538   GLU 70      K 3.9      CO2 24   BUN 11   CREATININE 0.7   CALCIUM 8.8    and CBC:   Recent Labs   Lab 05/19/22  0538   WBC 5.59   HGB 11.2*   HCT 35.2*        Significant Imaging: Reviewed

## 2022-05-19 NOTE — CONSULTS
[facility] - Telemetry Stepdown  CTS  Consult Note    Inpatient consult to Cardiothoracic Surgery  Consult performed by: Concha Rios MD  Consult ordered by: Suman Hernadez MD        Subjective:     History of Present Illness:   Naina Keyes is a 61 y.o. female with h/o VSD repair in 1997, AVR and ascending aneurysm repair in 2007 (reportedly under circulatory arrest), and pacemaker placement who presented to the hospital following positive blood cultures. Patient reports increasing fatigue and occasional fevers for the past two months following a sinus infection. She was seen in Cardiology clinic and blood cultures were drawn which were positive for streptococcus. She was started on vancomycin. Repeat cultures are so far, NGTD. Labs show no leukocytosis. Echo on 5/3/22 showed EF 60%, no evidence of vegetations. CT chest showed significant calcifications in innominate artery and aortic arch.    No current facility-administered medications on file prior to encounter.     Current Outpatient Medications on File Prior to Encounter   Medication Sig    acetaminophen (TYLENOL) 500 MG tablet Take 1,000 mg by mouth daily as needed (pain/fever).    ALBUTEROL SULFATE (VENTOLIN HFA INHL) Inhale 1 puff into the lungs as needed (wheezing/shortness of breath).    alprazolam (XANAX) 0.25 MG tablet Take 0.25 mg by mouth 2 (two) times daily as needed for Anxiety.    loratadine (CLARITIN) 10 mg tablet TAKE ONE TABLET BY MOUTH DAILY AS NEEDED FOR ALLERGIES    metoprolol succinate (TOPROL-XL) 50 MG 24 hr tablet Take 1 tablet every Morning and take 2 tablets in the evening    potassium chloride (MICRO-K) 10 MEQ CpSR Take 10 mEq by mouth 2 (two) times daily.     prednisoLONE acetate (PRED FORTE) 1 % DrpS Place 1 drop into the right eye 4 (four) times daily.    sotalol (BETAPACE) 80 MG tablet Take 80 mg by mouth 2 (two) times daily.    valsartan (DIOVAN) 320 MG tablet Take 160 mg by mouth every 12 (twelve) hours. 1/2 in the  morning 1/2 at night    WARFARIN SODIUM (COUMADIN ORAL) Take 5 mg by mouth once daily except on Thursday    zolpidem (AMBIEN) 10 mg Tab Take 10 mg by mouth every evening.       Review of patient's allergies indicates:   Allergen Reactions    Iodine Anaphylaxis     contrast    Lidocaine Anaphylaxis     cetacaine spray       Past Medical History:   Diagnosis Date    Heart disease     Hypertension     Pacemaker 2013    S/P Mechanical AVR 2013    S/P thoracic aortic aneurysm repair with Dacron graft () 2013    S/P VSD surgical patch repair -2013     Past Surgical History:   Procedure Laterality Date    ASCENDING AORTIC ANEURYSM REPAIR      BREAST SURGERY      CARDIAC CATHETERIZATION      CARDIAC PACEMAKER PLACEMENT      HYSTERECTOMY      MECHANICAL AORTIC VALVE REPLACEMENT       Family History       Problem Relation (Age of Onset)    Arrhythmia Mother    Heart disease Mother, Father    Heart failure Mother    Hypertension Mother, Father          Tobacco Use    Smoking status: Former Smoker     Quit date: 2009     Years since quittin.5    Smokeless tobacco: Never Used   Substance and Sexual Activity    Alcohol use: No    Drug use: No    Sexual activity: Not on file     Review of Systems   Constitutional: Positive for fatigue and fever. Negative for activity change and chills.   Respiratory: Negative for cough and shortness of breath.    Cardiovascular: Negative for chest pain and palpitations.   Gastrointestinal: Negative for abdominal distention, abdominal pain, constipation, diarrhea, nausea and vomiting.   Musculoskeletal: Negative for arthralgias and myalgias.   Neurological: Negative for dizziness and headaches.   Psychiatric/Behavioral: Negative for agitation and confusion.     Objective:     Vital Signs (Most Recent):  Temp: 97.8 °F (36.6 °C) (22 1128)  Pulse: 70 (22 1128)  Resp: 18 (22 112)  BP: 117/75 (22 1128)  SpO2: 100 % (22)  Vital Signs (24h Range):  Temp:  [97.6 °F (36.4 °C)-98.7 °F (37.1 °C)] 97.8 °F (36.6 °C)  Pulse:  [64-74] 70  Resp:  [16-18] 18  SpO2:  [96 %-100 %] 100 %  BP: ()/(50-78) 117/75     Weight: 58.5 kg (128 lb 15.5 oz)  Body mass index is 22.85 kg/m².      Intake/Output Summary (Last 24 hours) at 5/19/2022 1151  Last data filed at 5/19/2022 0116  Gross per 24 hour   Intake 643.06 ml   Output --   Net 643.06 ml       Physical Exam  Constitutional:       General: She is not in acute distress.     Appearance: She is well-developed.   Cardiovascular:      Rate and Rhythm: Normal rate and regular rhythm.   Pulmonary:      Effort: Pulmonary effort is normal. No respiratory distress.   Abdominal:      General: There is no distension.      Palpations: Abdomen is soft.   Skin:     General: Skin is warm and dry.   Neurological:      Mental Status: She is alert and oriented to person, place, and time.   Psychiatric:         Behavior: Behavior normal.         Significant Labs:  CBC:   Recent Labs   Lab 05/19/22  0833   WBC 5.54   RBC 3.97*   HGB 12.1   HCT 37.9      MCV 96   MCH 30.5   MCHC 31.9*     CMP:   Recent Labs   Lab 05/19/22  0833   GLU 91   CALCIUM 9.1      K 4.1   CO2 23      BUN 11   CREATININE 0.8       Significant Diagnostics:  I have reviewed all pertinent imaging results/findings within the past 24 hours.    Assessment/Plan:     Patient with h/o VSD and AVR/ascending replacement now with concerns for aortic valve endocarditis.     Given high risk of third time redo operation, would recommend attempting to treat bacteremia with antibiotics. Will follow up repeat echo and reassess if there are any concerning features.     Dr. Delatorre to review and provide further recommendations.     Concha Rios MD, PGY-6  Cardiothoracic Surgery   [phone]    CTS Attending Note:    I have personally taken the history and examined this patient and agree with the resident's note as stated above.  Very  pleasant 61-year-old woman with previous history of VSD closure as well as aortic root replacement.  She was recently bacteremic.  She is now afebrile, and the most recent cultures are negative.  Her white count is normal, and she reports that she feels much better.  Reoperation for her would be extremely high risk given the proximity of her ascending aortic graft to the sternum.  She is scheduled for an echo on Monday.  Recommend continuing intravenous antibiotics.  Will await echo results, but likely long-term antibiotics will be the best option for her.

## 2022-05-19 NOTE — PLAN OF CARE
[facility] - Telemetry Stepdown  Initial Discharge Assessment       Primary Care Provider: Anjana Torres NP    Admission Diagnosis: Bacteremia [R78.81]    Admission Date: 5/18/2022  Expected Discharge Date: 5/24/2022    Discharge Barriers Identified: None    Payor: [employer] MANAGED MEDICARE / Plan: [employer] SNP (SPECIAL NEEDS PLAN) / Product Type: Medicare Advantage /     Extended Emergency Contact Information  Primary Emergency Contact: Lorena Wilson  Mobile Phone: [phone]  Relation: Daughter  Secondary Emergency Contact: Isa Delgado  Mobile Phone: [phone]  Relation: Sister    Discharge Plan A: Home with family  Discharge Plan B: Home      [facility] [place], [place] - [address] [place]  [address] [place] [address]  Phone: [phone] Fax: [phone]      Initial Assessment (most recent)     Adult Discharge Assessment - 05/19/22 1251        Discharge Assessment    Assessment Type Discharge Planning Assessment     Confirmed/corrected address, phone number and insurance Yes     Confirmed Demographics Correct on Facesheet     Source of Information patient     Reason For Admission Streptococcal bacteremia     Do you expect to return to your current living situation? Yes     Do you have help at home or someone to help you manage your care at home? --   Patient reports she is fully independent with all ADLs. Does have family support if needed.    Current cognitive status: Alert/Oriented     Walking or Climbing Stairs Difficulty none     Dressing/Bathing Difficulty none     Equipment Currently Used at Home none     Readmission within 30 days? No     Patient currently being followed by outpatient case management? No     Do you currently have service(s) that help you manage your care at home? No     Do you take prescription medications? Yes     Do you have prescription coverage? Yes     Do you have any problems affording any of your prescribed medications? No     Is the patient taking  medications as prescribed? yes     Who is going to help you get home at discharge? Family member     How do you get to doctors appointments? car, drives self;family or friend will provide     Are you on dialysis? No     Do you take coumadin? Yes     Who monitors your labs? Anjana Torres NP (PCP office). Also normally has labs drawn by the PCP's office.     Discharge Plan A Home with family     Discharge Plan B Home     DME Needed Upon Discharge  none     Discharge Plan discussed with: Patient     Discharge Barriers Identified None               Anai Auguste LMSW  [facility]  Ext. [phone]

## 2022-05-19 NOTE — PROGRESS NOTES
"Pharmacokinetic Initial Assessment: IV Vancomycin    Assessment/Plan:    Hx from OSH:  - Ms. Keyes was started on vancomycin at OSH. She received vanc 1250 mg (21 mg/kg) on 5/17 at 1715.  - She received a vanc "trough" on 5/18 at 1141 after one dose of vanc, and it resulted at 8.8 ~17 hours after previous dose.   - She then received another dose of vanc 1250 mg on 5/18 at 1225.     Current Plan:  - Given level, I suspect she will not be therapeutic >15 on 1250 mg Q24H regimen, which is 21 mg/kg/dose.   - Her renal function is stable and at baseline, and it is good. Her CrCl looks lower than it likely is given that her body weight is so low.   - Will schedule a maintenance dose of vancomycin 750 mg IV every 12 hours.  - Desired empiric serum trough concentration is 15 to 20 mcg/mL.  - Draw vancomycin trough level 60 min prior to fourth dose on 5/20 at approximately 1100.   - Please draw random level sooner than scheduled trough if renal function changes significantly.    Pharmacy will continue to follow and monitor vancomycin.      Please contact pharmacy with any questions regarding this assessment.     Thank you for the consult,   Nirali Hirsch       Patient brief summary:  Naina Keyes is a 61 y.o. female initiated on antimicrobial therapy with IV Vancomycin for treatment of suspected bacteremia (Strep mitis/oralis in 4/4 bottles from [facility] on 5/16)    Actual Body Weight:   58.5 kg    Renal Function:   Estimated Creatinine Clearance: 52.9 mL/min (based on SCr of 0.9 mg/dL).    Dialysis Method (if applicable):  N/A  "
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
## 2022-05-19 NOTE — CONSULTS
[facility] - Telemetry Stepdown  Interventional Cardiology  Consult Note    Patient Name: Naina Keyes  MRN: [id number]  Admission Date: 5/18/2022  Hospital Length of Stay: 1 days  Code Status: Full Code   Attending Provider: Jaz Vicente MD  Consulting Provider: Suman Hernadez MD  Primary Care Physician: Anjana Torres NP  Principal Problem:Streptococcal bacteremia    Patient information was obtained from patient and ER records.     Inpatient consult to Interventional Cardiology  Consult performed by: Suman Hernadez MD  Consult ordered by: Mesfin Warren MD        Subjective:     Chief Complaint:  Fatigue, SOB, Fever     HPI:  Patient is a 61 y.o. female who has a past medical history of Hypertension, s/p Pacemaker, S/P Mechanical AoVR, S/P thoracic aortic aneurysm repair with Dacron graft (2007), and S/P VSD surgical patch repair 1997 presented with to [facility] ED for abnormal lab results. Patient had one year follow up with interventional cardiology yesterday where she complained of shortness of breath and fevers. She had labs drawn including blood cultures. 5/17 blood cultures returned positive for GPC in chains. Patient advised to go to local ED for further management. On arrival to ED work up unrevealing. Blood cultures have been repeated. She was started on IV Vancomycin. Vitals stable. Facility seeking transfer for ID and cardiology evaluation. Stable for transfer.       Interventional cardiology consulted for bacteremia and valvular heart disease evaluation.      Past Medical History:   Diagnosis Date    Heart disease     Hypertension     Pacemaker 11/25/2013    S/P Mechanical AVR 11/25/2013    S/P thoracic aortic aneurysm repair with Dacron graft (2007) 11/25/2013    S/P VSD surgical patch repair -1997 11/25/2013       Past Surgical History:   Procedure Laterality Date    ASCENDING AORTIC ANEURYSM REPAIR      BREAST SURGERY      CARDIAC CATHETERIZATION      CARDIAC  PACEMAKER PLACEMENT      HYSTERECTOMY      MECHANICAL AORTIC VALVE REPLACEMENT         Review of patient's allergies indicates:   Allergen Reactions    Iodine Anaphylaxis     contrast    Lidocaine Anaphylaxis     cetacaine spray       Facility-Administered Medications Prior to Admission   Medication    [DISCONTINUED] dexamethasone injection 2 mg     PTA Medications   Medication Sig    ALBUTEROL SULFATE (VENTOLIN HFA INHL) Inhale 1 puff into the lungs as needed.    alprazolam (XANAX) 0.25 MG tablet Take 0.25 mg by mouth 2 (two) times daily as needed for Anxiety.    loratadine (CLARITIN) 10 mg tablet TAKE ONE TABLET BY MOUTH DAILY AS NEEDED FOR ALLERGIES    metoprolol succinate (TOPROL-XL) 50 MG 24 hr tablet Take 1 tablet every Morning and take 2 tablets in the evening    potassium chloride (MICRO-K) 10 MEQ CpSR Take 10 mEq by mouth 2 (two) times daily.     prednisoLONE acetate (PRED FORTE) 1 % DrpS Place 1 drop into the right eye 4 (four) times daily.    sotalol (BETAPACE) 80 MG tablet 80 mg 2 (two) times daily.     valsartan (DIOVAN) 320 MG tablet Take 160 mg by mouth every 12 (twelve) hours. 1/2 in the morning 1/2 at night    WARFARIN SODIUM (COUMADIN ORAL) Take 5 mg by mouth once daily. Pt is taking 5 mg daily except on Saturday and Monday pt takes 2.5mg    zolpidem (AMBIEN) 10 mg Tab Take 10 mg by mouth nightly as needed.     Family History       Problem Relation (Age of Onset)    Arrhythmia Mother    Heart disease Mother, Father    Heart failure Mother    Hypertension Mother, Father          Tobacco Use    Smoking status: Former Smoker     Quit date: 2009     Years since quittin.5    Smokeless tobacco: Never Used   Substance and Sexual Activity    Alcohol use: No    Drug use: No    Sexual activity: Not on file     Review of Systems   Constitutional: Positive for fever, malaise/fatigue and night sweats.   HENT: Negative.     Eyes: Negative.    Cardiovascular:  Positive for dyspnea  on exertion. Negative for chest pain, leg swelling, orthopnea, palpitations and syncope.   Respiratory:  Positive for shortness of breath. Negative for cough.    Endocrine: Negative.    Hematologic/Lymphatic: Negative.    Skin: Negative.    Musculoskeletal: Negative.    Gastrointestinal: Negative.    Genitourinary: Negative.    Neurological: Negative.    Psychiatric/Behavioral: Negative.     Allergic/Immunologic: Negative.    Objective:     Vital Signs (Most Recent):  Temp: 97.6 °F (36.4 °C) (05/19/22 0427)  Pulse: 65 (05/19/22 0427)  Resp: 16 (05/19/22 0427)  BP: (!) 143/58 (05/19/22 0427)  SpO2: 97 % (05/19/22 0427)   Vital Signs (24h Range):  Temp:  [97.6 °F (36.4 °C)-98.7 °F (37.1 °C)] 97.6 °F (36.4 °C)  Pulse:  [64-74] 65  Resp:  [16-19] 16  SpO2:  [97 %-100 %] 97 %  BP: ()/(50-68) 143/58     Weight: 58.5 kg (128 lb 15.5 oz)  Body mass index is 22.85 kg/m².    SpO2: 97 %  O2 Device (Oxygen Therapy): room air      Intake/Output Summary (Last 24 hours) at 5/19/2022 0742  Last data filed at 5/19/2022 0116  Gross per 24 hour   Intake 643.06 ml   Output --   Net 643.06 ml       Lines/Drains/Airways       Peripheral Intravenous Line  Duration                  Peripheral IV - Single Lumen 05/18/22 1000 20 G Anterior;Right Forearm <1 day                    Physical Exam  Constitutional:       General: She is not in acute distress.     Appearance: She is not ill-appearing.      Comments: Pleasant middle age female   HENT:      Head: Normocephalic.      Mouth/Throat:      Mouth: Mucous membranes are moist.   Eyes:      Extraocular Movements: Extraocular movements intact.   Cardiovascular:      Rate and Rhythm: Normal rate and regular rhythm.      Pulses:           Radial pulses are 2+ on the right side and 2+ on the left side.        Femoral pulses are 2+ on the right side and 2+ on the left side.     Heart sounds: Murmur heard.   Blowing holosystolic murmur is present at the upper right sternal border and upper  left sternal border.   Pulmonary:      Effort: Pulmonary effort is normal. No respiratory distress.      Breath sounds: Normal breath sounds.   Abdominal:      General: Abdomen is flat. Bowel sounds are normal. There is no distension.      Palpations: Abdomen is soft.      Tenderness: There is no abdominal tenderness.   Musculoskeletal:      Cervical back: Neck supple.      Right lower leg: No edema.      Left lower leg: No edema.   Skin:     General: Skin is warm.   Neurological:      General: No focal deficit present.      Mental Status: She is alert and oriented to person, place, and time.   Psychiatric:         Mood and Affect: Mood normal.         Behavior: Behavior normal.       Significant Labs: Blood Culture:   Recent Labs   Lab 05/18/22  2310 05/18/22  2320   LABBLOO No Growth to date No Growth to date   , BMP:   Recent Labs   Lab 05/19/22  0538   GLU 70      K 3.9      CO2 24   BUN 11   CREATININE 0.7   CALCIUM 8.8   , CMP   Recent Labs   Lab 05/19/22  0538      K 3.9      CO2 24   GLU 70   BUN 11   CREATININE 0.7   CALCIUM 8.8   ANIONGAP 7*   ESTGFRAFRICA >60.0   EGFRNONAA >60.0   , CBC No results for input(s): WBC, HGB, HCT, PLT in the last 48 hours., INR   Recent Labs   Lab 05/19/22  0538   INR 2.1*   , Lipid Panel No results for input(s): CHOL, HDL, LDLCALC, TRIG, CHOLHDL in the last 48 hours., and Troponin No results for input(s): TROPONINI in the last 48 hours.    Significant Imaging:   TTE 5/16/2022  · The left ventricle is normal in size with mild eccentric hypertrophy and normal systolic function.  · Normal left ventricular diastolic function.  · Normal right ventricular size with normal right ventricular systolic function.  · There is a mechanical aortic valve present. There is trivial central aortic insufficiency present. Aortic valve area is 1.38 cm2; peak velocity is 3.38 m/s; mean gradient is 21 mmHg. The dimensionless index is 0.33.  · Aortic valve area is 1.38 cm2;  peak velocity is 3.38 m/s; mean gradient is 21 mmHg. The dimensionless index is 0.33.  · A vascular plug is noted in the ascending aorta in a posterolateral location.  · The estimated ejection fraction is 60%.  · The ascending aorta is mildly dilated measuring 4.1 cm.  · The aortic root at the sinuses of Valsalva is mildly dilated measuring 3.7 cm  · Intermediate central venous pressure (8 mmHg).  · The estimated PA systolic pressure is 35 mmHg.      Assessment and Plan:     * Streptococcal bacteremia  BCx 5/16 growing streptococcus mitis. Sources likely oral vs valvular vs pacemaker. Hemodynamically stable and no clinical s/s heart failure. TTE with moderate AS, which is a progression from TTE last year.    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 INR goal 2-3 for mechanical AVR.    S/P thoracic aortic aneurysm repair with Dacron graft (2007)  Stable ascending thoracic aortic aneurysm measuring 4 cm on most recent CT chest.     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      VTE Risk Mitigation (From admission, onward)         Ordered     enoxaparin injection 60 mg  Every 12 hours (non-standard times)         05/18/22 2233     IP VTE LOW RISK PATIENT  Once         05/18/22 2129     Place sequential compression device  Until discontinued          05/18/22 2129              Case discussed with Dr Cloton Gallegos MD.     Thank you for your consult. I will sign off. Please contact us if you have any additional questions.    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  She has been deemed inoperable in the past but I would request you ask CT surgery to see her for possible redo AVR/Bentall.  If she cannot be treated surgically, then medical management is her only option.  There are no interventional options for prosthetic valve/graft endocarditis.

## 2022-05-19 NOTE — CONSULTS
[facility] - Telemetry Stepdown  Cardiac Electrophysiology  Consult Note    Admission Date: 5/18/2022  Code Status: Full Code   Attending Provider: Jaz Vicente MD  Consulting Provider: Julio Varela MD  Principal Problem:Streptococcal bacteremia    Inpatient consult to Electrophysiology  Consult performed by: Julio Varela MD  Consult ordered by: Mesfin Warren MD        Subjective:     Chief Complaint:  Bacteremia      HPI:   Patient is a 61 y.o. female who has a past medical history of Hypertension, Mechanical AoVR in 1997 with subsequent CHB requiring PPM, S/P thoracic aortic aneurysm repair with Dacron graft (2007), and S/P VSD surgical patch repair 1997 presented with to [facility] ED for abnormal lab results. Patient had one year follow up with interventional cardiology yesterday where she complained of shortness of breath and fevers. She had labs drawn including blood cultures. 5/17 blood cultures returned positive for GPC in chains. Patient advised to go to local ED for further management. Blood cultures have been repeated. She was started on IV Vancomycin. Vitals stable.      EP was consulted for bacteremia and evaluation for PPM extraction. PPM is [employer] with 2 leads placed in 2017, and PM replaced in Mar 2015. Pt denies any issues at this time.      5/16/22 TTE   · The left ventricle is normal in size with mild eccentric hypertrophy and normal systolic function.  · Normal left ventricular diastolic function.  · Normal right ventricular size with normal right ventricular systolic function.  · There is a mechanical aortic valve present. There is trivial central aortic insufficiency present. Aortic valve area is 1.38 cm2; peak velocity is 3.38 m/s; mean gradient is 21 mmHg. The dimensionless index is 0.33.  · Aortic valve area is 1.38 cm2; peak velocity is 3.38 m/s; mean gradient is 21 mmHg. The dimensionless index is 0.33.  · A vascular plug is noted in the ascending aorta  in a posterolateral location.  · The estimated ejection fraction is 60%.  · The ascending aorta is mildly dilated measuring 4.1 cm.  · The aortic root at the sinuses of Valsalva is mildly dilated measuring 3.7 cm  · Intermediate central venous pressure (8 mmHg).  · The estimated PA systolic pressure is 35 mmHg.      Past Medical History:   Diagnosis Date    Heart disease     Hypertension     Pacemaker 11/25/2013    S/P Mechanical AVR 11/25/2013    S/P thoracic aortic aneurysm repair with Dacron graft (2007) 11/25/2013    S/P VSD surgical patch repair -1997 11/25/2013       Past Surgical History:   Procedure Laterality Date    ASCENDING AORTIC ANEURYSM REPAIR      BREAST SURGERY      CARDIAC CATHETERIZATION      CARDIAC PACEMAKER PLACEMENT      HYSTERECTOMY      MECHANICAL AORTIC VALVE REPLACEMENT         Review of patient's allergies indicates:   Allergen Reactions    Iodine Anaphylaxis     contrast    Lidocaine Anaphylaxis     cetacaine spray       No current facility-administered medications on file prior to encounter.     Current Outpatient Medications on File Prior to Encounter   Medication Sig    ALBUTEROL SULFATE (VENTOLIN HFA INHL) Inhale 1 puff into the lungs as needed.    alprazolam (XANAX) 0.25 MG tablet Take 0.25 mg by mouth 2 (two) times daily as needed for Anxiety.    loratadine (CLARITIN) 10 mg tablet TAKE ONE TABLET BY MOUTH DAILY AS NEEDED FOR ALLERGIES    metoprolol succinate (TOPROL-XL) 50 MG 24 hr tablet Take 1 tablet every Morning and take 2 tablets in the evening    potassium chloride (MICRO-K) 10 MEQ CpSR Take 10 mEq by mouth 2 (two) times daily.     prednisoLONE acetate (PRED FORTE) 1 % DrpS Place 1 drop into the right eye 4 (four) times daily.    sotalol (BETAPACE) 80 MG tablet 80 mg 2 (two) times daily.     valsartan (DIOVAN) 320 MG tablet Take 160 mg by mouth every 12 (twelve) hours. 1/2 in the morning 1/2 at night    WARFARIN SODIUM (COUMADIN ORAL) Take 5 mg by mouth  once daily. Pt is taking 5 mg daily except on Saturday and Monday pt takes 2.5mg    zolpidem (AMBIEN) 10 mg Tab Take 10 mg by mouth nightly as needed.     Family History       Problem Relation (Age of Onset)    Arrhythmia Mother    Heart disease Mother, Father    Heart failure Mother    Hypertension Mother, Father          Tobacco Use    Smoking status: Former Smoker     Quit date: 2009     Years since quittin.5    Smokeless tobacco: Never Used   Substance and Sexual Activity    Alcohol use: No    Drug use: No    Sexual activity: Not on file     Review of Systems   Constitutional: Positive for fever. Negative for malaise/fatigue.   Eyes:  Negative for blurred vision and pain.   Cardiovascular:  Negative for chest pain, dyspnea on exertion, leg swelling, orthopnea, palpitations and paroxysmal nocturnal dyspnea.   Respiratory:  Negative for cough, shortness of breath, sputum production and wheezing.    Hematologic/Lymphatic: Negative for adenopathy and bleeding problem.   Skin:  Negative for rash.   Musculoskeletal:  Negative for back pain and neck pain.   Gastrointestinal:  Negative for abdominal pain, constipation, diarrhea, nausea and vomiting.   Genitourinary:  Negative for dysuria.   Neurological:  Negative for dizziness, headaches, light-headedness and weakness.   Objective:     Vital Signs (Most Recent):  Temp: 97.6 °F (36.4 °C) (22 0427)  Pulse: 65 (22 0757)  Resp: 16 (22)  BP: 116/78 (22 075)  SpO2: 96 % (22 075) Vital Signs (24h Range):  Temp:  [97.6 °F (36.4 °C)-98.7 °F (37.1 °C)] 97.6 °F (36.4 °C)  Pulse:  [64-74] 65  Resp:  [16-19] 16  SpO2:  [96 %-100 %] 96 %  BP: ()/(50-78) 116/78       Weight: 58.5 kg (128 lb 15.5 oz)  Body mass index is 22.85 kg/m².    SpO2: 96 %  O2 Device (Oxygen Therapy): room air    Physical Exam  Constitutional:       General: She is not in acute distress.     Appearance: Normal appearance. She is not ill-appearing,  toxic-appearing or diaphoretic.   HENT:      Head: Normocephalic and atraumatic.      Nose: Nose normal.   Eyes:      Extraocular Movements: Extraocular movements intact.      Pupils: Pupils are equal, round, and reactive to light.   Cardiovascular:      Rate and Rhythm: Normal rate and regular rhythm.      Heart sounds: Murmur heard.   Holosystolic murmur is present.     No friction rub. No gallop.   Pulmonary:      Effort: Pulmonary effort is normal. No respiratory distress.      Breath sounds: Normal breath sounds. No wheezing or rales.   Abdominal:      General: Abdomen is flat. There is no distension.      Palpations: Abdomen is soft. There is no mass.      Tenderness: There is no abdominal tenderness.   Musculoskeletal:         General: No swelling. Normal range of motion.      Cervical back: Normal range of motion. No rigidity.      Right lower leg: No edema.      Left lower leg: No edema.   Skin:     General: Skin is warm and dry.      Coloration: Skin is not jaundiced.      Findings: No bruising.   Neurological:      General: No focal deficit present.      Mental Status: She is alert and oriented to person, place, and time.      Cranial Nerves: No cranial nerve deficit.      Motor: No weakness.       Significant Labs: BMP:   Recent Labs   Lab 05/19/22  0538   GLU 70      K 3.9      CO2 24   BUN 11   CREATININE 0.7   CALCIUM 8.8    and CBC:   Recent Labs   Lab 05/19/22  0538   WBC 5.59   HGB 11.2*   HCT 35.2*        Significant Imaging: Reviewed               Assessment and Plan:     * Streptococcal bacteremia      Pacemaker  Pt reports CHB after AVR in 1997. PPM is [employer] with 2 leads placed in 2017, and PM replaced in Mar 2015.     -Will order formal device interrogation   -Will follow up CELSO results   -Agree with ID consult   -Will having ongoing evaluation for PPM extraction     Thank you for your consult. I will follow-up with patient. Please contact us if you have any  additional questions.    Julio Varela MD  Cardiac Electrophysiology  [facility] - Telemetry Stepdown

## 2022-05-19 NOTE — ASSESSMENT & PLAN NOTE
Patient reports chronically paced  -she would prefer not to have any explantation of devices during admission  -EP consult to review and discuss options with patient.

## 2022-05-19 NOTE — SUBJECTIVE & OBJECTIVE
Past Medical History:   Diagnosis Date    Heart disease     Hypertension     Pacemaker 11/25/2013    S/P Mechanical AVR 11/25/2013    S/P thoracic aortic aneurysm repair with Dacron graft (2007) 11/25/2013    S/P VSD surgical patch repair -1997 11/25/2013       Past Surgical History:   Procedure Laterality Date    ASCENDING AORTIC ANEURYSM REPAIR      BREAST SURGERY      CARDIAC CATHETERIZATION      CARDIAC PACEMAKER PLACEMENT      HYSTERECTOMY      MECHANICAL AORTIC VALVE REPLACEMENT         Review of patient's allergies indicates:   Allergen Reactions    Iodine Anaphylaxis     contrast    Lidocaine Anaphylaxis     cetacaine spray       No current facility-administered medications on file prior to encounter.     Current Outpatient Medications on File Prior to Encounter   Medication Sig    ALBUTEROL SULFATE (VENTOLIN HFA INHL) Inhale into the lungs as needed.    alprazolam (XANAX) 0.25 MG tablet Take 0.5 mg by mouth as needed.    EScitalopram oxalate (LEXAPRO) 10 MG tablet Take 10 mg by mouth once daily.    loratadine (CLARITIN) 10 mg tablet TAKE ONE TABLET BY MOUTH DAILY AS NEEDED FOR ALLERGIES    magnesium 200 mg Tab Take 500 mg by mouth once daily.     metoprolol succinate (TOPROL-XL) 100 MG 24 hr tablet Take 50 mg by mouth 2 (two) times daily. Pt  Take  1/2  Tab  In  Am  And 100mg  Pm    metoprolol tartrate (LOPRESSOR) 50 MG tablet Take 50 mg by mouth 2 (two) times daily.    ofloxacin (OCUFLOX) 0.3 % ophthalmic solution INSTILL ONE DROP IN THE RIGHT EYE FOUR TIMES DAILY    olopatadine (PATANASE) 0.6 % nasal spray 1 spray by Nasal route once daily.    potassium chloride (MICRO-K) 10 MEQ CpSR Take 10 mEq by mouth 2 (two) times daily.     prednisoLONE acetate (PRED FORTE) 1 % DrpS Place 1 drop into the right eye 4 (four) times daily.    predniSONE (DELTASONE) 10 MG tablet     sotalol (BETAPACE) 80 MG tablet 80 mg 2 (two) times daily.     valsartan (DIOVAN) 160 MG tablet Take 160 mg by mouth once daily.    valsartan  (DIOVAN) 320 MG tablet Take 320 mg by mouth every 12 (twelve) hours. 1/2 in the morning 1/2 at night    WARFARIN SODIUM (COUMADIN ORAL) Take 5 mg by mouth once daily. Pt is taking 5 mg daily except on Saturday and Monday pt takes 2.5mg    zolpidem (AMBIEN) 10 mg Tab Take 10 mg by mouth nightly as needed.     Family History       Problem Relation (Age of Onset)    Arrhythmia Mother    Heart disease Mother, Father    Heart failure Mother    Hypertension Mother, Father          Tobacco Use    Smoking status: Former Smoker     Quit date: 2009     Years since quittin.5    Smokeless tobacco: Never Used   Substance and Sexual Activity    Alcohol use: No    Drug use: No    Sexual activity: Not on file     Review of Systems   Constitutional:  Positive for chills, fatigue and fever.   HENT:  Negative for sore throat and trouble swallowing.    Eyes:  Positive for photophobia (right eye).   Respiratory:  Positive for cough. Negative for shortness of breath and wheezing.    Cardiovascular:  Negative for chest pain, palpitations and leg swelling.   Gastrointestinal:  Negative for nausea and vomiting.   Genitourinary:  Negative for dysuria.   Musculoskeletal:  Negative for arthralgias, back pain and gait problem.   Skin:  Negative for rash.   Neurological:  Negative for dizziness and headaches.   Psychiatric/Behavioral:  Negative for confusion.    Objective:     Vital Signs (Most Recent):  Temp: 97.7 °F (36.5 °C) (22)  Pulse: 74 (22)  Resp: 17 (22)  BP: 133/68 (22)  SpO2: 99 % (22) Vital Signs (24h Range):  Temp:  [97.7 °F (36.5 °C)-98.7 °F (37.1 °C)] 97.7 °F (36.5 °C)  Pulse:  [64-79] 74  Resp:  [17-19] 17  SpO2:  [95 %-100 %] 99 %  BP: ()/(50-68) 133/68        There is no height or weight on file to calculate BMI.    Physical Exam  Constitutional:       General: She is not in acute distress.     Appearance: She is not toxic-appearing.   HENT:      Head:  Normocephalic and atraumatic.      Mouth/Throat:      Pharynx: No oropharyngeal exudate.   Eyes:      Pupils: Pupils are equal, round, and reactive to light.   Cardiovascular:      Rate and Rhythm: Normal rate and regular rhythm.      Heart sounds: Murmur heard.      Comments: End-Systolic click  Pulmonary:      Effort: Pulmonary effort is normal. No respiratory distress.      Breath sounds: Normal breath sounds. No wheezing.   Abdominal:      General: Bowel sounds are normal. There is no distension.      Palpations: Abdomen is soft.      Tenderness: There is no abdominal tenderness. There is no guarding.   Musculoskeletal:         General: No swelling.      Right lower leg: No edema.      Left lower leg: No edema.      Comments: No knee joint effusions   Skin:     General: Skin is warm and dry.      Findings: No rash.   Neurological:      General: No focal deficit present.      Mental Status: She is alert and oriented to person, place, and time.         CRANIAL NERVES     CN III, IV, VI   Pupils are equal, round, and reactive to light.     Significant Labs: All pertinent labs within the past 24 hours have been reviewed.  Blood Culture: No results for input(s): LABBLOO in the last 48 hours.  CBC: No results for input(s): WBC, HGB, HCT, PLT in the last 48 hours.  CMP: No results for input(s): NA, K, CL, CO2, GLU, BUN, CREATININE, CALCIUM, PROT, ALBUMIN, BILITOT, ALKPHOS, AST, ALT, ANIONGAP, EGFRNONAA in the last 48 hours.    Invalid input(s): ESTGFAFRICA  Cardiac Markers: No results for input(s): CKMB, MYOGLOBIN, BNP, TROPISTAT in the last 48 hours.    Component      Sodium   Potassium   Chloride   CO2   Albumin   Alkaline Phosphatase   ALT   AST   Bilirubin, Total   BUN   Calcium   Creatinine   Total Protein   Glucose   Osmolality Calc   Anion Gap   A/G Ratio   Globulin   Non-AF American GFR   AF American GFR   BUN/Creatinine Ratio   Magnesium   Uric Acid   A/G ratio   eGFR   Lactic Acid, Plasma     Component  05/17/2022 05/05/2022        Sodium 133 Low     136   Potassium 4.2 4.8   Chloride 99 104   CO2 22 24   Albumin 4.2 4.1   Alkaline Phosphatase 94 114   ALT 11 28   AST 21 46 High       Bilirubin, Total 0.6 1.2   BUN 15 20   Calcium 9.4 9.4   Creatinine 0.87 1.17 High       Total Protein 7.5 7.7   Glucose 84 109 High       Osmolality Calc 266 Low     275   Anion Gap 12 8   A/G Ratio -- 1.1   Globulin 3.3 3.6   Non-AF American GFR >60 --   AF American GFR >60 --   BUN/Creatinine Ratio -- --   Magnesium -- --   Uric Acid -- --   A/G ratio 1.3 --   eGFR -- 52   Lactic Acid, Plasma --      Component      WBC   RBC   Hemoglobin   Hematocrit   MCV   MCH   MCHC   RDW   Platelets   MPV   Lymphocytes Relative   Lymphocytes Absolute   Monocytes Relative   Monocytes Absolute   Granulocytes Relative   Granulocytes Absolute   Platelet Count- Automated   Granulocyte Relative   Eosinophils Relative   Basophils Relative   Lymphocyte Absolute   Monocyte Absolute   Eosinophils Absolute   Basophils Absolute   ANC   Neutrophils Absolute   Neutrophils Relative     Component 05/17/2022 05/05/2022          WBC 7.0 6.0 Load older lab results   RBC 3.94 Low     4.73 Load older lab results   Hemoglobin 12.1 14.8 Load older lab results   Hematocrit 36.9 Low     46.1 Load older lab results   MCV 94 97 Load older lab results   MCH 31 31.2 Load older lab results   MCHC 33 32.0 Load older lab results   RDW 13.9 13.9 Load older lab results   Platelets -- 185 Load older lab results   MPV 8.3 7.6 Load older lab results   Lymphocytes Relative 12.1 Low     13.7 Load older lab results   Lymphocytes Absolute -- 0.8 Load older lab results   Monocytes Relative 11.6 High     6.3 Load older lab results   Monocytes Absolute -- 0.3 Load older lab results   Granulocytes Relative -- 80.0 Load older lab results   Granulocytes Absolute 5.1 4.9 Load older lab results   Platelet Count- Automated 191 -- Load older lab results   Granulocyte Relative 72.6 -- Load older  lab results   Eosinophils Relative 2.5 -- Load older lab results   Basophils Relative 1.2 -- Load older lab results   Lymphocyte Absolute 0.9 Low     --        Vancomycin, trough (05/18/2022 11:41 AM CDT)  Lab Results - Vancomycin, trough (05/18/2022 11:41 AM CDT)  Component Value Ref Range Test Method Analysis Time Performed At Pathologist Signature   Vancomycin Trough 8.8 5.0 - 19.9 ug/mL   05/18/2022 12:33 PM CDT [facility]       Lab Results - Vancomycin, trough (05/18/2022 11:41 AM CDT)  Specimen (Source) Anatomical Location / Laterality Collection Method / Volume Collection Time Received Time   Blood BLOOD CELL / Unknown Venipuncture / Unknown 05/18/2022 11:41 AM CDT 05/18/2022 12:08 PM CDT       Significant Imaging: I have reviewed all pertinent imaging results/findings within the past 24 hours.  X-ray chest - AP portable    Result Date: 5/17/2022  Chest one view HISTORY: Cough COMPARISON: 7/30/2021 Stable cardiomegaly sternal silhouette with overlying median sternotomy changes and indwelling cardiac pacer. Stable mild interstitial prominence without focal consolidation, mass or effusion.     Stable exam with no acute abnormality seen. This report was signed by David Wallis MD on 5/17/2022 3:53 PM on the following workstation: [id number].       TTE 5/16/22:   The left ventricle is normal in size with mild eccentric hypertrophy and normal systolic function.  Normal left ventricular diastolic function.  Normal right ventricular size with normal right ventricular systolic function.  There is a mechanical aortic valve present. There is trivial central aortic insufficiency present. Aortic valve area is 1.38 cm2; peak velocity is 3.38 m/s; mean gradient is 21 mmHg. The dimensionless index is 0.33.  Aortic valve area is 1.38 cm2; peak velocity is 3.38 m/s; mean gradient is 21 mmHg. The dimensionless index is 0.33.  A vascular plug is noted in the ascending aorta in a posterolateral location.  The estimated  ejection fraction is 60%.  The ascending aorta is mildly dilated measuring 4.1 cm.  The aortic root at the sinuses of Valsalva is mildly dilated measuring 3.7 cm  Intermediate central venous pressure (8 mmHg).  The estimated PA systolic pressure is 35 mmHg.

## 2022-05-19 NOTE — PROGRESS NOTES
Therapy with Vancomycin complete and/or consult discontinued by provider.  Pharmacy will sign off, please re-consult as needed.    Thank you,  Roxana Blank, PharmD, [facility]  Internal Medicine Clinical Pharmacy Specialist   Ext [phone]

## 2022-05-19 NOTE — ASSESSMENT & PLAN NOTE
On warfarin 5mg daily   -check daily INR  -given that patient may require diagnostic or other invasive procedures this admit - switch to therapeutic lovenox 1mg/kg [id number]
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
## 2022-05-19 NOTE — MEDICAL/APP STUDENT
Hospital Medicine Student   Progress Note  [facility] HOSP MED 4    Admit Date: 5/18/2022  Hospital Day: 1  05/19/2022  4:44 PM    SUBJECTIVE:   Principal Problem: Streptococcal bacteremia.    HPI: TRANSFER CENTER PHYSICIAN SUMMARY  Patient is a 61 y.o. female who has a past medical history of Hypertension, s/p Pacemaker, S/P Mechanical AoVR, S/P thoracic aortic aneurysm repair with Dacron graft (2007), and S/P VSD surgical patch repair 1997 presented with to [facility] ED for abnormal lab results. Patient had one year follow up with interventional cardiology yesterday where she complained of shortness of breath and fevers. She had labs drawn including blood cultures. 5/17 blood cultures returned positive for GPC in chains. Patient advised to go to local ED for further management. On arrival to ED work up unrevealing. Blood cultures have been repeated. She was started on IV Vancomycin. Vitals stable. Facility seeking transfer for ID and cardiology evaluation. Stable for transfer.     BEDSIDE EVAL  Seen in cardiology clinic on 5/16 - had c/o of fevers, fatigue, malaise and blood cultures were sent, ECHO completed and patient notified on 5/17 of positive blood culture results, she was advised to seek care,  ID staff Dr. Frieda Gutierrez requested patient transfer to [facility] from [facility] ED.   Patient reports that 6-8 weeks ago she had a bad sinus infection that transitioned to itchy/watery eyes and had a persistent non-productive cough, overall hasn't felt well since this time, her fevers have been sporadic with progressively worsening fatigue prior to her clinic appointment 5/16.  She has been adherent to home medications including her anti-hypertensives.    She notes gingival bleeding when brushing her teeth, unsure of her last INR, chronically on Coumadin.  She had Right Eye Cataract surgery on 05/06/22, supposed to have Left Eye completed this week.  No dental procedures, denies any skin changes or  rash.   She has hx of Mechanical aortic valve, Pacemaker placement (98% paced) and has Left knee arthroplasty     Hospital Course: Pt started on vancomycin on 5/18 blood cultures from 5/16 pending sensitivity. She had a TTE on 5/16 that was negative for vegetations. CELSO pending. Cardiology, ID, and CTS consulted.      Interval history: NAEON. Pt says she feels like her usual self. No complaints at the moment. ABx switched to ceftriaxone after sensitivity results. Positive for stress and anxiety. Negative for recent fever, chills, nausia, vomiting, fategue, recent night sweats, LOC, HA, slurring speech, SOB, chest pain, palpitations, constipation, or diarrhea.    Review of Systems   Constitutional: Positive for fever and malaise/fatigue. Negative for chills.   HENT: Negative.    Eyes: Positive for blurred vision and photophobia. Negative for double vision and pain.   Respiratory: Negative for cough, hemoptysis, sputum production and shortness of breath.    Cardiovascular: Negative for chest pain, palpitations, orthopnea, claudication, leg swelling and PND.   Gastrointestinal: Negative for abdominal pain, blood in stool, constipation, diarrhea, heartburn, nausea and vomiting.   Genitourinary: Negative.    Musculoskeletal: Negative for myalgias.   Skin: Negative.    Neurological: Positive for seizures. Negative for dizziness, sensory change, speech change, focal weakness, loss of consciousness and headaches.   Psychiatric/Behavioral: Negative for depression. The patient is nervous/anxious.        Please refer to the H&P for past medical, family, and social history.    OBJECTIVE:     Vital Signs Recent:  Temp: 97.8 °F (36.6 °C) (05/19/22 1542)  Pulse: 68 (05/19/22 1542)  Resp: 18 (05/19/22 1542)  BP: 122/68 (05/19/22 1542)  SpO2: (!) 94 % (05/19/22 1542)  Oxygen Documentation:                O2 Device (Oxygen Therapy): room air         Vital Signs Range (Last 24H):  Temp:  [97.6 °F (36.4 °C)-98.7 °F (37.1 °C)]   Pulse:   [64-74]   Resp:  [16-18]   BP: (100-143)/(56-78)   SpO2:  [94 %-100 %]        I & O (Last 24H):    Intake/Output Summary (Last 24 hours) at 5/19/2022 1644  Last data filed at 5/19/2022 0116  Gross per 24 hour   Intake 643.06 ml   Output --   Net 643.06 ml        Physical Exam:  Physical Exam    Labs:   Recent Labs   Lab 05/19/22  0538 05/19/22  0833    141   K 3.9 4.1    109   CO2 24 23   BUN 11 11   CREATININE 0.7 0.8   GLU 70 91   CALCIUM 8.8 9.1     Recent Labs   Lab 05/16/22  1302 05/19/22  0538   ALKPHOS 98  --    ALT 13  --    AST 21  --    ALBUMIN 3.8  --    PROT 7.9  --    BILITOT 0.7  --    INR  --  2.1*     Recent Labs   Lab 05/16/22  1302 05/19/22  0538 05/19/22  0833   WBC 8.27 5.59 5.54   HGB 13.2 11.2* 12.1   HCT 40.4 35.2* 37.9    169 176       Diagnostic Results:  TTE (5/16):   · The left ventricle is normal in size with mild eccentric hypertrophy and normal systolic function.  · Normal left ventricular diastolic function.  · Normal right ventricular size with normal right ventricular systolic function.  · There is a mechanical aortic valve present. There is trivial central aortic insufficiency present. Aortic valve area is 1.38 cm2; peak velocity is 3.38 m/s; mean gradient is 21 mmHg. The dimensionless index is 0.33.  · Aortic valve area is 1.38 cm2; peak velocity is 3.38 m/s; mean gradient is 21 mmHg. The dimensionless index is 0.33.  · A vascular plug is noted in the ascending aorta in a posterolateral location.  · The estimated ejection fraction is 60%.  · The ascending aorta is mildly dilated measuring 4.1 cm.  · The aortic root at the sinuses of Valsalva is mildly dilated measuring 3.7 cm  · Intermediate central venous pressure (8 mmHg).  · The estimated PA systolic pressure is 35 mmHg.    EKG (5/19):  Atrial-paced rhythm with prolonged AV conduction   Left anterior fascicular block   LVH with secondary ST-T wave changes   Abnormal ECG   When compared with ECG of 05-JUN-2009  10:34,   T wave inversion no longer evident in Anterior leads     Scheduled Meds:   cefTRIAXone (ROCEPHIN) IVPB  2 g Intravenous Q24H    enoxaparin  60 mg Subcutaneous Q12H    metoprolol succinate  50 mg Oral Daily    And    metoprolol succinate  100 mg Oral QHS    polyethylene glycol  17 g Oral Daily    prednisoLONE acetate  1 drop Right Eye QID    sotaloL  80 mg Oral BID     Continuous Infusions:  PRN Meds:acetaminophen, ALPRAZolam, cetirizine, melatonin, naloxone, senna-docusate 8.6-50 mg, sodium chloride 0.9%, zolpidem    ASSESSMENT/PLAN:   Ms. Naina Keyes is a 61 y.o. female with a relevant medical history of Hypertension, s/p Pacemaker, S/P Mechanical AoVR, S/P thoracic aortic aneurysm repair with Dacron graft (2007), and S/P VSD surgical patch repair 1997 who is being followed up for Streptococcal bacteremia.    1.Streptococcal bacteremia  ASSESSMENT:  Pt has extensive cardiac history with Mechanical AoVR and implanted pacemaker. Had blood cultures positive for Strep. Mitis. Concerning for potential infective endocarditis or infected pacemaker shivani. Originally started on vancomycin and switched to ceftriaxone after sensitivity studies.   PLAN:  -Continuing ceftriaxone. Consulted cardiology - F/U with TTE and evaluating PM extraction. Consulted CTS - Continue to manage with antibiotics, redo valve operation would be risky. Consulted ID - will F/U with recs    2. Age-related cataracts  ASSESSMENT: Had R cataract surgery on 5/6 - receiving prednisolone eye drops. Had L eye surgery scheduled for this week and it will be delayed.   PLAN: Pt offered eye shield due to photophobia and she declined, will continue to monitor.     3. Anticoagulation  ASSESSMENT: Pt on warfarin 5mg daily.  Plan: daily INR, switched to Lovenox 1mg/kg q12hrs in case she needs an invasive procedure.       Lainey Lane, MS3

## 2022-05-19 NOTE — PROGRESS NOTES
Cardiac device interrogation and/or reprogramming completed by industry representative, Maldonado QUINTANA with [employer]; please refer to report located in the Media tab.

## 2022-05-20 LAB
ANION GAP SERPL CALC-SCNC: 8 MMOL/L (ref 8–16)
BASOPHILS # BLD AUTO: 0.04 K/UL (ref 0–0.2)
BASOPHILS NFR BLD: 0.7 % (ref 0–1.9)
BUN SERPL-MCNC: 12 MG/DL (ref 8–23)
CALCIUM SERPL-MCNC: 8.9 MG/DL (ref 8.7–10.5)
CHLORIDE SERPL-SCNC: 109 MMOL/L (ref 95–110)
CO2 SERPL-SCNC: 21 MMOL/L (ref 23–29)
CREAT SERPL-MCNC: 0.8 MG/DL (ref 0.5–1.4)
DIFFERENTIAL METHOD: ABNORMAL
EOSINOPHIL # BLD AUTO: 0.2 K/UL (ref 0–0.5)
EOSINOPHIL NFR BLD: 3.7 % (ref 0–8)
ERYTHROCYTE [DISTWIDTH] IN BLOOD BY AUTOMATED COUNT: 13.4 % (ref 11.5–14.5)
EST. GFR  (AFRICAN AMERICAN): >60 ML/MIN/1.73 M^2
EST. GFR  (NON AFRICAN AMERICAN): >60 ML/MIN/1.73 M^2
GLUCOSE SERPL-MCNC: 77 MG/DL (ref 70–110)
HCT VFR BLD AUTO: 36.5 % (ref 37–48.5)
HGB BLD-MCNC: 11.3 G/DL (ref 12–16)
IMM GRANULOCYTES # BLD AUTO: 0.01 K/UL (ref 0–0.04)
IMM GRANULOCYTES NFR BLD AUTO: 0.2 % (ref 0–0.5)
INR PPP: 2.5 (ref 0.8–1.2)
LYMPHOCYTES # BLD AUTO: 1.2 K/UL (ref 1–4.8)
LYMPHOCYTES NFR BLD: 22 % (ref 18–48)
MCH RBC QN AUTO: 30.6 PG (ref 27–31)
MCHC RBC AUTO-ENTMCNC: 31 G/DL (ref 32–36)
MCV RBC AUTO: 99 FL (ref 82–98)
MONOCYTES # BLD AUTO: 0.5 K/UL (ref 0.3–1)
MONOCYTES NFR BLD: 8.8 % (ref 4–15)
NEUTROPHILS # BLD AUTO: 3.5 K/UL (ref 1.8–7.7)
NEUTROPHILS NFR BLD: 64.6 % (ref 38–73)
NRBC BLD-RTO: 0 /100 WBC
PLATELET # BLD AUTO: 149 K/UL (ref 150–450)
PMV BLD AUTO: 11.3 FL (ref 9.2–12.9)
POTASSIUM SERPL-SCNC: 4.6 MMOL/L (ref 3.5–5.1)
PROTHROMBIN TIME: 24.6 SEC (ref 9–12.5)
RBC # BLD AUTO: 3.69 M/UL (ref 4–5.4)
SODIUM SERPL-SCNC: 138 MMOL/L (ref 136–145)
WBC # BLD AUTO: 5.45 K/UL (ref 3.9–12.7)

## 2022-05-20 PROCEDURE — 99233 SBSQ HOSP IP/OBS HIGH 50: CPT | Mod: ,,, | Performed by: INTERNAL MEDICINE

## 2022-05-20 PROCEDURE — 99233 SBSQ HOSP IP/OBS HIGH 50: CPT | Mod: GC,,, | Performed by: INTERNAL MEDICINE

## 2022-05-20 PROCEDURE — 20600001 HC STEP DOWN PRIVATE ROOM

## 2022-05-20 PROCEDURE — 63600175 PHARM REV CODE 636 W HCPCS: Performed by: HOSPITALIST

## 2022-05-20 PROCEDURE — 25000003 PHARM REV CODE 250: Performed by: HOSPITALIST

## 2022-05-20 PROCEDURE — 85025 COMPLETE CBC W/AUTO DIFF WBC: CPT | Performed by: STUDENT IN AN ORGANIZED HEALTH CARE EDUCATION/TRAINING PROGRAM

## 2022-05-20 PROCEDURE — 80048 BASIC METABOLIC PNL TOTAL CA: CPT | Performed by: STUDENT IN AN ORGANIZED HEALTH CARE EDUCATION/TRAINING PROGRAM

## 2022-05-20 PROCEDURE — 99233 SBSQ HOSP IP/OBS HIGH 50: CPT | Mod: GC,,, | Performed by: STUDENT IN AN ORGANIZED HEALTH CARE EDUCATION/TRAINING PROGRAM

## 2022-05-20 PROCEDURE — 99233 PR SUBSEQUENT HOSPITAL CARE,LEVL III: ICD-10-PCS | Mod: GC,,, | Performed by: INTERNAL MEDICINE

## 2022-05-20 PROCEDURE — 63600175 PHARM REV CODE 636 W HCPCS: Performed by: INTERNAL MEDICINE

## 2022-05-20 PROCEDURE — 25000003 PHARM REV CODE 250: Performed by: STUDENT IN AN ORGANIZED HEALTH CARE EDUCATION/TRAINING PROGRAM

## 2022-05-20 PROCEDURE — 99233 PR SUBSEQUENT HOSPITAL CARE,LEVL III: ICD-10-PCS | Mod: GC,,, | Performed by: STUDENT IN AN ORGANIZED HEALTH CARE EDUCATION/TRAINING PROGRAM

## 2022-05-20 PROCEDURE — 99233 PR SUBSEQUENT HOSPITAL CARE,LEVL III: ICD-10-PCS | Mod: ,,, | Performed by: INTERNAL MEDICINE

## 2022-05-20 PROCEDURE — 85610 PROTHROMBIN TIME: CPT | Performed by: HOSPITALIST

## 2022-05-20 PROCEDURE — 36415 COLL VENOUS BLD VENIPUNCTURE: CPT | Performed by: HOSPITALIST

## 2022-05-20 RX ADMIN — METOPROLOL SUCCINATE 100 MG: 100 TABLET, EXTENDED RELEASE ORAL at 08:05

## 2022-05-20 RX ADMIN — PREDNISOLONE ACETATE 1 DROP: 10 SUSPENSION/ DROPS OPHTHALMIC at 08:05

## 2022-05-20 RX ADMIN — ENOXAPARIN SODIUM 60 MG: 100 INJECTION SUBCUTANEOUS at 11:05

## 2022-05-20 RX ADMIN — SOTALOL HYDROCHLORIDE 80 MG: 80 TABLET ORAL at 08:05

## 2022-05-20 RX ADMIN — PREDNISOLONE ACETATE 1 DROP: 10 SUSPENSION/ DROPS OPHTHALMIC at 09:05

## 2022-05-20 RX ADMIN — CEFTRIAXONE SODIUM 2 G: 2 INJECTION, SOLUTION INTRAVENOUS at 01:05

## 2022-05-20 RX ADMIN — Medication 6 MG: at 08:05

## 2022-05-20 RX ADMIN — METOPROLOL SUCCINATE 50 MG: 50 TABLET, EXTENDED RELEASE ORAL at 08:05

## 2022-05-20 RX ADMIN — ZOLPIDEM TARTRATE 10 MG: 5 TABLET ORAL at 08:05

## 2022-05-20 RX ADMIN — PREDNISOLONE ACETATE 1 DROP: 10 SUSPENSION/ DROPS OPHTHALMIC at 01:05

## 2022-05-20 NOTE — ASSESSMENT & PLAN NOTE
61F with mechanical AoVR presented to clinic with several weeks of malaise and fevers, found to have +blood cultures and recommended for admission. She denies chest pain, shortness of breath, nausea, vomiting, or diarrhea.    - blood cultures 5/16 with strep mitis/oralis  - blood cultures 5/18 ngtd    - Vancomycin pharmacy consult to continue empiric therapy for Strep bacteremia  - ID consulted; recommend de-escalation to CTX  - TTE negative for veg  - CELSO pending; tentatively planned for 5/23 due to full schedule  - EPS and CTS following      Sources differentials - recent sinus infection patient reported, transient bacteremia if pt with gingival bleeding while bruising due to chronic anticoagulation.  Also with recent Cataract surgery, suspect pt had peripheral IV placed, patient unsure

## 2022-05-20 NOTE — ASSESSMENT & PLAN NOTE
On warfarin 5mg daily   - check daily INR  - given that patient may require diagnostic or other invasive procedures this admit   - switch to therapeutic lovenox 1mg/kg [id number] when INR becomes sub-therapeutic

## 2022-05-20 NOTE — SUBJECTIVE & OBJECTIVE
Interval History: KENA. Pt well appearing. States she feels good and no complaints.       Past Medical History:   Diagnosis Date    Heart disease     Hypertension     Pacemaker 11/25/2013    S/P Mechanical AVR 11/25/2013    S/P thoracic aortic aneurysm repair with Dacron graft (2007) 11/25/2013    S/P VSD surgical patch repair -1997 11/25/2013       Past Surgical History:   Procedure Laterality Date    ASCENDING AORTIC ANEURYSM REPAIR      BREAST SURGERY      CARDIAC CATHETERIZATION      CARDIAC PACEMAKER PLACEMENT      HYSTERECTOMY      MECHANICAL AORTIC VALVE REPLACEMENT         Review of patient's allergies indicates:   Allergen Reactions    Iodine Anaphylaxis     contrast    Lidocaine Anaphylaxis     cetacaine spray       Medications:  Facility-Administered Medications Prior to Admission   Medication    [DISCONTINUED] dexamethasone injection 2 mg     Medications Prior to Admission   Medication Sig    acetaminophen (TYLENOL) 500 MG tablet Take 1,000 mg by mouth daily as needed (pain/fever).    ALBUTEROL SULFATE (VENTOLIN HFA INHL) Inhale 1 puff into the lungs as needed (wheezing/shortness of breath).    alprazolam (XANAX) 0.25 MG tablet Take 0.25 mg by mouth 2 (two) times daily as needed for Anxiety.    loratadine (CLARITIN) 10 mg tablet TAKE ONE TABLET BY MOUTH DAILY AS NEEDED FOR ALLERGIES    metoprolol succinate (TOPROL-XL) 50 MG 24 hr tablet Take 1 tablet every Morning and take 2 tablets in the evening    potassium chloride (MICRO-K) 10 MEQ CpSR Take 10 mEq by mouth 2 (two) times daily.     prednisoLONE acetate (PRED FORTE) 1 % DrpS Place 1 drop into the right eye 4 (four) times daily.    sotalol (BETAPACE) 80 MG tablet Take 80 mg by mouth 2 (two) times daily.    valsartan (DIOVAN) 320 MG tablet Take 160 mg by mouth every 12 (twelve) hours. 1/2 in the morning 1/2 at night    WARFARIN SODIUM (COUMADIN ORAL) Take 5 mg by mouth once daily except on Thursday    zolpidem (AMBIEN) 10 mg Tab Take 10 mg by mouth  every evening.     Antibiotics (From admission, onward)                Start     Stop Route Frequency Ordered    22 1445  cefTRIAXone (ROCEPHIN) 2 g/50 mL D5W IVPB         -- IV Every 24 hours (non-standard times) 22 1349          Antifungals (From admission, onward)                None          Antivirals (From admission, onward)      None             Immunization History   Administered Date(s) Administered    Influenza - Quadrivalent - PF *Preferred* (6 months and older) 2017, 2018    MMR 2002, 09/10/2007       Family History       Problem Relation (Age of Onset)    Arrhythmia Mother    Heart disease Mother, Father    Heart failure Mother    Hypertension Mother, Father          Social History     Socioeconomic History    Marital status: Single   Tobacco Use    Smoking status: Former Smoker     Quit date: 2009     Years since quittin.5    Smokeless tobacco: Never Used   Substance and Sexual Activity    Alcohol use: No    Drug use: No     Review of Systems   Constitutional:  Positive for fatigue. Negative for activity change, appetite change, chills, diaphoresis, fever (reports intermittent fevers at home) and unexpected weight change.   HENT:  Negative for congestion, dental problem, mouth sores, postnasal drip, rhinorrhea, sinus pressure, sinus pain and sore throat.    Eyes:  Negative for pain, discharge and itching.   Respiratory:  Negative for cough, chest tightness and shortness of breath.    Cardiovascular:  Positive for palpitations (chronic, infrequent). Negative for chest pain and leg swelling.   Gastrointestinal:  Negative for abdominal distention, abdominal pain, constipation, diarrhea, nausea and vomiting.   Genitourinary:  Negative for difficulty urinating, dysuria, flank pain, frequency, hematuria and urgency.   Musculoskeletal:  Negative for arthralgias, gait problem and myalgias.   Skin:  Negative for color change, pallor, rash and wound.   Neurological:   Negative for dizziness, weakness, light-headedness, numbness and headaches.   Psychiatric/Behavioral:  Negative for agitation, confusion, decreased concentration, hallucinations and sleep disturbance. The patient is not nervous/anxious.    Objective:     Vital Signs (Most Recent):  Temp: 97.5 °F (36.4 °C) (05/20/22 1154)  Pulse: 71 (05/20/22 1154)  Resp: 18 (05/20/22 1154)  BP: 132/71 (05/20/22 1154)  SpO2: 99 % (05/20/22 1154) Vital Signs (24h Range):  Temp:  [97.5 °F (36.4 °C)-98 °F (36.7 °C)] 97.5 °F (36.4 °C)  Pulse:  [58-71] 71  Resp:  [18-20] 18  SpO2:  [94 %-99 %] 99 %  BP: (115-134)/(57-71) 132/71     Weight: 58.5 kg (128 lb 15.5 oz)  Body mass index is 22.85 kg/m².    Estimated Creatinine Clearance: 59.5 mL/min (based on SCr of 0.8 mg/dL).    Physical Exam  Vitals and nursing note reviewed.   Constitutional:       General: She is not in acute distress.     Appearance: Normal appearance. She is normal weight. She is not ill-appearing, toxic-appearing or diaphoretic.   HENT:      Head: Normocephalic.      Nose: Nose normal. No congestion.      Mouth/Throat:      Mouth: Mucous membranes are moist.      Pharynx: Oropharynx is clear.   Eyes:      General:         Right eye: No discharge.         Left eye: No discharge.      Conjunctiva/sclera: Conjunctivae normal.   Cardiovascular:      Rate and Rhythm: Normal rate and regular rhythm.      Pulses: Normal pulses.      Heart sounds: Murmur heard.     No friction rub. No gallop.   Pulmonary:      Effort: Pulmonary effort is normal. No respiratory distress.      Breath sounds: Normal breath sounds. No stridor. No wheezing, rhonchi or rales.   Chest:      Chest wall: No tenderness.   Abdominal:      General: Abdomen is flat. Bowel sounds are normal. There is no distension.      Palpations: Abdomen is soft. There is no mass.      Tenderness: There is no abdominal tenderness. There is no guarding or rebound.   Musculoskeletal:         General: No swelling, tenderness,  deformity or signs of injury. Normal range of motion.      Cervical back: Normal range of motion.      Right lower leg: No edema.      Left lower leg: No edema.   Skin:     General: Skin is warm and dry.   Neurological:      General: No focal deficit present.      Mental Status: She is alert and oriented to person, place, and time. Mental status is at baseline.      Motor: No weakness.      Gait: Gait normal.   Psychiatric:         Mood and Affect: Mood normal.         Behavior: Behavior normal.         Thought Content: Thought content normal.         Judgment: Judgment normal.       Significant Labs: All pertinent labs within the past 24 hours have been reviewed.    Significant Imaging: I have reviewed all pertinent imaging results/findings within the past 24 hours.

## 2022-05-20 NOTE — MEDICAL/APP STUDENT
Hospital Medicine Student   Progress Note  [facility] HOSP MED 4    Admit Date: 5/18/2022  Hospital Day: 2  05/20/2022  12:54 PM    SUBJECTIVE:   Principal Problem: Streptococcal bacteremia.    HPI: TRANSFER CENTER PHYSICIAN SUMMARY  Patient is a 61 y.o. female who has a past medical history of Hypertension, s/p Pacemaker, S/P Mechanical AoVR, S/P thoracic aortic aneurysm repair with Dacron graft (2007), and S/P VSD surgical patch repair 1997 presented with to [facility] ED for abnormal lab results. Patient had one year follow up with interventional cardiology yesterday where she complained of shortness of breath and fevers. She had labs drawn including blood cultures. 5/17 blood cultures returned positive for GPC in chains. Patient advised to go to local ED for further management. On arrival to ED work up unrevealing. Blood cultures have been repeated. She was started on IV Vancomycin. Vitals stable. Facility seeking transfer for ID and cardiology evaluation. Stable for transfer.     BEDSIDE EVAL  Seen in cardiology clinic on 5/16 - had c/o of fevers, fatigue, malaise and blood cultures were sent, ECHO completed and patient notified on 5/17 of positive blood culture results, she was advised to seek care,  ID staff Dr. Frieda Gutierrez requested patient transfer to [facility] from [facility] ED.   Patient reports that 6-8 weeks ago she had a bad sinus infection that transitioned to itchy/watery eyes and had a persistent non-productive cough, overall hasn't felt well since this time, her fevers have been sporadic with progressively worsening fatigue prior to her clinic appointment 5/16.  She has been adherent to home medications including her anti-hypertensives.    She notes gingival bleeding when brushing her teeth, unsure of her last INR, chronically on Coumadin.  She had Right Eye Cataract surgery on 05/06/22, supposed to have Left Eye completed this week.  No dental procedures, denies any skin changes or  rash.   She has hx of Mechanical aortic valve, Pacemaker placement (98% paced) and has Left knee arthroplasty     Hospital Course: Pt started on vancomycin on 5/18 blood cultures from 5/16 pending sensitivity. She had a TTE on 5/16 that was negative for vegetations. CELSO pending. Cardiology, ID, and CTS consulted. (5/19) Pt says she feels like her usual self. No complaints at the moment. ABx switched to ceftriaxone after sensitivity results. Positive for stress and anxiety. Negative for recent fever, chills, nausia, vomiting, fategue, recent night sweats, LOC, HA, slurring speech, SOB, chest pain, palpitations, constipation, or diarrhea.     Interval history: NAEON. Pt continues to feel well with no complaints. She slept well last night. No new fevers, chills, night sweats, nausia, vomiting, fategue, LOC, HA, slurring speech, SOB, chest pain, palpitations, constipation, or diarrhea. She is still anxious about the possibility of valve surgery.       Review of Systems   Constitutional: Negative for chills, fever and malaise/fatigue.   HENT: Negative.    Eyes: Positive for blurred vision and photophobia. Negative for double vision.   Respiratory: Negative for cough and shortness of breath.    Cardiovascular: Negative for chest pain, palpitations, orthopnea, claudication, leg swelling and PND.   Gastrointestinal: Negative for abdominal pain, blood in stool, constipation, diarrhea, heartburn, melena, nausea and vomiting.   Genitourinary: Negative.    Musculoskeletal: Negative.    Skin: Negative for itching and rash.   Neurological: Negative for dizziness, sensory change, speech change, focal weakness, seizures, loss of consciousness, weakness and headaches.   Endo/Heme/Allergies: Negative.    Psychiatric/Behavioral: Negative for depression, substance abuse and suicidal ideas. The patient is nervous/anxious. The patient does not have insomnia.         Please refer to the H&P for past medical, family, and social  history.    OBJECTIVE:     Vital Signs Recent:  Temp: 97.5 °F (36.4 °C) (05/20/22 1154)  Pulse: 71 (05/20/22 1154)  Resp: 18 (05/20/22 1154)  BP: 132/71 (05/20/22 1154)  SpO2: 99 % (05/20/22 1154)  Oxygen Documentation:                O2 Device (Oxygen Therapy): room air         Vital Signs Range (Last 24H):  Temp:  [97.5 °F (36.4 °C)-98 °F (36.7 °C)]   Pulse:  [58-71]   Resp:  [18-20]   BP: (115-134)/(57-71)   SpO2:  [94 %-99 %]        I & O (Last 24H):No intake or output data in the 24 hours ending 05/20/22 1254     Physical Exam:  Physical Exam  Constitutional:       General: She is not in acute distress.     Appearance: Normal appearance. She is not ill-appearing.   HENT:      Head: Atraumatic.      Mouth/Throat:      Mouth: Mucous membranes are moist.      Pharynx: Oropharynx is clear.   Eyes:      Extraocular Movements: Extraocular movements intact.      Conjunctiva/sclera: Conjunctivae normal.   Cardiovascular:      Rate and Rhythm: Normal rate and regular rhythm.      Pulses: Normal pulses.      Heart sounds: Murmur heard.      Comments: Grade 5 systolic murmer appreciated.   Pulmonary:      Effort: Pulmonary effort is normal.      Breath sounds: Normal breath sounds.   Abdominal:      General: Abdomen is flat. Bowel sounds are normal. There is no distension.      Palpations: Abdomen is soft.      Tenderness: There is no abdominal tenderness.   Musculoskeletal:         General: Normal range of motion.      Cervical back: Normal range of motion.   Skin:     General: Skin is warm and dry.   Neurological:      General: No focal deficit present.      Mental Status: She is alert and oriented to person, place, and time.      Cranial Nerves: No cranial nerve deficit.      Sensory: No sensory deficit.      Motor: No weakness.   Psychiatric:         Mood and Affect: Mood normal.         Behavior: Behavior normal.         Thought Content: Thought content normal.         Judgment: Judgment normal.       Labs:   Recent  Labs   Lab 05/19/22  0538 05/19/22  0833 05/20/22  0325    141 138   K 3.9 4.1 4.6    109 109   CO2 24 23 21*   BUN 11 11 12   CREATININE 0.7 0.8 0.8   GLU 70 91 77   CALCIUM 8.8 9.1 8.9     Recent Labs   Lab 05/16/22  1302 05/19/22  0538 05/20/22  0325   ALKPHOS 98  --   --    ALT 13  --   --    AST 21  --   --    ALBUMIN 3.8  --   --    PROT 7.9  --   --    BILITOT 0.7  --   --    INR  --  2.1* 2.5*     Recent Labs   Lab 05/19/22  0538 05/19/22  0833 05/20/22  0325   WBC 5.59 5.54 5.45   HGB 11.2* 12.1 11.3*   HCT 35.2* 37.9 36.5*    176 149*       Diagnostic Results:  TTE (5/16):   · The left ventricle is normal in size with mild eccentric hypertrophy and normal systolic function.  · Normal left ventricular diastolic function.  · Normal right ventricular size with normal right ventricular systolic function.  · There is a mechanical aortic valve present. There is trivial central aortic insufficiency present. Aortic valve area is 1.38 cm2; peak velocity is 3.38 m/s; mean gradient is 21 mmHg. The dimensionless index is 0.33.  · Aortic valve area is 1.38 cm2; peak velocity is 3.38 m/s; mean gradient is 21 mmHg. The dimensionless index is 0.33.  · A vascular plug is noted in the ascending aorta in a posterolateral location.  · The estimated ejection fraction is 60%.  · The ascending aorta is mildly dilated measuring 4.1 cm.  · The aortic root at the sinuses of Valsalva is mildly dilated measuring 3.7 cm  · Intermediate central venous pressure (8 mmHg).  · The estimated PA systolic pressure is 35 mmHg.    EKG (5/19):  Atrial-paced rhythm with prolonged AV conduction   Left anterior fascicular block   LVH with secondary ST-T wave changes   Abnormal ECG   When compared with ECG of 05-JUN-2009 10:34,   T wave inversion no longer evident in Anterior leads     Scheduled Meds:   cefTRIAXone (ROCEPHIN) IVPB  2 g Intravenous Q24H    metoprolol succinate  50 mg Oral Daily    And    metoprolol succinate   100 mg Oral QHS    polyethylene glycol  17 g Oral Daily    prednisoLONE acetate  1 drop Right Eye QID    sotaloL  80 mg Oral BID     Continuous Infusions:  PRN Meds:acetaminophen, ALPRAZolam, cetirizine, melatonin, naloxone, senna-docusate 8.6-50 mg, sodium chloride 0.9%, zolpidem    ASSESSMENT/PLAN:   Ms. Naina Keyes is a 61 y.o. female with a relevant medical history of Hypertension, s/p Pacemaker, S/P Mechanical AoVR, S/P thoracic aortic aneurysm repair with Dacron graft (2007), and S/P VSD surgical patch repair 1997 who is being followed up for Streptococcal bacteremia.    1.Streptococcal bacteremia  ASSESSMENT:  Pt has extensive cardiac history with Mechanical AoVR and implanted pacemaker. Had blood cultures positive for Strep. Mitis. Concerning for potential infective endocarditis or infected pacemaker shivani. Originally started on vancomycin and switched to ceftriaxone after sensitivity studies.   PLAN:  -Continuing ceftriaxone 2g IV q12 hrs. Consulted cardiology - F/U with TTE scheduled for Monday and evaluating PM extraction. Consulted CTS - Continue to manage with antibiotics, redo valve operation would be risky. Consulted ID and they agree with the above    2. Age-related cataracts  ASSESSMENT: Had R cataract surgery on 5/6 - receiving prednisolone eye drops. Had L eye surgery scheduled for this week and it will be delayed.   PLAN: Pt offered eye shield due to photophobia and she declined, will continue to monitor.     3. Anticoagulation  ASSESSMENT: Pt on warfarin 5mg daily.  Plan: daily INR, switched to Lovenox 1mg/kg q12hrs in case she needs an invasive procedure.

## 2022-05-20 NOTE — PLAN OF CARE
Patient received, awake and alert, vitals per flowsheet. Denies pain. Patient OOB in recliner with legs elevated. Patient is in better spirits today, verbalizes feeling less anxious, was able to talk to her son yesterday evening. Patient ambulates in room independently with steady gait, refuses bed and chair alarm. Patient educated on POC, questions encouraged. Call bell within reach, no needs at this time.     Problem: Adult Inpatient Plan of Care  Goal: Plan of Care Review  Outcome: Ongoing, Progressing  Goal: Patient-Specific Goal (Individualized)  Outcome: Ongoing, Progressing  Goal: Absence of Hospital-Acquired Illness or Injury  Outcome: Ongoing, Progressing  Goal: Optimal Comfort and Wellbeing  Outcome: Ongoing, Progressing  Goal: Readiness for Transition of Care  Outcome: Ongoing, Progressing     Problem: Infection  Goal: Absence of Infection Signs and Symptoms  Outcome: Ongoing, Progressing     Problem: Fall Injury Risk  Goal: Absence of Fall and Fall-Related Injury  Outcome: Ongoing, Progressing

## 2022-05-20 NOTE — PROGRESS NOTES
[facility] - Telemetry Stepdown  Cardiac Electrophysiology  Progress Note    Admission Date: 5/18/2022  Code Status: Full Code   Attending Physician: Jaz Vicente MD   Expected Discharge Date: 5/24/2022  Principal Problem:Streptococcal bacteremia    Subjective:     Interval History: No acute events overnight. Pt denies any issues at this time.     Review of Systems   Constitutional: Negative for fever and malaise/fatigue.   Eyes:  Negative for blurred vision and pain.   Cardiovascular:  Negative for chest pain, dyspnea on exertion, leg swelling, orthopnea, palpitations and paroxysmal nocturnal dyspnea.   Respiratory:  Negative for cough, shortness of breath, sputum production and wheezing.    Hematologic/Lymphatic: Negative for adenopathy and bleeding problem.   Skin:  Negative for rash.   Musculoskeletal:  Negative for back pain and neck pain.   Gastrointestinal:  Negative for abdominal pain, constipation, diarrhea, nausea and vomiting.   Genitourinary:  Negative for dysuria.   Neurological:  Negative for dizziness, headaches, light-headedness and weakness.   Objective:     Vital Signs (Most Recent):  Temp: 97.5 °F (36.4 °C) (05/20/22 1154)  Pulse: 71 (05/20/22 1154)  Resp: 18 (05/20/22 1154)  BP: 132/71 (05/20/22 1154)  SpO2: 99 % (05/20/22 1154) Vital Signs (24h Range):  Temp:  [97.5 °F (36.4 °C)-98 °F (36.7 °C)] 97.5 °F (36.4 °C)  Pulse:  [58-71] 71  Resp:  [18-20] 18  SpO2:  [94 %-99 %] 99 %  BP: (115-134)/(57-71) 132/71     Weight: 58.5 kg (128 lb 15.5 oz)  Body mass index is 22.85 kg/m².     SpO2: 99 %  O2 Device (Oxygen Therapy): room air    Physical Exam  Constitutional:       General: She is not in acute distress.     Appearance: Normal appearance. She is not ill-appearing, toxic-appearing or diaphoretic.   HENT:      Head: Normocephalic and atraumatic.      Nose: Nose normal.   Eyes:      Extraocular Movements: Extraocular movements intact.      Pupils: Pupils are equal, round, and reactive to light.    Cardiovascular:      Rate and Rhythm: Normal rate and regular rhythm.      Heart sounds: No murmur heard.    No friction rub. No gallop.   Pulmonary:      Effort: Pulmonary effort is normal. No respiratory distress.      Breath sounds: Normal breath sounds. No wheezing or rales.   Abdominal:      General: Abdomen is flat. There is no distension.      Palpations: Abdomen is soft. There is no mass.      Tenderness: There is no abdominal tenderness.   Musculoskeletal:         General: No swelling. Normal range of motion.      Cervical back: Normal range of motion. No rigidity.      Right lower leg: No edema.      Left lower leg: No edema.   Skin:     General: Skin is warm and dry.      Coloration: Skin is not jaundiced.      Findings: No bruising.   Neurological:      General: No focal deficit present.      Mental Status: She is alert and oriented to person, place, and time.      Cranial Nerves: No cranial nerve deficit.      Motor: No weakness.       Significant Labs: BMP:   Recent Labs   Lab 05/19/22  0538 05/19/22  0833 05/20/22  0325   GLU 70 91 77    141 138   K 3.9 4.1 4.6    109 109   CO2 24 23 21*   BUN 11 11 12   CREATININE 0.7 0.8 0.8   CALCIUM 8.8 9.1 8.9    and CBC:   Recent Labs   Lab 05/19/22 0538 05/19/22  0833 05/20/22  0325   WBC 5.59 5.54 5.45   HGB 11.2* 12.1 11.3*   HCT 35.2* 37.9 36.5*    176 149*       Significant Imaging: Reviewed     Assessment and Plan:     Patient is a 61 y.o. female who has a past medical history of Hypertension, Mechanical AoVR in 1997 with subsequent CHB requiring PPM, S/P thoracic aortic aneurysm repair with Dacron graft (2007), and S/P VSD surgical patch repair 1997 presented with to [facility] ED for abnormal lab results. Patient had one year follow up with interventional cardiology where she complained of shortness of breath and fevers. She had labs drawn including blood cultures. 5/17 blood cultures returned positive for GPC in  chains. Patient advised to go to local ED for further management. 5/16 BCx positive for strep mitis      EP was consulted for bacteremia and evaluation for PPM extraction. PPM is [employer] with 2 leads placed in 1997, and PM replaced in Mar 2015. Pt denies any issues at this time.      * Streptococcal bacteremia  Pt reports CHB after AVR in 1997. PPM details as noted above      -Given system from 1997, pt not a candidate for extraction at this time    Thank you for your consult. EP will sign off at this time. Please contact us if you have any additional questions.     Julio Varela MD  Cardiac Electrophysiology  [facility] - Telemetry Stepdown

## 2022-05-20 NOTE — SUBJECTIVE & OBJECTIVE
Interval History: No acute events overnight. Pt denies any issues at this time.     Review of Systems   Constitutional: Negative for fever and malaise/fatigue.   Eyes:  Negative for blurred vision and pain.   Cardiovascular:  Negative for chest pain, dyspnea on exertion, leg swelling, orthopnea, palpitations and paroxysmal nocturnal dyspnea.   Respiratory:  Negative for cough, shortness of breath, sputum production and wheezing.    Hematologic/Lymphatic: Negative for adenopathy and bleeding problem.   Skin:  Negative for rash.   Musculoskeletal:  Negative for back pain and neck pain.   Gastrointestinal:  Negative for abdominal pain, constipation, diarrhea, nausea and vomiting.   Genitourinary:  Negative for dysuria.   Neurological:  Negative for dizziness, headaches, light-headedness and weakness.   Objective:     Vital Signs (Most Recent):  Temp: 97.5 °F (36.4 °C) (05/20/22 1154)  Pulse: 71 (05/20/22 1154)  Resp: 18 (05/20/22 1154)  BP: 132/71 (05/20/22 1154)  SpO2: 99 % (05/20/22 1154) Vital Signs (24h Range):  Temp:  [97.5 °F (36.4 °C)-98 °F (36.7 °C)] 97.5 °F (36.4 °C)  Pulse:  [58-71] 71  Resp:  [18-20] 18  SpO2:  [94 %-99 %] 99 %  BP: (115-134)/(57-71) 132/71     Weight: 58.5 kg (128 lb 15.5 oz)  Body mass index is 22.85 kg/m².     SpO2: 99 %  O2 Device (Oxygen Therapy): room air    Physical Exam  Constitutional:       General: She is not in acute distress.     Appearance: Normal appearance. She is not ill-appearing, toxic-appearing or diaphoretic.   HENT:      Head: Normocephalic and atraumatic.      Nose: Nose normal.   Eyes:      Extraocular Movements: Extraocular movements intact.      Pupils: Pupils are equal, round, and reactive to light.   Cardiovascular:      Rate and Rhythm: Normal rate and regular rhythm.      Heart sounds: No murmur heard.    No friction rub. No gallop.   Pulmonary:      Effort: Pulmonary effort is normal. No respiratory distress.      Breath sounds: Normal breath sounds. No  wheezing or rales.   Abdominal:      General: Abdomen is flat. There is no distension.      Palpations: Abdomen is soft. There is no mass.      Tenderness: There is no abdominal tenderness.   Musculoskeletal:         General: No swelling. Normal range of motion.      Cervical back: Normal range of motion. No rigidity.      Right lower leg: No edema.      Left lower leg: No edema.   Skin:     General: Skin is warm and dry.      Coloration: Skin is not jaundiced.      Findings: No bruising.   Neurological:      General: No focal deficit present.      Mental Status: She is alert and oriented to person, place, and time.      Cranial Nerves: No cranial nerve deficit.      Motor: No weakness.       Significant Labs: BMP:   Recent Labs   Lab 05/19/22  0538 05/19/22  0833 05/20/22  0325   GLU 70 91 77    141 138   K 3.9 4.1 4.6    109 109   CO2 24 23 21*   BUN 11 11 12   CREATININE 0.7 0.8 0.8   CALCIUM 8.8 9.1 8.9    and CBC:   Recent Labs   Lab 05/19/22 0538 05/19/22  0833 05/20/22  0325   WBC 5.59 5.54 5.45   HGB 11.2* 12.1 11.3*   HCT 35.2* 37.9 36.5*    176 149*       Significant Imaging: Reviewed
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
## 2022-05-20 NOTE — ASSESSMENT & PLAN NOTE
Patient reports chronically paced  Need to evaluate for infected leads  - EP consult to review and discuss options with patient  - CELSO pending; will likely be 5/23

## 2022-05-20 NOTE — ASSESSMENT & PLAN NOTE
61 year old female PMH hypertension, pacemaker, mechanical AoVR, thoracic aortic aneurysm repair - dacron graft- 2007, who [place] [facility] [place] from [facility] ED for streptococcal bacteremia.     Pt with no leukocytosis, labs unremarkable thus far. Afebrile. Initial blood cultures from 5/16/22 are + for strep oralis in 4/4 bottles, sensitive to ceftriaxone. Repeat cultures NGTD. 2D echo from 5/16/22 significant for ascending aorta vascular plug and mild aorta dilation, no vegetations seen. CELSO pending. Pt denies recent illness, drug use, or dental work. Did state she frequently uses metal pick for dental plaque which causes gum bleeding.      ID was consulted for bacteremia complicated by pacemaker presence. Pt is currently on Ceftriaxone.     Recommendations:  - Continue ceftriaxone 2g IV q24 hours for potential endocarditis.   - Follow CTS for surgical recommendations   - CELSO planned for monday, follow for results   - Pt seen and reviewed with ID staff, Dr. Moore. ID will follow.

## 2022-05-20 NOTE — SUBJECTIVE & OBJECTIVE
Interval History: NAEON. Feels well this morning. Plan for CELSO on 5/23.    Review of Systems   Constitutional:  Positive for chills, fatigue and fever.   HENT:  Negative for sore throat and trouble swallowing.    Eyes:  Positive for photophobia (right eye).   Respiratory:  Positive for cough. Negative for shortness of breath and wheezing.    Cardiovascular:  Negative for chest pain, palpitations and leg swelling.   Gastrointestinal:  Negative for nausea and vomiting.   Genitourinary:  Negative for dysuria.   Musculoskeletal:  Negative for arthralgias, back pain and gait problem.   Skin:  Negative for rash.   Neurological:  Negative for dizziness and headaches.   Psychiatric/Behavioral:  Negative for confusion.    Objective:     Vital Signs (Most Recent):  Temp: 97.5 °F (36.4 °C) (05/20/22 1154)  Pulse: 71 (05/20/22 1154)  Resp: 18 (05/20/22 1154)  BP: 132/71 (05/20/22 1154)  SpO2: 99 % (05/20/22 1154) Vital Signs (24h Range):  Temp:  [97.5 °F (36.4 °C)-98 °F (36.7 °C)] 97.5 °F (36.4 °C)  Pulse:  [58-71] 71  Resp:  [18-20] 18  SpO2:  [94 %-99 %] 99 %  BP: (115-134)/(57-71) 132/71     Weight: 58.5 kg (128 lb 15.5 oz)  Body mass index is 22.85 kg/m².  No intake or output data in the 24 hours ending 05/20/22 1249   Physical Exam  Vitals and nursing note reviewed.   Constitutional:       General: She is not in acute distress.     Appearance: Normal appearance. She is not ill-appearing or toxic-appearing.   HENT:      Head: Normocephalic and atraumatic.      Right Ear: External ear normal.      Left Ear: External ear normal.      Nose: Nose normal.      Mouth/Throat:      Mouth: Mucous membranes are moist.      Pharynx: Oropharynx is clear.   Eyes:      Extraocular Movements: Extraocular movements intact.   Cardiovascular:      Rate and Rhythm: Normal rate.      Pulses: Normal pulses.      Heart sounds: Murmur heard.      Comments: Mid-systolic click  Systolic ejection murmur  Pulmonary:      Effort: Pulmonary effort is  normal. No respiratory distress.      Breath sounds: Normal breath sounds. No wheezing.   Abdominal:      General: Abdomen is flat. Bowel sounds are normal. There is no distension.      Palpations: Abdomen is soft.      Tenderness: There is no abdominal tenderness.   Musculoskeletal:         General: No swelling.      Cervical back: Neck supple. No tenderness.      Right lower leg: No edema.      Left lower leg: No edema.   Lymphadenopathy:      Cervical: No cervical adenopathy.   Skin:     Findings: No bruising or rash.      Comments: No osler nodes, janeway lesions, or splinter hemorrhages   Neurological:      General: No focal deficit present.      Mental Status: She is alert and oriented to person, place, and time.      Motor: No weakness.   Psychiatric:         Mood and Affect: Mood normal.         Thought Content: Thought content normal.       Significant Labs: All pertinent labs within the past 24 hours have been reviewed.  Blood Culture:   Recent Labs   Lab 05/18/22  2310 05/18/22  2320   LABBLOO No Growth to date  No Growth to date No Growth to date  No Growth to date     CBC:   Recent Labs   Lab 05/19/22  0538 05/19/22  0833 05/20/22  0325   WBC 5.59 5.54 5.45   HGB 11.2* 12.1 11.3*   HCT 35.2* 37.9 36.5*    176 149*     CMP:   Recent Labs   Lab 05/19/22  0538 05/19/22  0833 05/20/22  0325    141 138   K 3.9 4.1 4.6    109 109   CO2 24 23 21*   GLU 70 91 77   BUN 11 11 12   CREATININE 0.7 0.8 0.8   CALCIUM 8.8 9.1 8.9   ANIONGAP 7* 9 8   EGFRNONAA >60.0 >60.0 >60.0       Significant Imaging: I have reviewed all pertinent imaging results/findings within the past 24 hours.

## 2022-05-20 NOTE — ASSESSMENT & PLAN NOTE
Pt reports CHB after AVR in 1997. PPM is [employer] with 2 leads placed in 2017, and PM replaced in Mar 2015.       -Will follow up CELSO results   -Agree with ID consult   -Will having ongoing evaluation for PPM extraction

## 2022-05-20 NOTE — PROGRESS NOTES
[facility] - Telemetry [facility] Medicine  Progress Note    Patient Name: Naina Keyes  MRN: [id number]  Patient Class: IP- Inpatient   Admission Date: 5/18/2022  Length of Stay: 2 days  Attending Physician: Jaz Vicente MD  Primary Care Provider: Anjana Torres NP        Subjective:     Principal Problem:Streptococcal bacteremia        HPI:  TRANSFER CENTER PHYSICIAN SUMMARY  Patient is a 61 y.o. female who has a past medical history of Hypertension, s/p Pacemaker, S/P Mechanical AoVR, S/P thoracic aortic aneurysm repair with Dacron graft (2007), and S/P VSD surgical patch repair 1997 presented with to [facility] ED for abnormal lab results. Patient had one year follow up with interventional cardiology yesterday where she complained of shortness of breath and fevers. She had labs drawn including blood cultures. 5/17 blood cultures returned positive for GPC in chains. Patient advised to go to local ED for further management. On arrival to ED work up unrevealing. Blood cultures have been repeated. She was started on IV Vancomycin. Vitals stable. Facility seeking transfer for ID and cardiology evaluation. Stable for transfer.      BEDSIDE EVAL  Seen in cardiology clinic on 5/16 - had c/o of fevers, fatigue, malaise and blood cultures were sent, ECHO completed and patient notified on 5/17 of positive blood culture results, she was advised to seek care,  ID staff Dr. Frieda Gutierrez requested patient transfer to [facility] from [facility] ED.     Patient reports that 6-8 weeks ago she had a bad sinus infection that transitioned to itchy/watery eyes and had a persistent non-productive cough, overall hasn't felt well since this time, her fevers have been sporadic with progressively worsening fatigue prior to her clinic appointment 5/16.  She has been adherent to home medications including her anti-hypertensives.      She notes gingival bleeding when brushing her teeth, unsure of her last INR,  chronically on Coumadin.  She had Right Eye Cataract surgery on 05/06/22, supposed to have Left Eye completed this week.  No dental procedures, denies any skin changes or rash.   She has hx of Mechanical aortic valve, Pacemaker placement (98% paced) and has Left knee arthroplasty       Overview/Hospital Course:  61F with extensive cardiac history, including pacemaker, s/p mechanical AoVR, s/p thoracic aortic aneurysm repair, and s/p VSD patch repair. She is here with streptococcal bacteremia (+cultures 5/16) after several weeks of fatigue and undulating fevers. EPS, ID, and CTS consulted. She was started on vanc; culture resulted pan-sensitive so de-escalated to ceftriaxone. Cultures cleared on 5/18. TTE negative for vegetation, CELSO pending.       Interval History: NAEON. Feels well this morning. Plan for CELSO on 5/23.    Review of Systems   Constitutional:  Positive for chills, fatigue and fever.   HENT:  Negative for sore throat and trouble swallowing.    Eyes:  Positive for photophobia (right eye).   Respiratory:  Positive for cough. Negative for shortness of breath and wheezing.    Cardiovascular:  Negative for chest pain, palpitations and leg swelling.   Gastrointestinal:  Negative for nausea and vomiting.   Genitourinary:  Negative for dysuria.   Musculoskeletal:  Negative for arthralgias, back pain and gait problem.   Skin:  Negative for rash.   Neurological:  Negative for dizziness and headaches.   Psychiatric/Behavioral:  Negative for confusion.    Objective:     Vital Signs (Most Recent):  Temp: 97.5 °F (36.4 °C) (05/20/22 1154)  Pulse: 71 (05/20/22 1154)  Resp: 18 (05/20/22 1154)  BP: 132/71 (05/20/22 1154)  SpO2: 99 % (05/20/22 1154) Vital Signs (24h Range):  Temp:  [97.5 °F (36.4 °C)-98 °F (36.7 °C)] 97.5 °F (36.4 °C)  Pulse:  [58-71] 71  Resp:  [18-20] 18  SpO2:  [94 %-99 %] 99 %  BP: (115-134)/(57-71) 132/71     Weight: 58.5 kg (128 lb 15.5 oz)  Body mass index is 22.85 kg/m².  No intake or output data  in the 24 hours ending 05/20/22 1249   Physical Exam  Vitals and nursing note reviewed.   Constitutional:       General: She is not in acute distress.     Appearance: Normal appearance. She is not ill-appearing or toxic-appearing.   HENT:      Head: Normocephalic and atraumatic.      Right Ear: External ear normal.      Left Ear: External ear normal.      Nose: Nose normal.      Mouth/Throat:      Mouth: Mucous membranes are moist.      Pharynx: Oropharynx is clear.   Eyes:      Extraocular Movements: Extraocular movements intact.   Cardiovascular:      Rate and Rhythm: Normal rate.      Pulses: Normal pulses.      Heart sounds: Murmur heard.      Comments: Mid-systolic click  Systolic ejection murmur  Pulmonary:      Effort: Pulmonary effort is normal. No respiratory distress.      Breath sounds: Normal breath sounds. No wheezing.   Abdominal:      General: Abdomen is flat. Bowel sounds are normal. There is no distension.      Palpations: Abdomen is soft.      Tenderness: There is no abdominal tenderness.   Musculoskeletal:         General: No swelling.      Cervical back: Neck supple. No tenderness.      Right lower leg: No edema.      Left lower leg: No edema.   Lymphadenopathy:      Cervical: No cervical adenopathy.   Skin:     Findings: No bruising or rash.      Comments: No osler nodes, janeway lesions, or splinter hemorrhages   Neurological:      General: No focal deficit present.      Mental Status: She is alert and oriented to person, place, and time.      Motor: No weakness.   Psychiatric:         Mood and Affect: Mood normal.         Thought Content: Thought content normal.       Significant Labs: All pertinent labs within the past 24 hours have been reviewed.  Blood Culture:   Recent Labs   Lab 05/18/22  2310 05/18/22  2320   LABBLOO No Growth to date  No Growth to date No Growth to date  No Growth to date     CBC:   Recent Labs   Lab 05/19/22  0538 05/19/22  0833 05/20/22  0325   WBC 5.59 5.54 5.45    HGB 11.2* 12.1 11.3*   HCT 35.2* 37.9 36.5*    176 149*     CMP:   Recent Labs   Lab 05/19/22  0538 05/19/22  0833 05/20/22  0325    141 138   K 3.9 4.1 4.6    109 109   CO2 24 23 21*   GLU 70 91 77   BUN 11 11 12   CREATININE 0.7 0.8 0.8   CALCIUM 8.8 9.1 8.9   ANIONGAP 7* 9 8   EGFRNONAA >60.0 >60.0 >60.0       Significant Imaging: I have reviewed all pertinent imaging results/findings within the past 24 hours.      Assessment/Plan:      * Streptococcal bacteremia  61F with mechanical AoVR presented to clinic with several weeks of malaise and fevers, found to have +blood cultures and recommended for admission. She denies chest pain, shortness of breath, nausea, vomiting, or diarrhea.    - blood cultures 5/16 with strep mitis/oralis  - blood cultures 5/18 ngtd    - Vancomycin pharmacy consult to continue empiric therapy for Strep bacteremia  - ID consulted; recommend de-escalation to CTX  - TTE negative for veg  - CELSO pending; tentatively planned for 5/23 due to full schedule  - EPS and CTS following      Sources differentials - recent sinus infection patient reported, transient bacteremia if pt with gingival bleeding while bruising due to chronic anticoagulation.  Also with recent Cataract surgery, suspect pt had peripheral IV placed, patient unsure    Age-related cataract of both eyes  S/p right cataract/lens surgery on 5/6 - receiving Steroid eye drops Pred-forte 4 times daily - continue for now  -left eye was to be this week, will be delayed for time being.     Pt offered eye shield due to photophobia, declines at this time      Warfarin anticoagulation  On warfarin 5mg daily   - check daily INR  - given that patient may require diagnostic or other invasive procedures this admit   - switch to therapeutic lovenox 1mg/kg [id number] when INR becomes sub-therapeutic      Pacemaker  Patient reports chronically paced  Need to evaluate for infected leads  - EP consult to review and discuss options  with patient  - CELSO pending; will likely be 5/23        VTE Risk Mitigation (From admission, onward)         Ordered     IP VTE LOW RISK PATIENT  Once         05/18/22 2129     Place sequential compression device  Until discontinued         05/18/22 2129                Discharge Planning   OFELIA: 5/24/2022     Code Status: Full Code   Is the patient medically ready for discharge?: No    Reason for patient still in hospital (select all that apply): Patient trending condition, Laboratory test, Treatment, Imaging, Consult recommendations, PT / OT recommendations and Pending disposition  Discharge Plan A: Home with family                  Melonie Rosenberg DO  Department of Hospital Medicine   [facility] - Telemetry Stepdown

## 2022-05-21 LAB
ANION GAP SERPL CALC-SCNC: 9 MMOL/L (ref 8–16)
BASOPHILS # BLD AUTO: 0.04 K/UL (ref 0–0.2)
BASOPHILS NFR BLD: 0.8 % (ref 0–1.9)
BUN SERPL-MCNC: 11 MG/DL (ref 8–23)
CALCIUM SERPL-MCNC: 8.8 MG/DL (ref 8.7–10.5)
CHLORIDE SERPL-SCNC: 107 MMOL/L (ref 95–110)
CO2 SERPL-SCNC: 24 MMOL/L (ref 23–29)
CREAT SERPL-MCNC: 0.8 MG/DL (ref 0.5–1.4)
DIFFERENTIAL METHOD: ABNORMAL
EOSINOPHIL # BLD AUTO: 0.1 K/UL (ref 0–0.5)
EOSINOPHIL NFR BLD: 2.9 % (ref 0–8)
ERYTHROCYTE [DISTWIDTH] IN BLOOD BY AUTOMATED COUNT: 13.1 % (ref 11.5–14.5)
EST. GFR  (AFRICAN AMERICAN): >60 ML/MIN/1.73 M^2
EST. GFR  (NON AFRICAN AMERICAN): >60 ML/MIN/1.73 M^2
GLUCOSE SERPL-MCNC: 79 MG/DL (ref 70–110)
HCT VFR BLD AUTO: 34.3 % (ref 37–48.5)
HGB BLD-MCNC: 10.9 G/DL (ref 12–16)
IMM GRANULOCYTES # BLD AUTO: 0.02 K/UL (ref 0–0.04)
IMM GRANULOCYTES NFR BLD AUTO: 0.4 % (ref 0–0.5)
INR PPP: 1.7 (ref 0.8–1.2)
LYMPHOCYTES # BLD AUTO: 1.2 K/UL (ref 1–4.8)
LYMPHOCYTES NFR BLD: 24.8 % (ref 18–48)
MCH RBC QN AUTO: 30.4 PG (ref 27–31)
MCHC RBC AUTO-ENTMCNC: 31.8 G/DL (ref 32–36)
MCV RBC AUTO: 96 FL (ref 82–98)
MONOCYTES # BLD AUTO: 0.4 K/UL (ref 0.3–1)
MONOCYTES NFR BLD: 8.9 % (ref 4–15)
NEUTROPHILS # BLD AUTO: 3 K/UL (ref 1.8–7.7)
NEUTROPHILS NFR BLD: 62.2 % (ref 38–73)
NRBC BLD-RTO: 0 /100 WBC
PLATELET # BLD AUTO: 174 K/UL (ref 150–450)
PMV BLD AUTO: 10.4 FL (ref 9.2–12.9)
POTASSIUM SERPL-SCNC: 4.1 MMOL/L (ref 3.5–5.1)
PROTHROMBIN TIME: 17.3 SEC (ref 9–12.5)
RBC # BLD AUTO: 3.59 M/UL (ref 4–5.4)
SODIUM SERPL-SCNC: 140 MMOL/L (ref 136–145)
WBC # BLD AUTO: 4.84 K/UL (ref 3.9–12.7)

## 2022-05-21 PROCEDURE — 93010 ELECTROCARDIOGRAM REPORT: CPT | Mod: ,,, | Performed by: INTERNAL MEDICINE

## 2022-05-21 PROCEDURE — 25000003 PHARM REV CODE 250: Performed by: INTERNAL MEDICINE

## 2022-05-21 PROCEDURE — 99233 PR SUBSEQUENT HOSPITAL CARE,LEVL III: ICD-10-PCS | Mod: GC,,, | Performed by: INTERNAL MEDICINE

## 2022-05-21 PROCEDURE — 93005 ELECTROCARDIOGRAM TRACING: CPT

## 2022-05-21 PROCEDURE — 36415 COLL VENOUS BLD VENIPUNCTURE: CPT | Performed by: HOSPITALIST

## 2022-05-21 PROCEDURE — 93010 EKG 12-LEAD: ICD-10-PCS | Mod: ,,, | Performed by: INTERNAL MEDICINE

## 2022-05-21 PROCEDURE — 80048 BASIC METABOLIC PNL TOTAL CA: CPT | Performed by: STUDENT IN AN ORGANIZED HEALTH CARE EDUCATION/TRAINING PROGRAM

## 2022-05-21 PROCEDURE — 25000003 PHARM REV CODE 250: Performed by: HOSPITALIST

## 2022-05-21 PROCEDURE — 85610 PROTHROMBIN TIME: CPT | Performed by: HOSPITALIST

## 2022-05-21 PROCEDURE — 20600001 HC STEP DOWN PRIVATE ROOM

## 2022-05-21 PROCEDURE — 63600175 PHARM REV CODE 636 W HCPCS: Performed by: INTERNAL MEDICINE

## 2022-05-21 PROCEDURE — 25000003 PHARM REV CODE 250: Performed by: STUDENT IN AN ORGANIZED HEALTH CARE EDUCATION/TRAINING PROGRAM

## 2022-05-21 PROCEDURE — 85025 COMPLETE CBC W/AUTO DIFF WBC: CPT | Performed by: STUDENT IN AN ORGANIZED HEALTH CARE EDUCATION/TRAINING PROGRAM

## 2022-05-21 PROCEDURE — 99233 SBSQ HOSP IP/OBS HIGH 50: CPT | Mod: GC,,, | Performed by: INTERNAL MEDICINE

## 2022-05-21 RX ORDER — SOTALOL HYDROCHLORIDE 80 MG/1
80 TABLET ORAL DAILY
Status: DISCONTINUED | OUTPATIENT
Start: 2022-05-21 | End: 2022-05-23 | Stop reason: HOSPADM

## 2022-05-21 RX ORDER — VALACYCLOVIR HYDROCHLORIDE 500 MG/1
1000 TABLET, FILM COATED ORAL DAILY
Status: DISCONTINUED | OUTPATIENT
Start: 2022-05-21 | End: 2022-05-22

## 2022-05-21 RX ADMIN — ZOLPIDEM TARTRATE 10 MG: 5 TABLET ORAL at 08:05

## 2022-05-21 RX ADMIN — VALACYCLOVIR HYDROCHLORIDE 1000 MG: 500 TABLET, FILM COATED ORAL at 12:05

## 2022-05-21 RX ADMIN — METOPROLOL SUCCINATE 100 MG: 100 TABLET, EXTENDED RELEASE ORAL at 08:05

## 2022-05-21 RX ADMIN — PREDNISOLONE ACETATE 1 DROP: 10 SUSPENSION/ DROPS OPHTHALMIC at 09:05

## 2022-05-21 RX ADMIN — Medication 6 MG: at 08:05

## 2022-05-21 RX ADMIN — PREDNISOLONE ACETATE 1 DROP: 10 SUSPENSION/ DROPS OPHTHALMIC at 08:05

## 2022-05-21 RX ADMIN — CEFTRIAXONE SODIUM 2 G: 2 INJECTION, SOLUTION INTRAVENOUS at 02:05

## 2022-05-21 RX ADMIN — PREDNISOLONE ACETATE 1 DROP: 10 SUSPENSION/ DROPS OPHTHALMIC at 12:05

## 2022-05-21 RX ADMIN — PREDNISOLONE ACETATE 1 DROP: 10 SUSPENSION/ DROPS OPHTHALMIC at 05:05

## 2022-05-21 RX ADMIN — SOTALOL HYDROCHLORIDE 80 MG: 80 TABLET ORAL at 09:05

## 2022-05-21 RX ADMIN — METOPROLOL SUCCINATE 50 MG: 50 TABLET, EXTENDED RELEASE ORAL at 09:05

## 2022-05-21 NOTE — ASSESSMENT & PLAN NOTE
61F with mechanical AoVR presented to clinic with several weeks of malaise and fevers, found to have +blood cultures and recommended for admission. She denies chest pain, shortness of breath, nausea, vomiting, or diarrhea.    - blood cultures 5/16 with strep mitis/oralis  - blood cultures 5/18 ngtd    - Vancomycin pharmacy consult to continue empiric therapy for Strep bacteremia  - ID consulted; recommend CTX  - TTE negative for veg  - CELSO pending; tentatively planned for 5/23 due to full schedule  - EPS and CTS following      Sources differentials - recent sinus infection patient reported, transient bacteremia if pt with gingival bleeding while bruising due to chronic anticoagulation.  Also with recent Cataract surgery, suspect pt had peripheral IV placed, patient unsure

## 2022-05-21 NOTE — PLAN OF CARE
Chart reviewed. No acute events overnight. BCx 5/18 NGTD.  --Awaiting CELSO.  --continue ceftriaxone 2g q 24h    Candy Dickson PGY-5  Infectious Disease

## 2022-05-21 NOTE — PROGRESS NOTES
[facility] - Telemetry [facility] Medicine  Progress Note    Patient Name: Naina Keyes  MRN: [id number]  Patient Class: IP- Inpatient   Admission Date: 5/18/2022  Length of Stay: 3 days  Attending Physician: Jaz Vicente MD  Primary Care Provider: Anjana Torres NP        Subjective:     Principal Problem:Streptococcal bacteremia        HPI:  TRANSFER CENTER PHYSICIAN SUMMARY  Patient is a 61 y.o. female who has a past medical history of Hypertension, s/p Pacemaker, S/P Mechanical AoVR, S/P thoracic aortic aneurysm repair with Dacron graft (2007), and S/P VSD surgical patch repair 1997 presented with to [facility] ED for abnormal lab results. Patient had one year follow up with interventional cardiology yesterday where she complained of shortness of breath and fevers. She had labs drawn including blood cultures. 5/17 blood cultures returned positive for GPC in chains. Patient advised to go to local ED for further management. On arrival to ED work up unrevealing. Blood cultures have been repeated. She was started on IV Vancomycin. Vitals stable. Facility seeking transfer for ID and cardiology evaluation. Stable for transfer.      BEDSIDE EVAL  Seen in cardiology clinic on 5/16 - had c/o of fevers, fatigue, malaise and blood cultures were sent, ECHO completed and patient notified on 5/17 of positive blood culture results, she was advised to seek care,  ID staff Dr. Frieda Gutierrez requested patient transfer to [facility] from [facility] ED.     Patient reports that 6-8 weeks ago she had a bad sinus infection that transitioned to itchy/watery eyes and had a persistent non-productive cough, overall hasn't felt well since this time, her fevers have been sporadic with progressively worsening fatigue prior to her clinic appointment 5/16.  She has been adherent to home medications including her anti-hypertensives.      She notes gingival bleeding when brushing her teeth, unsure of her last INR,  chronically on Coumadin.  She had Right Eye Cataract surgery on 05/06/22, supposed to have Left Eye completed this week.  No dental procedures, denies any skin changes or rash.   She has hx of Mechanical aortic valve, Pacemaker placement (98% paced) and has Left knee arthroplasty       Overview/Hospital Course:  61F with extensive cardiac history, including pacemaker, s/p mechanical AoVR, s/p thoracic aortic aneurysm repair, and s/p VSD patch repair. She is here with streptococcal bacteremia (+cultures 5/16) after several weeks of fatigue and undulating fevers. EPS, ID, and CTS consulted - see notes; no surgery planned. She was started on vanc; culture resulted strep mitis pan-sensitive so de-escalated to ceftriaxone. Cultures cleared on 5/18. TTE negative for vegetation, CELSO pending.      Interval History: no acute events overnight    Review of Systems   Constitutional:  Negative for chills, fatigue and fever.   HENT:  Negative for sore throat and trouble swallowing.    Eyes:  Negative for photophobia.   Respiratory:  Negative for cough, shortness of breath and wheezing.    Cardiovascular:  Negative for chest pain, palpitations and leg swelling.   Gastrointestinal:  Negative for nausea and vomiting.   Genitourinary:  Negative for dysuria.   Musculoskeletal:  Negative for arthralgias, back pain and gait problem.   Skin:  Negative for rash.   Neurological:  Negative for dizziness and headaches.   Psychiatric/Behavioral:  Negative for confusion.    Objective:     Vital Signs (Most Recent):  Temp: 97.2 °F (36.2 °C) (05/21/22 1209)  Pulse: 65 (05/21/22 1209)  Resp: 17 (05/21/22 1209)  BP: (!) 106/59 (05/21/22 1209)  SpO2: 95 % (05/21/22 1209)   Vital Signs (24h Range):  Temp:  [97.2 °F (36.2 °C)-98.6 °F (37 °C)] 97.2 °F (36.2 °C)  Pulse:  [65-74] 65  Resp:  [16-21] 17  SpO2:  [95 %-100 %] 95 %  BP: (106-133)/(56-70) 106/59     Weight: 58.5 kg (128 lb 15.5 oz)  Body mass index is 22.85 kg/m².  No intake or output data in  the 24 hours ending 05/21/22 1239   Physical Exam  Vitals and nursing note reviewed.   Constitutional:       General: She is not in acute distress.     Appearance: Normal appearance. She is not ill-appearing or toxic-appearing.   HENT:      Head: Normocephalic and atraumatic.      Right Ear: External ear normal.      Left Ear: External ear normal.      Nose: Nose normal.      Mouth/Throat:      Mouth: Mucous membranes are moist.      Pharynx: Oropharynx is clear.   Eyes:      Extraocular Movements: Extraocular movements intact.   Cardiovascular:      Rate and Rhythm: Normal rate.      Pulses: Normal pulses.      Heart sounds: Murmur heard.      Comments: Mid-systolic click  Systolic ejection murmur  Pulmonary:      Effort: Pulmonary effort is normal. No respiratory distress.      Breath sounds: Normal breath sounds. No wheezing.   Abdominal:      General: Abdomen is flat. Bowel sounds are normal. There is no distension.      Palpations: Abdomen is soft.      Tenderness: There is no abdominal tenderness.   Musculoskeletal:         General: No swelling.      Cervical back: Neck supple. No tenderness.      Right lower leg: No edema.      Left lower leg: No edema.   Lymphadenopathy:      Cervical: No cervical adenopathy.   Skin:     Findings: No bruising or rash.      Comments: No osler nodes, janeway lesions, or splinter hemorrhages   Neurological:      General: No focal deficit present.      Mental Status: She is alert and oriented to person, place, and time.      Motor: No weakness.   Psychiatric:         Mood and Affect: Mood normal.         Thought Content: Thought content normal.       Significant Labs: All pertinent labs within the past 24 hours have been reviewed.    Significant Imaging: I have reviewed all pertinent imaging results/findings within the past 24 hours.      Assessment/Plan:      * Streptococcal bacteremia  61F with mechanical AoVR presented to clinic with several weeks of malaise and fevers, found  to have +blood cultures and recommended for admission. She denies chest pain, shortness of breath, nausea, vomiting, or diarrhea.    - blood cultures 5/16 with strep mitis/oralis  - blood cultures 5/18 ngtd    - Vancomycin pharmacy consult to continue empiric therapy for Strep bacteremia  - ID consulted; recommend CTX  - TTE negative for veg  - CELSO pending; tentatively planned for 5/23 due to full schedule  - EPS and CTS following      Sources differentials - recent sinus infection patient reported, transient bacteremia if pt with gingival bleeding while bruising due to chronic anticoagulation.  Also with recent Cataract surgery, suspect pt had peripheral IV placed, patient unsure    Age-related cataract of both eyes  S/p right cataract/lens surgery on 5/6 - receiving Steroid eye drops Pred-forte 4 times daily - continue for now  -left eye was to be this week, will be delayed for time being.     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

## 2022-05-21 NOTE — SUBJECTIVE & OBJECTIVE
Interval History: no acute events overnight    Review of Systems   Constitutional:  Negative for chills, fatigue and fever.   HENT:  Negative for sore throat and trouble swallowing.    Eyes:  Negative for photophobia.   Respiratory:  Negative for cough, shortness of breath and wheezing.    Cardiovascular:  Negative for chest pain, palpitations and leg swelling.   Gastrointestinal:  Negative for nausea and vomiting.   Genitourinary:  Negative for dysuria.   Musculoskeletal:  Negative for arthralgias, back pain and gait problem.   Skin:  Negative for rash.   Neurological:  Negative for dizziness and headaches.   Psychiatric/Behavioral:  Negative for confusion.    Objective:     Vital Signs (Most Recent):  Temp: 97.2 °F (36.2 °C) (05/21/22 1209)  Pulse: 65 (05/21/22 1209)  Resp: 17 (05/21/22 1209)  BP: (!) 106/59 (05/21/22 1209)  SpO2: 95 % (05/21/22 1209)   Vital Signs (24h Range):  Temp:  [97.2 °F (36.2 °C)-98.6 °F (37 °C)] 97.2 °F (36.2 °C)  Pulse:  [65-74] 65  Resp:  [16-21] 17  SpO2:  [95 %-100 %] 95 %  BP: (106-133)/(56-70) 106/59     Weight: 58.5 kg (128 lb 15.5 oz)  Body mass index is 22.85 kg/m².  No intake or output data in the 24 hours ending 05/21/22 1239   Physical Exam  Vitals and nursing note reviewed.   Constitutional:       General: She is not in acute distress.     Appearance: Normal appearance. She is not ill-appearing or toxic-appearing.   HENT:      Head: Normocephalic and atraumatic.      Right Ear: External ear normal.      Left Ear: External ear normal.      Nose: Nose normal.      Mouth/Throat:      Mouth: Mucous membranes are moist.      Pharynx: Oropharynx is clear.   Eyes:      Extraocular Movements: Extraocular movements intact.   Cardiovascular:      Rate and Rhythm: Normal rate.      Pulses: Normal pulses.      Heart sounds: Murmur heard.      Comments: Mid-systolic click  Systolic ejection murmur  Pulmonary:      Effort: Pulmonary effort is normal. No respiratory distress.      Breath  sounds: Normal breath sounds. No wheezing.   Abdominal:      General: Abdomen is flat. Bowel sounds are normal. There is no distension.      Palpations: Abdomen is soft.      Tenderness: There is no abdominal tenderness.   Musculoskeletal:         General: No swelling.      Cervical back: Neck supple. No tenderness.      Right lower leg: No edema.      Left lower leg: No edema.   Lymphadenopathy:      Cervical: No cervical adenopathy.   Skin:     Findings: No bruising or rash.      Comments: No osler nodes, janeway lesions, or splinter hemorrhages   Neurological:      General: No focal deficit present.      Mental Status: She is alert and oriented to person, place, and time.      Motor: No weakness.   Psychiatric:         Mood and Affect: Mood normal.         Thought Content: Thought content normal.       Significant Labs: All pertinent labs within the past 24 hours have been reviewed.    Significant Imaging: I have reviewed all pertinent imaging results/findings within the past 24 hours.

## 2022-05-21 NOTE — ASSESSMENT & PLAN NOTE
On warfarin 5mg daily   - check daily INR  - given that patient may require diagnostic or other invasive procedures this admit   - switch to therapeutic lovenox 1mg/kg [id number] when INR becomes sub-therapeutic

## 2022-05-22 LAB
ANION GAP SERPL CALC-SCNC: 9 MMOL/L (ref 8–16)
BASOPHILS # BLD AUTO: 0.04 K/UL (ref 0–0.2)
BASOPHILS NFR BLD: 0.7 % (ref 0–1.9)
BUN SERPL-MCNC: 13 MG/DL (ref 8–23)
CALCIUM SERPL-MCNC: 9.2 MG/DL (ref 8.7–10.5)
CHLORIDE SERPL-SCNC: 107 MMOL/L (ref 95–110)
CO2 SERPL-SCNC: 26 MMOL/L (ref 23–29)
CREAT SERPL-MCNC: 0.8 MG/DL (ref 0.5–1.4)
DIFFERENTIAL METHOD: ABNORMAL
EOSINOPHIL # BLD AUTO: 0.1 K/UL (ref 0–0.5)
EOSINOPHIL NFR BLD: 2.3 % (ref 0–8)
ERYTHROCYTE [DISTWIDTH] IN BLOOD BY AUTOMATED COUNT: 13.3 % (ref 11.5–14.5)
EST. GFR  (AFRICAN AMERICAN): >60 ML/MIN/1.73 M^2
EST. GFR  (NON AFRICAN AMERICAN): >60 ML/MIN/1.73 M^2
GLUCOSE SERPL-MCNC: 77 MG/DL (ref 70–110)
HCT VFR BLD AUTO: 36.4 % (ref 37–48.5)
HGB BLD-MCNC: 11.5 G/DL (ref 12–16)
IMM GRANULOCYTES # BLD AUTO: 0.03 K/UL (ref 0–0.04)
IMM GRANULOCYTES NFR BLD AUTO: 0.5 % (ref 0–0.5)
INR PPP: 1.4 (ref 0.8–1.2)
LYMPHOCYTES # BLD AUTO: 1 K/UL (ref 1–4.8)
LYMPHOCYTES NFR BLD: 17.6 % (ref 18–48)
MCH RBC QN AUTO: 30.6 PG (ref 27–31)
MCHC RBC AUTO-ENTMCNC: 31.6 G/DL (ref 32–36)
MCV RBC AUTO: 97 FL (ref 82–98)
MONOCYTES # BLD AUTO: 0.5 K/UL (ref 0.3–1)
MONOCYTES NFR BLD: 8.5 % (ref 4–15)
NEUTROPHILS # BLD AUTO: 4 K/UL (ref 1.8–7.7)
NEUTROPHILS NFR BLD: 70.4 % (ref 38–73)
NRBC BLD-RTO: 0 /100 WBC
PLATELET # BLD AUTO: 187 K/UL (ref 150–450)
PMV BLD AUTO: 10.2 FL (ref 9.2–12.9)
POCT GLUCOSE: 75 MG/DL (ref 70–110)
POTASSIUM SERPL-SCNC: 4.6 MMOL/L (ref 3.5–5.1)
PROTHROMBIN TIME: 14 SEC (ref 9–12.5)
RBC # BLD AUTO: 3.76 M/UL (ref 4–5.4)
SODIUM SERPL-SCNC: 142 MMOL/L (ref 136–145)
WBC # BLD AUTO: 5.63 K/UL (ref 3.9–12.7)

## 2022-05-22 PROCEDURE — 63600175 PHARM REV CODE 636 W HCPCS: Performed by: INTERNAL MEDICINE

## 2022-05-22 PROCEDURE — 63600175 PHARM REV CODE 636 W HCPCS: Performed by: STUDENT IN AN ORGANIZED HEALTH CARE EDUCATION/TRAINING PROGRAM

## 2022-05-22 PROCEDURE — 93005 ELECTROCARDIOGRAM TRACING: CPT

## 2022-05-22 PROCEDURE — 99233 SBSQ HOSP IP/OBS HIGH 50: CPT | Mod: GC,,, | Performed by: INTERNAL MEDICINE

## 2022-05-22 PROCEDURE — 25000003 PHARM REV CODE 250: Performed by: INTERNAL MEDICINE

## 2022-05-22 PROCEDURE — 93010 ELECTROCARDIOGRAM REPORT: CPT | Mod: ,,, | Performed by: INTERNAL MEDICINE

## 2022-05-22 PROCEDURE — 80048 BASIC METABOLIC PNL TOTAL CA: CPT | Performed by: STUDENT IN AN ORGANIZED HEALTH CARE EDUCATION/TRAINING PROGRAM

## 2022-05-22 PROCEDURE — 85610 PROTHROMBIN TIME: CPT | Performed by: HOSPITALIST

## 2022-05-22 PROCEDURE — 20600001 HC STEP DOWN PRIVATE ROOM

## 2022-05-22 PROCEDURE — 25000003 PHARM REV CODE 250: Performed by: HOSPITALIST

## 2022-05-22 PROCEDURE — 25000003 PHARM REV CODE 250: Performed by: STUDENT IN AN ORGANIZED HEALTH CARE EDUCATION/TRAINING PROGRAM

## 2022-05-22 PROCEDURE — 36415 COLL VENOUS BLD VENIPUNCTURE: CPT | Performed by: HOSPITALIST

## 2022-05-22 PROCEDURE — 99233 PR SUBSEQUENT HOSPITAL CARE,LEVL III: ICD-10-PCS | Mod: GC,,, | Performed by: INTERNAL MEDICINE

## 2022-05-22 PROCEDURE — 85025 COMPLETE CBC W/AUTO DIFF WBC: CPT | Performed by: STUDENT IN AN ORGANIZED HEALTH CARE EDUCATION/TRAINING PROGRAM

## 2022-05-22 PROCEDURE — 93010 EKG 12-LEAD: ICD-10-PCS | Mod: ,,, | Performed by: INTERNAL MEDICINE

## 2022-05-22 RX ORDER — ENOXAPARIN SODIUM 100 MG/ML
1 INJECTION SUBCUTANEOUS EVERY 12 HOURS
Status: COMPLETED | OUTPATIENT
Start: 2022-05-22 | End: 2022-05-22

## 2022-05-22 RX ORDER — VALACYCLOVIR HYDROCHLORIDE 500 MG/1
1000 TABLET, FILM COATED ORAL 2 TIMES DAILY
Status: DISCONTINUED | OUTPATIENT
Start: 2022-05-22 | End: 2022-05-23 | Stop reason: HOSPADM

## 2022-05-22 RX ADMIN — Medication 6 MG: at 09:05

## 2022-05-22 RX ADMIN — VALACYCLOVIR HYDROCHLORIDE 1000 MG: 500 TABLET, FILM COATED ORAL at 09:05

## 2022-05-22 RX ADMIN — CEFTRIAXONE SODIUM 2 G: 2 INJECTION, SOLUTION INTRAVENOUS at 02:05

## 2022-05-22 RX ADMIN — SOTALOL HYDROCHLORIDE 80 MG: 80 TABLET ORAL at 09:05

## 2022-05-22 RX ADMIN — PREDNISOLONE ACETATE 1 DROP: 10 SUSPENSION/ DROPS OPHTHALMIC at 09:05

## 2022-05-22 RX ADMIN — METOPROLOL SUCCINATE 50 MG: 50 TABLET, EXTENDED RELEASE ORAL at 09:05

## 2022-05-22 RX ADMIN — ZOLPIDEM TARTRATE 10 MG: 5 TABLET ORAL at 09:05

## 2022-05-22 RX ADMIN — ENOXAPARIN SODIUM 60 MG: 100 INJECTION SUBCUTANEOUS at 09:05

## 2022-05-22 RX ADMIN — PREDNISOLONE ACETATE 1 DROP: 10 SUSPENSION/ DROPS OPHTHALMIC at 01:05

## 2022-05-22 RX ADMIN — PREDNISOLONE ACETATE 1 DROP: 10 SUSPENSION/ DROPS OPHTHALMIC at 05:05

## 2022-05-22 RX ADMIN — METOPROLOL SUCCINATE 100 MG: 100 TABLET, EXTENDED RELEASE ORAL at 09:05

## 2022-05-22 NOTE — PROGRESS NOTES
[facility] - Telemetry [facility] Medicine  Progress Note    Patient Name: Naina Keyes  MRN: [id number]  Patient Class: IP- Inpatient   Admission Date: 5/18/2022  Length of Stay: 4 days  Attending Physician: Jaz Vicente MD  Primary Care Provider: Anjana Torres NP        Subjective:     Principal Problem:Streptococcal bacteremia        HPI:  TRANSFER CENTER PHYSICIAN SUMMARY  Patient is a 61 y.o. female who has a past medical history of Hypertension, s/p Pacemaker, S/P Mechanical AoVR, S/P thoracic aortic aneurysm repair with Dacron graft (2007), and S/P VSD surgical patch repair 1997 presented with to [facility] ED for abnormal lab results. Patient had one year follow up with interventional cardiology yesterday where she complained of shortness of breath and fevers. She had labs drawn including blood cultures. 5/17 blood cultures returned positive for GPC in chains. Patient advised to go to local ED for further management. On arrival to ED work up unrevealing. Blood cultures have been repeated. She was started on IV Vancomycin. Vitals stable. Facility seeking transfer for ID and cardiology evaluation. Stable for transfer.      BEDSIDE EVAL  Seen in cardiology clinic on 5/16 - had c/o of fevers, fatigue, malaise and blood cultures were sent, ECHO completed and patient notified on 5/17 of positive blood culture results, she was advised to seek care,  ID staff Dr. Frieda Gutierrez requested patient transfer to [facility] from [facility] ED.     Patient reports that 6-8 weeks ago she had a bad sinus infection that transitioned to itchy/watery eyes and had a persistent non-productive cough, overall hasn't felt well since this time, her fevers have been sporadic with progressively worsening fatigue prior to her clinic appointment 5/16.  She has been adherent to home medications including her anti-hypertensives.      She notes gingival bleeding when brushing her teeth, unsure of her last INR,  chronically on Coumadin.  She had Right Eye Cataract surgery on 05/06/22, supposed to have Left Eye completed this week.  No dental procedures, denies any skin changes or rash.   She has hx of Mechanical aortic valve, Pacemaker placement (98% paced) and has Left knee arthroplasty       Overview/Hospital Course:  61F with extensive cardiac history, including pacemaker, s/p mechanical AoVR, s/p thoracic aortic aneurysm repair, and s/p VSD patch repair. She is here with streptococcal bacteremia (+cultures 5/16) after several weeks of fatigue and undulating fevers. EPS, ID, and CTS consulted - see notes; no surgery planned. She was started on vanc; culture resulted strep mitis pan-sensitive so de-escalated to ceftriaxone. Cultures cleared on 5/18. TTE negative for vegetation, CELSO pending to determine antibiotic course.      Interval History: NAEON. Restart therapeutic lovenox today given subtherapeutic INR; hold tomorrow AM dose and NPO at midnight for CELSO.    Review of Systems   Constitutional:  Negative for chills, fatigue and fever.   HENT:  Negative for sore throat and trouble swallowing.    Eyes:  Negative for photophobia.   Respiratory:  Negative for cough, shortness of breath and wheezing.    Cardiovascular:  Negative for chest pain, palpitations and leg swelling.   Gastrointestinal:  Negative for nausea and vomiting.   Genitourinary:  Negative for dysuria.   Musculoskeletal:  Negative for arthralgias, back pain and gait problem.   Skin:  Negative for rash.   Neurological:  Negative for dizziness and headaches.   Psychiatric/Behavioral:  Negative for confusion.    Objective:     Vital Signs (Most Recent):  Temp: 98.1 °F (36.7 °C) (05/22/22 0727)  Pulse: 68 (05/22/22 1106)  Resp: 16 (05/22/22 0727)  BP: 130/67 (05/22/22 0727)  SpO2: 98 % (05/22/22 0727) Vital Signs (24h Range):  Temp:  [96.4 °F (35.8 °C)-98.1 °F (36.7 °C)] 98.1 °F (36.7 °C)  Pulse:  [64-83] 68  Resp:  [15-21] 16  SpO2:  [95 %-100 %] 98 %  BP:  ()/(51-72) 130/67     Weight: 59.1 kg (130 lb 4.7 oz)  Body mass index is 23.08 kg/m².    Intake/Output Summary (Last 24 hours) at 5/22/2022 1114  Last data filed at 5/22/2022 0700  Gross per 24 hour   Intake 400 ml   Output --   Net 400 ml      Physical Exam  Vitals and nursing note reviewed.   Constitutional:       General: She is not in acute distress.     Appearance: Normal appearance. She is not ill-appearing or toxic-appearing.   HENT:      Head: Normocephalic and atraumatic.      Right Ear: External ear normal.      Left Ear: External ear normal.      Nose: Nose normal.      Mouth/Throat:      Mouth: Mucous membranes are moist.      Pharynx: Oropharynx is clear.   Eyes:      Extraocular Movements: Extraocular movements intact.   Cardiovascular:      Rate and Rhythm: Normal rate.      Pulses: Normal pulses.      Heart sounds: Murmur heard.      Comments: Mid-systolic click  Systolic ejection murmur  Pulmonary:      Effort: Pulmonary effort is normal. No respiratory distress.      Breath sounds: Normal breath sounds. No wheezing.   Abdominal:      General: Abdomen is flat. Bowel sounds are normal. There is no distension.      Palpations: Abdomen is soft.      Tenderness: There is no abdominal tenderness.   Musculoskeletal:         General: No swelling.      Cervical back: Neck supple. No tenderness.      Right lower leg: No edema.      Left lower leg: No edema.   Lymphadenopathy:      Cervical: No cervical adenopathy.   Skin:     Findings: No bruising or rash.      Comments: No osler nodes, janeway lesions, or splinter hemorrhages   Neurological:      General: No focal deficit present.      Mental Status: She is alert and oriented to person, place, and time.      Motor: No weakness.   Psychiatric:         Mood and Affect: Mood normal.         Thought Content: Thought content normal.       Significant Labs: All pertinent labs within the past 24 hours have been reviewed.    Significant Imaging: I have  reviewed all pertinent imaging results/findings within the past 24 hours.      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 She denies chest pain, shortness of breath, nausea, vomiting, or diarrhea.    - blood cultures 5/16 with strep mitis/oralis  - blood cultures 5/18 ngtd    - Vancomycin pharmacy consult to continue empiric therapy for Strep bacteremia  - ID consulted; recommend CTX as resulted pan-sensitive  - TTE negative for veg  - CELSO pending; tentatively planned for 5/23 due to full schedule - will determine length of abx course given +/- vegetations  - EPS and CTS following; no surgical intervention indicated - see notes for details      Sources differentials - recent sinus infection patient reported, transient bacteremia if pt with gingival bleeding while bruising due to chronic anticoagulation.  Also with recent Cataract surgery, suspect pt had peripheral IV placed, patient unsure    Age-related cataract of both eyes  S/p right cataract/lens surgery on 5/6 - receiving Steroid eye drops Pred-forte 4 times daily - continue for now  -left eye was to be this week, will be delayed for time being.     Pt offered eye shield due to photophobia, declines at this time      Warfarin anticoagulation  On warfarin 5mg daily   - check daily INR  - given that patient may require diagnostic or other invasive procedures this admit   - switch to therapeutic lovenox 1mg/kg [id number] when INR becomes sub-therapeutic  - restart warfarin after CELSO as no other interventions planned; bridge if necessary      Pacemaker  Patient reports chronically paced  Need to evaluate for infected leads  - EP consult to review and discuss options with  patient  - No intervention given that pacemaker was placed in 1997 and leads cannot safely be exchanged  - CELSO pending; will likely be 5/23            VTE Risk Mitigation (From admission, onward)         Ordered     enoxaparin injection 60 mg  Every 12 hours         05/22/22 0815     IP VTE LOW RISK PATIENT  Once         05/18/22 2129     Place sequential compression device  Until discontinued         05/18/22 2129                Discharge Planning   OFELIA: 5/24/2022     Code Status: Full Code   Is the patient medically ready for discharge?: No    Reason for patient still in hospital (select all that apply): Patient trending condition, Treatment, Imaging, Consult recommendations and Pending disposition  Discharge Plan A: Home with family                  Melonie Rosenberg DO  Department of Hospital Medicine   [facility] - Telemetry Stepdown

## 2022-05-22 NOTE — ASSESSMENT & PLAN NOTE
On warfarin 5mg daily   - check daily INR  - given that patient may require diagnostic or other invasive procedures this admit   - switch to therapeutic lovenox 1mg/kg [id number] when INR becomes sub-therapeutic  - restart warfarin after CELSO as no other interventions planned; bridge if necessary

## 2022-05-22 NOTE — ASSESSMENT & PLAN NOTE
Patient reports chronically paced  Need to evaluate for infected leads  - EP consult to review and discuss options with patient  - No intervention given that pacemaker was placed in 1997 and leads cannot safely be exchanged  - CELSO pending; will likely be 5/23

## 2022-05-22 NOTE — PLAN OF CARE
Problem: Adult Inpatient Plan of Care  Goal: Plan of Care Review  Outcome: Ongoing, Progressing  Goal: Patient-Specific Goal (Individualized)  Outcome: Ongoing, Progressing  Goal: Absence of Hospital-Acquired Illness or Injury  Outcome: Ongoing, Progressing  Goal: Optimal Comfort and Wellbeing  Outcome: Ongoing, Progressing  Goal: Readiness for Transition of Care  Outcome: Ongoing, Progressing     Problem: Infection  Goal: Absence of Infection Signs and Symptoms  Outcome: Ongoing, Progressing     Problem: Fall Injury Risk  Goal: Absence of Fall and Fall-Related Injury  Outcome: Ongoing, Progressing   Pt stable and calm through out shift, no changes at this time

## 2022-05-22 NOTE — ASSESSMENT & PLAN NOTE
61F with mechanical AoVR presented to clinic with several weeks of malaise and fevers, found to have +blood cultures and recommended for admission. She denies chest pain, shortness of breath, nausea, vomiting, or diarrhea.    - blood cultures 5/16 with strep mitis/oralis  - blood cultures 5/18 ngtd    - Vancomycin pharmacy consult to continue empiric therapy for Strep bacteremia  - ID consulted; recommend CTX as resulted pan-sensitive  - TTE negative for veg  - CELSO pending; tentatively planned for 5/23 due to full schedule - will determine length of abx course given +/- vegetations  - EPS and CTS following; no surgical intervention indicated - see notes for details      Sources differentials - recent sinus infection patient reported, transient bacteremia if pt with gingival bleeding while bruising due to chronic anticoagulation.  Also with recent Cataract surgery, suspect pt had peripheral IV placed, patient unsure

## 2022-05-22 NOTE — SUBJECTIVE & OBJECTIVE
Interval History: NAEON. Restart therapeutic lovenox today given subtherapeutic INR; hold tomorrow AM dose and NPO at midnight for CELSO.    Review of Systems   Constitutional:  Negative for chills, fatigue and fever.   HENT:  Negative for sore throat and trouble swallowing.    Eyes:  Negative for photophobia.   Respiratory:  Negative for cough, shortness of breath and wheezing.    Cardiovascular:  Negative for chest pain, palpitations and leg swelling.   Gastrointestinal:  Negative for nausea and vomiting.   Genitourinary:  Negative for dysuria.   Musculoskeletal:  Negative for arthralgias, back pain and gait problem.   Skin:  Negative for rash.   Neurological:  Negative for dizziness and headaches.   Psychiatric/Behavioral:  Negative for confusion.    Objective:     Vital Signs (Most Recent):  Temp: 98.1 °F (36.7 °C) (05/22/22 0727)  Pulse: 68 (05/22/22 1106)  Resp: 16 (05/22/22 0727)  BP: 130/67 (05/22/22 0727)  SpO2: 98 % (05/22/22 0727) Vital Signs (24h Range):  Temp:  [96.4 °F (35.8 °C)-98.1 °F (36.7 °C)] 98.1 °F (36.7 °C)  Pulse:  [64-83] 68  Resp:  [15-21] 16  SpO2:  [95 %-100 %] 98 %  BP: ()/(51-72) 130/67     Weight: 59.1 kg (130 lb 4.7 oz)  Body mass index is 23.08 kg/m².    Intake/Output Summary (Last 24 hours) at 5/22/2022 1114  Last data filed at 5/22/2022 0700  Gross per 24 hour   Intake 400 ml   Output --   Net 400 ml      Physical Exam  Vitals and nursing note reviewed.   Constitutional:       General: She is not in acute distress.     Appearance: Normal appearance. She is not ill-appearing or toxic-appearing.   HENT:      Head: Normocephalic and atraumatic.      Right Ear: External ear normal.      Left Ear: External ear normal.      Nose: Nose normal.      Mouth/Throat:      Mouth: Mucous membranes are moist.      Pharynx: Oropharynx is clear.   Eyes:      Extraocular Movements: Extraocular movements intact.   Cardiovascular:      Rate and Rhythm: Normal rate.      Pulses: Normal pulses.       Heart sounds: Murmur heard.      Comments: Mid-systolic click  Systolic ejection murmur  Pulmonary:      Effort: Pulmonary effort is normal. No respiratory distress.      Breath sounds: Normal breath sounds. No wheezing.   Abdominal:      General: Abdomen is flat. Bowel sounds are normal. There is no distension.      Palpations: Abdomen is soft.      Tenderness: There is no abdominal tenderness.   Musculoskeletal:         General: No swelling.      Cervical back: Neck supple. No tenderness.      Right lower leg: No edema.      Left lower leg: No edema.   Lymphadenopathy:      Cervical: No cervical adenopathy.   Skin:     Findings: No bruising or rash.      Comments: No osler nodes, janeway lesions, or splinter hemorrhages   Neurological:      General: No focal deficit present.      Mental Status: She is alert and oriented to person, place, and time.      Motor: No weakness.   Psychiatric:         Mood and Affect: Mood normal.         Thought Content: Thought content normal.       Significant Labs: All pertinent labs within the past 24 hours have been reviewed.    Significant Imaging: I have reviewed all pertinent imaging results/findings within the past 24 hours.

## 2022-05-23 ENCOUNTER — TELEPHONE (OUTPATIENT)
Dept: INTERNAL MEDICINE | Facility: CLINIC | Age: 62
End: 2022-05-23
Payer: MEDICARE

## 2022-05-23 VITALS
BODY MASS INDEX: 23.09 KG/M2 | HEART RATE: 70 BPM | TEMPERATURE: 98 F | DIASTOLIC BLOOD PRESSURE: 71 MMHG | OXYGEN SATURATION: 98 % | HEIGHT: 63 IN | WEIGHT: 130.31 LBS | SYSTOLIC BLOOD PRESSURE: 149 MMHG | RESPIRATION RATE: 18 BRPM

## 2022-05-23 PROBLEM — B00.1 COLD SORE: Status: ACTIVE | Noted: 2022-05-23

## 2022-05-23 LAB
ANION GAP SERPL CALC-SCNC: 5 MMOL/L (ref 8–16)
BASOPHILS # BLD AUTO: 0.05 K/UL (ref 0–0.2)
BASOPHILS NFR BLD: 0.9 % (ref 0–1.9)
BUN SERPL-MCNC: 14 MG/DL (ref 8–23)
CALCIUM SERPL-MCNC: 9.1 MG/DL (ref 8.7–10.5)
CHLORIDE SERPL-SCNC: 107 MMOL/L (ref 95–110)
CO2 SERPL-SCNC: 28 MMOL/L (ref 23–29)
CREAT SERPL-MCNC: 0.8 MG/DL (ref 0.5–1.4)
DIFFERENTIAL METHOD: ABNORMAL
EOSINOPHIL # BLD AUTO: 0.2 K/UL (ref 0–0.5)
EOSINOPHIL NFR BLD: 3.3 % (ref 0–8)
ERYTHROCYTE [DISTWIDTH] IN BLOOD BY AUTOMATED COUNT: 13.1 % (ref 11.5–14.5)
EST. GFR  (AFRICAN AMERICAN): >60 ML/MIN/1.73 M^2
EST. GFR  (NON AFRICAN AMERICAN): >60 ML/MIN/1.73 M^2
GLUCOSE SERPL-MCNC: 93 MG/DL (ref 70–110)
HCT VFR BLD AUTO: 35.1 % (ref 37–48.5)
HGB BLD-MCNC: 11.3 G/DL (ref 12–16)
IMM GRANULOCYTES # BLD AUTO: 0.02 K/UL (ref 0–0.04)
IMM GRANULOCYTES NFR BLD AUTO: 0.3 % (ref 0–0.5)
INR PPP: 1.2 (ref 0.8–1.2)
LYMPHOCYTES # BLD AUTO: 1.4 K/UL (ref 1–4.8)
LYMPHOCYTES NFR BLD: 23.9 % (ref 18–48)
MCH RBC QN AUTO: 31.4 PG (ref 27–31)
MCHC RBC AUTO-ENTMCNC: 32.2 G/DL (ref 32–36)
MCV RBC AUTO: 98 FL (ref 82–98)
MONOCYTES # BLD AUTO: 0.5 K/UL (ref 0.3–1)
MONOCYTES NFR BLD: 8.7 % (ref 4–15)
NEUTROPHILS # BLD AUTO: 3.6 K/UL (ref 1.8–7.7)
NEUTROPHILS NFR BLD: 62.9 % (ref 38–73)
NRBC BLD-RTO: 0 /100 WBC
PLATELET # BLD AUTO: 189 K/UL (ref 150–450)
PMV BLD AUTO: 10.1 FL (ref 9.2–12.9)
POTASSIUM SERPL-SCNC: 4.5 MMOL/L (ref 3.5–5.1)
PROTHROMBIN TIME: 12.5 SEC (ref 9–12.5)
RBC # BLD AUTO: 3.6 M/UL (ref 4–5.4)
SARS-COV-2 RDRP RESP QL NAA+PROBE: NEGATIVE
SODIUM SERPL-SCNC: 140 MMOL/L (ref 136–145)
WBC # BLD AUTO: 5.77 K/UL (ref 3.9–12.7)

## 2022-05-23 PROCEDURE — 25000003 PHARM REV CODE 250: Performed by: INTERNAL MEDICINE

## 2022-05-23 PROCEDURE — 93010 ELECTROCARDIOGRAM REPORT: CPT | Mod: ,,, | Performed by: INTERNAL MEDICINE

## 2022-05-23 PROCEDURE — 36573 INSJ PICC RS&I 5 YR+: CPT

## 2022-05-23 PROCEDURE — 93005 ELECTROCARDIOGRAM TRACING: CPT

## 2022-05-23 PROCEDURE — 80048 BASIC METABOLIC PNL TOTAL CA: CPT | Performed by: STUDENT IN AN ORGANIZED HEALTH CARE EDUCATION/TRAINING PROGRAM

## 2022-05-23 PROCEDURE — 93010 EKG 12-LEAD: ICD-10-PCS | Mod: ,,, | Performed by: INTERNAL MEDICINE

## 2022-05-23 PROCEDURE — 1111F PR DISCHARGE MEDS RECONCILED W/ CURRENT OUTPATIENT MED LIST: ICD-10-PCS | Mod: CPTII,,, | Performed by: INTERNAL MEDICINE

## 2022-05-23 PROCEDURE — 99239 HOSP IP/OBS DSCHRG MGMT >30: CPT | Mod: ,,, | Performed by: INTERNAL MEDICINE

## 2022-05-23 PROCEDURE — 85025 COMPLETE CBC W/AUTO DIFF WBC: CPT | Performed by: STUDENT IN AN ORGANIZED HEALTH CARE EDUCATION/TRAINING PROGRAM

## 2022-05-23 PROCEDURE — 76937 US GUIDE VASCULAR ACCESS: CPT

## 2022-05-23 PROCEDURE — C1751 CATH, INF, PER/CENT/MIDLINE: HCPCS

## 2022-05-23 PROCEDURE — 99232 PR SUBSEQUENT HOSPITAL CARE,LEVL II: ICD-10-PCS | Mod: GC,,, | Performed by: INTERNAL MEDICINE

## 2022-05-23 PROCEDURE — 99232 SBSQ HOSP IP/OBS MODERATE 35: CPT | Mod: GC,,, | Performed by: INTERNAL MEDICINE

## 2022-05-23 PROCEDURE — 36415 COLL VENOUS BLD VENIPUNCTURE: CPT | Performed by: STUDENT IN AN ORGANIZED HEALTH CARE EDUCATION/TRAINING PROGRAM

## 2022-05-23 PROCEDURE — 85610 PROTHROMBIN TIME: CPT | Performed by: HOSPITALIST

## 2022-05-23 PROCEDURE — 1111F DSCHRG MED/CURRENT MED MERGE: CPT | Mod: CPTII,,, | Performed by: INTERNAL MEDICINE

## 2022-05-23 PROCEDURE — 99239 PR HOSPITAL DISCHARGE DAY,>30 MIN: ICD-10-PCS | Mod: ,,, | Performed by: INTERNAL MEDICINE

## 2022-05-23 PROCEDURE — U0002 COVID-19 LAB TEST NON-CDC: HCPCS | Performed by: INTERNAL MEDICINE

## 2022-05-23 PROCEDURE — 63600175 PHARM REV CODE 636 W HCPCS: Performed by: INTERNAL MEDICINE

## 2022-05-23 PROCEDURE — 25000003 PHARM REV CODE 250: Performed by: HOSPITALIST

## 2022-05-23 RX ORDER — WARFARIN SODIUM 5 MG/1
5 TABLET ORAL DAILY
Status: DISCONTINUED | OUTPATIENT
Start: 2022-05-23 | End: 2022-05-23

## 2022-05-23 RX ORDER — SODIUM CHLORIDE 0.9 % (FLUSH) 0.9 %
10 SYRINGE (ML) INJECTION EVERY 6 HOURS
Status: DISCONTINUED | OUTPATIENT
Start: 2022-05-23 | End: 2022-05-23 | Stop reason: HOSPADM

## 2022-05-23 RX ORDER — SODIUM CHLORIDE 0.9 % (FLUSH) 0.9 %
10 SYRINGE (ML) INJECTION
Status: DISCONTINUED | OUTPATIENT
Start: 2022-05-23 | End: 2022-05-23 | Stop reason: HOSPADM

## 2022-05-23 RX ORDER — WARFARIN SODIUM 5 MG/1
TABLET ORAL
Status: ON HOLD
Start: 2022-05-23 | End: 2023-09-14 | Stop reason: HOSPADM

## 2022-05-23 RX ORDER — VALACYCLOVIR HYDROCHLORIDE 1 G/1
1000 TABLET, FILM COATED ORAL 2 TIMES DAILY
Qty: 10 TABLET | Refills: 0 | Status: ON HOLD | OUTPATIENT
Start: 2022-05-23 | End: 2023-09-14 | Stop reason: HOSPADM

## 2022-05-23 RX ORDER — SOTALOL HYDROCHLORIDE 80 MG/1
80 TABLET ORAL 2 TIMES DAILY
Qty: 30 TABLET | Refills: 0 | Status: ON HOLD | OUTPATIENT
Start: 2022-05-23 | End: 2023-09-14 | Stop reason: HOSPADM

## 2022-05-23 RX ADMIN — VALACYCLOVIR HYDROCHLORIDE 1000 MG: 500 TABLET, FILM COATED ORAL at 08:05

## 2022-05-23 RX ADMIN — METOPROLOL SUCCINATE 50 MG: 50 TABLET, EXTENDED RELEASE ORAL at 08:05

## 2022-05-23 RX ADMIN — PREDNISOLONE ACETATE 1 DROP: 10 SUSPENSION/ DROPS OPHTHALMIC at 08:05

## 2022-05-23 RX ADMIN — PREDNISOLONE ACETATE 1 DROP: 10 SUSPENSION/ DROPS OPHTHALMIC at 03:05

## 2022-05-23 RX ADMIN — SOTALOL HYDROCHLORIDE 80 MG: 80 TABLET ORAL at 08:05

## 2022-05-23 RX ADMIN — CEFTRIAXONE SODIUM 2 G: 2 INJECTION, SOLUTION INTRAVENOUS at 03:05

## 2022-05-23 NOTE — TELEPHONE ENCOUNTER
----- Message from Aric Cuba sent at 5/23/2022 11:56 AM CDT -----  Contact: [phone] [facility]  Pharmacy is calling to clarify an RX.  RX name:  cefTRIAXone (ROCEPHIN) 2 g/50 mL D5W IVPB  What do they need to clarify:  Pharmacy do not carry or do injections   Comments:

## 2022-05-23 NOTE — NURSING
Pt was stable and calm. Pt received education on picc from infusion nurse. I went through discharge education with her. She had no questions at this time. Pt walked out with belongings. Medication being delivered tomorrow and she will  other medication from her pharmacy.

## 2022-05-23 NOTE — DISCHARGE SUMMARY
[facility] - Telemetry [facility] Medicine  Discharge Summary      Patient Name: Naina Keyes  MRN: [id number]  Patient Class: IP- Inpatient  Admission Date: 5/18/2022  Hospital Length of Stay: 5 days  Discharge Date and Time:  05/23/2022 3:45 PM  Attending Physician: Janina Olivier MD   Discharging Provider: Marshall Grady MD  Primary Care Provider: Anjana Torres NP  Hospital Medicine Team: [facility] [place] [facility] HOSP MED 4 Marshall Grady MD    HPI:   TRANSFER CENTER PHYSICIAN SUMMARY  Patient is a 61 y.o. female who has a past medical history of Hypertension, s/p Pacemaker, S/P Mechanical AoVR, S/P thoracic aortic aneurysm repair with Dacron graft (2007), and S/P VSD surgical patch repair 1997 presented with to [facility] ED for abnormal lab results. Patient had one year follow up with interventional cardiology yesterday where she complained of shortness of breath and fevers. She had labs drawn including blood cultures. 5/17 blood cultures returned positive for GPC in chains. Patient advised to go to local ED for further management. On arrival to ED work up unrevealing. Blood cultures have been repeated. She was started on IV Vancomycin. Vitals stable. Facility seeking transfer for ID and cardiology evaluation. Stable for transfer.      BEDSIDE EVAL  Seen in cardiology clinic on 5/16 - had c/o of fevers, fatigue, malaise and blood cultures were sent, ECHO completed and patient notified on 5/17 of positive blood culture results, she was advised to seek care,  ID staff Dr. Frieda Gutierrez requested patient transfer to [facility] from [facility] ED.     Patient reports that 6-8 weeks ago she had a bad sinus infection that transitioned to itchy/watery eyes and had a persistent non-productive cough, overall hasn't felt well since this time, her fevers have been sporadic with progressively worsening fatigue prior to her clinic appointment 5/16.  She has been adherent to home medications including her  anti-hypertensives.      She notes gingival bleeding when brushing her teeth, unsure of her last INR, chronically on Coumadin.  She had Right Eye Cataract surgery on 05/06/22, supposed to have Left Eye completed this week.  No dental procedures, denies any skin changes or rash.   She has hx of Mechanical aortic valve, Pacemaker placement (98% paced) and has Left knee arthroplasty       Procedure(s) (LRB):  ECHOCARDIOGRAM,TRANSESOPHAGEAL (N/A)      Hospital Course:   61F with extensive cardiac history, including pacemaker, s/p mechanical AoVR, s/p thoracic aortic aneurysm repair, and s/p VSD patch repair. She is here with streptococcal bacteremia (+cultures 5/16) after several weeks of fatigue and undulating fevers. EPS, ID, and CTS consulted - see notes; no surgery planned. She was started on vanc; culture resulted strep mitis pan-sensitive so de-escalated to ceftriaxone. Cultures cleared on 5/18. TTE negative for vegetation.  Discussed with cardiology and ID with plan for 6 weeks IV CTX outpatient with newly placed PICC.  CXR confirming position of line.  Discussed with EP and decision to hold home sotalol on discharge and close follow up outpatient with concern for QT prolongation.  Continuing home warfarin on discharge.  Close follow up with ID on discharge.       Goals of Care Treatment Preferences:  Code Status: Full Code      Physical Exam  Vitals and nursing note reviewed.   Constitutional:       General: She is not in acute distress.     Appearance: Normal appearance. She is not ill-appearing.   HENT:      Head: Normocephalic and atraumatic.      Nose: Nose normal.      Mouth/Throat:      Mouth: Mucous membranes are moist.      Pharynx: Oropharynx is clear. No oropharyngeal exudate.   Eyes:      General: No scleral icterus.     Extraocular Movements: Extraocular movements intact.      Conjunctiva/sclera: Conjunctivae normal.   Cardiovascular:      Rate and Rhythm: Normal rate and regular rhythm.      Pulses:  Normal pulses.      Heart sounds: Murmur (Mid-systolic click) heard.   Pulmonary:      Effort: Pulmonary effort is normal. No respiratory distress.      Breath sounds: Normal breath sounds. No wheezing or rales.   Chest:      Chest wall: No tenderness.   Abdominal:      General: Abdomen is flat. Bowel sounds are normal. There is no distension.      Palpations: Abdomen is soft.      Tenderness: There is no abdominal tenderness. There is no guarding or rebound.   Musculoskeletal:         General: No tenderness. Normal range of motion.      Cervical back: Normal range of motion and neck supple. No tenderness.      Right lower leg: No edema.      Left lower leg: No edema.   Lymphadenopathy:      Cervical: No cervical adenopathy.   Skin:     General: Skin is warm and dry.   Neurological:      General: No focal deficit present.      Mental Status: She is alert and oriented to person, place, and time.   Psychiatric:         Mood and Affect: Mood normal.         Behavior: Behavior normal.         Thought Content: Thought content normal.           Consults:   Consults (From admission, onward)        Status Ordering Provider     Inpatient consult to PICC team ([facility])  Once        Provider:  (Not yet assigned)    Completed VICTORIANO NORWOOD     Inpatient consult to Cardiothoracic Surgery  Once        Provider:  Jose D Delatorre MD    Completed MINNIE HINES     Inpatient consult to Interventional Cardiology  Once        Provider:  (Not yet assigned)    Completed ANN FITZPATRICK     Inpatient consult to Electrophysiology  Once        Provider:  (Not yet assigned)    Completed ANN FITZPATRICK     Inpatient consult to Infectious Diseases  Once        Provider:  (Not yet assigned)    Completed ANN FITZPATRICK          Cold sore  Patient with known cold sore around lip.  Started on valacyclovir for total course of 7 days.  Will be discharged on valacyclovir to complete course    Age-related cataract of both eyes  S/p right  cataract/lens surgery on 5/6 - receiving Steroid eye drops Pred-forte 4 times daily - continue for now  -left eye was to be this week, will be delayed for time being.   - plan to follow up with optho outpatient    Pt offered eye shield due to photophobia, declines at this time      Warfarin anticoagulation  On warfarin 5mg daily   - check daily INR  - given that patient may require diagnostic or other invasive procedures on admit, patient was switched to lovenox.  Transitioned back to warfarin on discharge.        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    Specialty: Family Medicine  Why: Established pt's hospital f/u visit on 6/1/22 @ 9:00am. Please bring discharge summary, ID, insurance card, and medication list.  Contact information:  [address]  [place] [address] [phone]                       Patient Instructions:      Ambulatory referral/consult to Infectious Disease   Standing Status: Future   Referral Priority: Routine Referral Type: Consultation   Referral Reason: Specialty Services Required   Requested Specialty: Infectious Diseases   Number of Visits Requested: 1       Significant Diagnostic Studies: Labs:   [facility]   Recent Labs   Lab  05/22/22  1133 05/23/22  0439    140   K 4.6 4.5    107   CO2 26 28   GLU 77 93   BUN 13 14   CREATININE 0.8 0.8   CALCIUM 9.2 9.1   ANIONGAP 9 5*   ESTGFRAFRICA >60.0 >60.0   EGFRNONAA >60.0 >60.0   , CBC   Recent Labs   Lab 05/22/22  1133 05/23/22  0439   WBC 5.63 5.77   HGB 11.5* 11.3*   HCT 36.4* 35.1*    189    and All labs within the past 24 hours have been reviewed  Radiology: X-Ray: CXR: Proper placement of newly placed PICC noted    Pending Diagnostic Studies:     Procedure Component Value Units Date/Time    COVID-19 Routine Screening [[id number]] Collected: 05/23/22 0908    Order Status: Sent Lab Status: In process Updated: 05/23/22 0908    Specimen: Nasopharyngeal     EKG 12-lead [[id number]]     Order Status: Sent Lab Status: No result     EKG 12-lead [[id number]]     Order Status: Sent Lab Status: No result     EKG 12-lead [[id number]]     Order Status: Sent Lab Status: No result          Medications:  Reconciled Home Medications:      Medication List      START taking these medications    cefTRIAXone 2 g/50 mL Pgbk IVPB  Commonly known as: ROCEPHIN  Inject 50 mLs (2 g total) into the vein once daily at 6am.     valACYclovir 1000 MG tablet  Commonly known as: VALTREX  Take 1 tablet (1,000 mg total) by mouth 2 (two) times daily. for 5 days        CHANGE how you take these medications    sotaloL 80 MG tablet  Commonly known as: BETAPACE  Take 1 tablet (80 mg total) by mouth 2 (two) times daily. Please HOLD taking Sotalol until follow up with cardiologist outpatient.  What changed: additional instructions        CONTINUE taking these medications    acetaminophen 500 MG tablet  Commonly known as: TYLENOL  Take 1,000 mg by mouth daily as needed (pain/fever).     ALPRAZolam 0.25 MG tablet  Commonly known as: XANAX  Take 0.25 mg by mouth 2 (two) times daily as needed for Anxiety.     COUMADIN ORAL  Take 5 mg by mouth once daily except on Thursday     loratadine 10 mg tablet  Commonly known as:  CLARITIN  TAKE ONE TABLET BY MOUTH DAILY AS NEEDED FOR ALLERGIES     metoprolol succinate 50 MG 24 hr tablet  Commonly known as: TOPROL-XL  Take 1 tablet every Morning and take 2 tablets in the evening     potassium chloride 10 MEQ Cpsr  Commonly known as: MICRO-K  Take 10 mEq by mouth 2 (two) times daily.     prednisoLONE acetate 1 % Drps  Commonly known as: PRED FORTE  Place 1 drop into the right eye 4 (four) times daily.     valsartan 320 MG tablet  Commonly known as: DIOVAN  Take 160 mg by mouth every 12 (twelve) hours. 1/2 in the morning 1/2 at night     VENTOLIN HFA INHL  Inhale 1 puff into the lungs as needed (wheezing/shortness of breath).     zolpidem 10 mg Tab  Commonly known as: AMBIEN  Take 10 mg by mouth every evening.            Indwelling Lines/Drains at time of discharge:   Lines/Drains/Airways     Peripherally Inserted Central Catheter Line  Duration           PICC Double Lumen 05/23/22 1440 right basilic <1 day                Time spent on the discharge of patient: 35 minutes         Marshall Grady MD  Department of Hospital Medicine  [facility] - Telemetry Stepdown
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
## 2022-05-23 NOTE — CONSULTS
[facility] - Telemetry Stepdown  Cardiology  Consult Note    Patient Name: Naina Keyes  MRN: [id number]  Admission Date: 5/18/2022  Hospital Length of Stay: 5 days  Code Status: Full Code   Attending Provider: Janina Olivier MD   Consulting Provider: Beatrice Segovia PA-C  Primary Care Physician: Anjana Torres NP  Principal Problem:Streptococcal bacteremia    Patient information was obtained from patient, past medical records and ER records.     Consults  Subjective:     Chief Complaint:  Fever    HPI:   Ms. Keyes is a 61 y.o. female who has a past medical history of Hypertension, mechanical AVR and VSD surgical patch repair in 1997, complicated by complete heart block, s/p implantation of a [employer] dual-chamber pacemaker on 10/31/1997. She subsequently underwent thoracic aortic aneurysm repair with a Dacron graft for endocarditis in 2007, with a postoperative proximal anastomosis leak treated with a 10mm Amplatzer septal occlusion device in 2009. She presented with to [facility] ED for abnormal lab results. Patient had routine follow up with interventional cardiology and complained of shortness of breath and fevers. She had labs drawn including blood cultures, which returned positive for Strep mitis on 5/17. Patient advised to go to local ED for further management and was transferred here for bacteremia. A CELSO has been ordered for assessment of her leads and her mechanical aortic valve.  Per EP, she is not a candidate for complex laser lead extraction, as her system is from 1997 with leads that are more than 20 years old. [facility] plans to re-evaluate her surgical candidacy following the CELSO.     TTE 5/16/22  · The left ventricle is normal in size with mild eccentric hypertrophy and normal systolic function.  · Normal left ventricular diastolic function.  · Normal right ventricular size with normal right ventricular systolic function.  · There is a mechanical aortic valve  present. There is trivial central aortic insufficiency present. Aortic valve area is 1.38 cm2; peak velocity is 3.38 m/s; mean gradient is 21 mmHg. The dimensionless index is 0.33.  · Aortic valve area is 1.38 cm2; peak velocity is 3.38 m/s; mean gradient is 21 mmHg. The dimensionless index is 0.33.  · A vascular plug is noted in the ascending aorta in a posterolateral location.  · The estimated ejection fraction is 60%.  · The ascending aorta is mildly dilated measuring 4.1 cm.  · The aortic root at the sinuses of Valsalva is mildly dilated measuring 3.7 cm  · Intermediate central venous pressure (8 mmHg).  · The estimated PA systolic pressure is 35 mmHg.    Anticoagulant/antiplatelets: Warfarin  ECG: Atrial paced rhythm    Dysphagia or odynophagia:  No  Liver Disease, esophageal disease, or known varices:  No  Upper GI Bleeding: No  Snoring:  No  Sleep Apnea:  No  Prior neck surgery or radiation:  No  History of anesthetic difficulties:   Anaphylaxis with Lidocaine spray  Family history of anesthetic difficulties:  No  Last oral intake: yesterday before midnight  Able to move neck in all directions:  No  Platelet count: 174k  INR: 1.7        Past Medical History:   Diagnosis Date    Heart disease     Hypertension     Pacemaker 11/25/2013    S/P Mechanical AVR 11/25/2013    S/P thoracic aortic aneurysm repair with Dacron graft (2007) 11/25/2013    S/P VSD surgical patch repair -1997 11/25/2013       Past Surgical History:   Procedure Laterality Date    ASCENDING AORTIC ANEURYSM REPAIR      BREAST SURGERY      CARDIAC CATHETERIZATION      CARDIAC PACEMAKER PLACEMENT      HYSTERECTOMY      MECHANICAL AORTIC VALVE REPLACEMENT         Review of patient's allergies indicates:   Allergen Reactions    Iodine Anaphylaxis     contrast    Lidocaine Anaphylaxis     cetacaine spray       No current facility-administered medications on file prior to encounter.     Current Outpatient Medications on File Prior to  Encounter   Medication Sig    acetaminophen (TYLENOL) 500 MG tablet Take 1,000 mg by mouth daily as needed (pain/fever).    ALBUTEROL SULFATE (VENTOLIN HFA INHL) Inhale 1 puff into the lungs as needed (wheezing/shortness of breath).    alprazolam (XANAX) 0.25 MG tablet Take 0.25 mg by mouth 2 (two) times daily as needed for Anxiety.    loratadine (CLARITIN) 10 mg tablet TAKE ONE TABLET BY MOUTH DAILY AS NEEDED FOR ALLERGIES    metoprolol succinate (TOPROL-XL) 50 MG 24 hr tablet Take 1 tablet every Morning and take 2 tablets in the evening    potassium chloride (MICRO-K) 10 MEQ CpSR Take 10 mEq by mouth 2 (two) times daily.     prednisoLONE acetate (PRED FORTE) 1 % DrpS Place 1 drop into the right eye 4 (four) times daily.    sotalol (BETAPACE) 80 MG tablet Take 80 mg by mouth 2 (two) times daily.    valsartan (DIOVAN) 320 MG tablet Take 160 mg by mouth every 12 (twelve) hours. 1/2 in the morning 1/2 at night    WARFARIN SODIUM (COUMADIN ORAL) Take 5 mg by mouth once daily except on Thursday    zolpidem (AMBIEN) 10 mg Tab Take 10 mg by mouth every evening.     Family History       Problem Relation (Age of Onset)    Arrhythmia Mother    Heart disease Mother, Father    Heart failure Mother    Hypertension Mother, Father          Tobacco Use    Smoking status: Former Smoker     Quit date: 2009     Years since quittin.5    Smokeless tobacco: Never Used   Substance and Sexual Activity    Alcohol use: No    Drug use: No    Sexual activity: Not on file     Review of Systems   Constitutional: Negative for diaphoresis, malaise/fatigue, weight gain and weight loss.   HENT:  Negative for nosebleeds.    Eyes:  Negative for vision loss in left eye, vision loss in right eye and visual disturbance.   Cardiovascular:  Negative for chest pain, claudication, dyspnea on exertion, irregular heartbeat, leg swelling, near-syncope, orthopnea, palpitations, paroxysmal nocturnal dyspnea and syncope.   Respiratory:   Negative for cough, shortness of breath, sleep disturbances due to breathing, snoring and wheezing.    Hematologic/Lymphatic: Negative for bleeding problem. Does not bruise/bleed easily.   Skin:  Negative for poor wound healing and rash.   Musculoskeletal:  Negative for muscle cramps and myalgias.   Gastrointestinal:  Negative for bloating, abdominal pain, diarrhea, heartburn, melena, nausea and vomiting.   Genitourinary:  Negative for hematuria and nocturia.   Neurological:  Negative for brief paralysis, dizziness, headaches, light-headedness, numbness and weakness.   Psychiatric/Behavioral:  Negative for depression.    Allergic/Immunologic: Negative for hives.   Objective:     Vital Signs (Most Recent):  Temp: 97.5 °F (36.4 °C) (05/23/22 0739)  Pulse: 68 (05/23/22 0748)  Resp: 16 (05/23/22 0739)  BP: (!) 140/71 (05/23/22 0739)  SpO2: 98 % (05/23/22 0739)   Vital Signs (24h Range):  Temp:  [97.5 °F (36.4 °C)-97.8 °F (36.6 °C)] 97.5 °F (36.4 °C)  Pulse:  [65-71] 68  Resp:  [14-18] 16  SpO2:  [95 %-100 %] 98 %  BP: (102-140)/(56-71) 140/71     Weight: 59.1 kg (130 lb 4.7 oz)  Body mass index is 23.08 kg/m².    SpO2: 98 %  O2 Device (Oxygen Therapy): room air      Intake/Output Summary (Last 24 hours) at 5/23/2022 0913  Last data filed at 5/22/2022 2000  Gross per 24 hour   Intake 300 ml   Output --   Net 300 ml       Lines/Drains/Airways       Peripheral Intravenous Line  Duration                  Peripheral IV - Single Lumen 05/18/22 1000 20 G Anterior;Right Forearm 4 days                    Physical Exam  Vitals and nursing note reviewed.   Constitutional:       Appearance: She is well-developed. She is not diaphoretic.   HENT:      Head: Normocephalic and atraumatic.   Eyes:      Conjunctiva/sclera: Conjunctivae normal.   Neck:      Vascular: No carotid bruit or JVD.   Cardiovascular:      Rate and Rhythm: Normal rate and regular rhythm.      Pulses: Normal pulses and intact distal pulses.      Heart sounds:  Murmur heard.     No friction rub. No gallop.   Pulmonary:      Effort: Pulmonary effort is normal.      Breath sounds: Normal breath sounds. No rales.   Chest:      Chest wall: No tenderness.   Abdominal:      General: There is no distension.      Palpations: Abdomen is soft. There is no mass.      Tenderness: There is no abdominal tenderness.   Skin:     General: Skin is warm and dry.      Coloration: Skin is not pale.   Neurological:      Mental Status: She is alert and oriented to person, place, and time.       Significant Labs:    CMP   Recent Labs   Lab 05/22/22  1133 05/23/22  0439    140   K 4.6 4.5    107   CO2 26 28   GLU 77 93   BUN 13 14   CREATININE 0.8 0.8   CALCIUM 9.2 9.1   ANIONGAP 9 5*   ESTGFRAFRICA >60.0 >60.0   EGFRNONAA >60.0 >60.0   , CBC   Recent Labs   Lab 05/22/22  1133 05/23/22  0439   WBC 5.63 5.77   HGB 11.5* 11.3*   HCT 36.4* 35.1*    189   , INR   Recent Labs   Lab 05/22/22  1133 05/23/22  0439   INR 1.4* 1.2   All pertinent lab results from the last 24 hours have been reviewed.      Assessment and Plan:     S/P Mechanical AVR secondary to bacterial endocarditis -1997, [employer] valve  62 y/o patient with history of mechanical AVR and VSD surgical patch repair in 1997, complicated by complete heart block, s/p implantation of a [employer] dual-chamber pacemaker on 10/31/1997, and subsequent thoracic aortic aneurysm repair with a Dacron graft for endocarditis in 2007, with a postoperative proximal anastomosis leak treated with a 10mm Amplatzer septal occlusion device in 2009. She is admitted for strep mitis bacteremia.     CELSO cancelled as there is no indication to perform.     Case discussed with an attending in echocardiography lab.    Further recommendations per attending addendum          VTE Risk Mitigation (From admission, onward)         Ordered     IP VTE LOW RISK PATIENT  Once         05/18/22 2129     Place sequential compression device  Until  discontinued         05/18/22 2129                Thank you for your consult. I will sign off. Please contact us if you have any additional questions.    Beatrice Segovia PA-C  Cardiology   [facility] - Telemetry Stepdown

## 2022-05-23 NOTE — SUBJECTIVE & OBJECTIVE
Past Medical History:   Diagnosis Date    Heart disease     Hypertension     Pacemaker 11/25/2013    S/P Mechanical AVR 11/25/2013    S/P thoracic aortic aneurysm repair with Dacron graft (2007) 11/25/2013    S/P VSD surgical patch repair -1997 11/25/2013       Past Surgical History:   Procedure Laterality Date    ASCENDING AORTIC ANEURYSM REPAIR      BREAST SURGERY      CARDIAC CATHETERIZATION      CARDIAC PACEMAKER PLACEMENT      HYSTERECTOMY      MECHANICAL AORTIC VALVE REPLACEMENT         Review of patient's allergies indicates:   Allergen Reactions    Iodine Anaphylaxis     contrast    Lidocaine Anaphylaxis     cetacaine spray       No current facility-administered medications on file prior to encounter.     Current Outpatient Medications on File Prior to Encounter   Medication Sig    acetaminophen (TYLENOL) 500 MG tablet Take 1,000 mg by mouth daily as needed (pain/fever).    ALBUTEROL SULFATE (VENTOLIN HFA INHL) Inhale 1 puff into the lungs as needed (wheezing/shortness of breath).    alprazolam (XANAX) 0.25 MG tablet Take 0.25 mg by mouth 2 (two) times daily as needed for Anxiety.    loratadine (CLARITIN) 10 mg tablet TAKE ONE TABLET BY MOUTH DAILY AS NEEDED FOR ALLERGIES    metoprolol succinate (TOPROL-XL) 50 MG 24 hr tablet Take 1 tablet every Morning and take 2 tablets in the evening    potassium chloride (MICRO-K) 10 MEQ CpSR Take 10 mEq by mouth 2 (two) times daily.     prednisoLONE acetate (PRED FORTE) 1 % DrpS Place 1 drop into the right eye 4 (four) times daily.    sotalol (BETAPACE) 80 MG tablet Take 80 mg by mouth 2 (two) times daily.    valsartan (DIOVAN) 320 MG tablet Take 160 mg by mouth every 12 (twelve) hours. 1/2 in the morning 1/2 at night    WARFARIN SODIUM (COUMADIN ORAL) Take 5 mg by mouth once daily except on Thursday    zolpidem (AMBIEN) 10 mg Tab Take 10 mg by mouth every evening.     Family History       Problem Relation (Age of Onset)    Arrhythmia Mother    Heart disease Mother,  Father    Heart failure Mother    Hypertension Mother, Father          Tobacco Use    Smoking status: Former Smoker     Quit date: 2009     Years since quittin.5    Smokeless tobacco: Never Used   Substance and Sexual Activity    Alcohol use: No    Drug use: No    Sexual activity: Not on file     Review of Systems   Constitutional: Negative for diaphoresis, malaise/fatigue, weight gain and weight loss.   HENT:  Negative for nosebleeds.    Eyes:  Negative for vision loss in left eye, vision loss in right eye and visual disturbance.   Cardiovascular:  Negative for chest pain, claudication, dyspnea on exertion, irregular heartbeat, leg swelling, near-syncope, orthopnea, palpitations, paroxysmal nocturnal dyspnea and syncope.   Respiratory:  Negative for cough, shortness of breath, sleep disturbances due to breathing, snoring and wheezing.    Hematologic/Lymphatic: Negative for bleeding problem. Does not bruise/bleed easily.   Skin:  Negative for poor wound healing and rash.   Musculoskeletal:  Negative for muscle cramps and myalgias.   Gastrointestinal:  Negative for bloating, abdominal pain, diarrhea, heartburn, melena, nausea and vomiting.   Genitourinary:  Negative for hematuria and nocturia.   Neurological:  Negative for brief paralysis, dizziness, headaches, light-headedness, numbness and weakness.   Psychiatric/Behavioral:  Negative for depression.    Allergic/Immunologic: Negative for hives.   Objective:     Vital Signs (Most Recent):  Temp: 97.5 °F (36.4 °C) (22 0739)  Pulse: 68 (22 0748)  Resp: 16 (22 0739)  BP: (!) 140/71 (22 0739)  SpO2: 98 % (22 0739)   Vital Signs (24h Range):  Temp:  [97.5 °F (36.4 °C)-97.8 °F (36.6 °C)] 97.5 °F (36.4 °C)  Pulse:  [65-71] 68  Resp:  [14-18] 16  SpO2:  [95 %-100 %] 98 %  BP: (102-140)/(56-71) 140/71     Weight: 59.1 kg (130 lb 4.7 oz)  Body mass index is 23.08 kg/m².    SpO2: 98 %  O2 Device (Oxygen Therapy): room  air      Intake/Output Summary (Last 24 hours) at 5/23/2022 0913  Last data filed at 5/22/2022 2000  Gross per 24 hour   Intake 300 ml   Output --   Net 300 ml       Lines/Drains/Airways       Peripheral Intravenous Line  Duration                  Peripheral IV - Single Lumen 05/18/22 1000 20 G Anterior;Right Forearm 4 days                    Physical Exam  Vitals and nursing note reviewed.   Constitutional:       Appearance: She is well-developed. She is not diaphoretic.   HENT:      Head: Normocephalic and atraumatic.   Eyes:      Conjunctiva/sclera: Conjunctivae normal.   Neck:      Vascular: No carotid bruit or JVD.   Cardiovascular:      Rate and Rhythm: Normal rate and regular rhythm.      Pulses: Normal pulses and intact distal pulses.      Heart sounds: Murmur heard.     No friction rub. No gallop.   Pulmonary:      Effort: Pulmonary effort is normal.      Breath sounds: Normal breath sounds. No rales.   Chest:      Chest wall: No tenderness.   Abdominal:      General: There is no distension.      Palpations: Abdomen is soft. There is no mass.      Tenderness: There is no abdominal tenderness.   Skin:     General: Skin is warm and dry.      Coloration: Skin is not pale.   Neurological:      Mental Status: She is alert and oriented to person, place, and time.       Significant Labs:    CMP   Recent Labs   Lab 05/22/22  1133 05/23/22  0439    140   K 4.6 4.5    107   CO2 26 28   GLU 77 93   BUN 13 14   CREATININE 0.8 0.8   CALCIUM 9.2 9.1   ANIONGAP 9 5*   ESTGFRAFRICA >60.0 >60.0   EGFRNONAA >60.0 >60.0   , CBC   Recent Labs   Lab 05/22/22  1133 05/23/22  0439   WBC 5.63 5.77   HGB 11.5* 11.3*   HCT 36.4* 35.1*    189   , INR   Recent Labs   Lab 05/22/22  1133 05/23/22  0439   INR 1.4* 1.2   All pertinent lab results from the last 24 hours have been reviewed.

## 2022-05-23 NOTE — PLAN OF CARE
[facility] - Telemetry Stepdown  Discharge Final Note    Primary Care Provider: Anjana Torres NP    Expected Discharge Date: 5/23/2022    Final Discharge Note (most recent)     Final Note - 05/23/22 1604        Final Note    Assessment Type Final Discharge Note     Anticipated Discharge Disposition Home-Health Care [facility] Resources/Appts/Education Provided Appointments scheduled and added to AVS        Post-Acute Status    Post-Acute Authorization Home Health;IV Infusion     Home Health Status Set-up Complete/Auth obtained   [facility]     IV Infusion Status Set-up Complete/Auth obtained   [facility]    Discharge Delays None known at this time               Contact Info     Anjana Torres NP   Specialty: Family Medicine   Relationship: PCP - General    [address]  [place] [address]   Phone: [phone]       Next Steps: Follow up on 6/1/2022    Instructions: Established pt's hospital f/u visit on 6/1/22 @ 9:00am. Please bring discharge summary, ID, insurance card, and medication list.        Anai Auguste LMSW  [facility]  Ext. [phone]

## 2022-05-23 NOTE — MEDICAL/APP STUDENT
Hospital Medicine Student   Progress Note  [facility] HOSP MED 4    Admit Date: 5/18/2022  Hospital Day: 5  05/23/2022  8:20 AM    SUBJECTIVE:   Principal Problem: Streptococcal bacteremia.    HPI: TRANSFER CENTER PHYSICIAN SUMMARY  Patient is a 61 y.o. female who has a past medical history of Hypertension, s/p Pacemaker, S/P Mechanical AoVR, S/P thoracic aortic aneurysm repair with Dacron graft (2007), and S/P VSD surgical patch repair 1997 presented with to [facility] ED for abnormal lab results. Patient had one year follow up with interventional cardiology yesterday where she complained of shortness of breath and fevers. She had labs drawn including blood cultures. 5/17 blood cultures returned positive for GPC in chains. Patient advised to go to local ED for further management. On arrival to ED work up unrevealing. Blood cultures have been repeated. She was started on IV Vancomycin. Vitals stable. Facility seeking transfer for ID and cardiology evaluation. Stable for transfer.     BEDSIDE EVAL  Seen in cardiology clinic on 5/16 - had c/o of fevers, fatigue, malaise and blood cultures were sent, ECHO completed and patient notified on 5/17 of positive blood culture results, she was advised to seek care,  ID staff Dr. Frieda Gutierrez requested patient transfer to [facility] from [facility] ED.   Patient reports that 6-8 weeks ago she had a bad sinus infection that transitioned to itchy/watery eyes and had a persistent non-productive cough, overall hasn't felt well since this time, her fevers have been sporadic with progressively worsening fatigue prior to her clinic appointment 5/16.  She has been adherent to home medications including her anti-hypertensives.    She notes gingival bleeding when brushing her teeth, unsure of her last INR, chronically on Coumadin.  She had Right Eye Cataract surgery on 05/06/22, supposed to have Left Eye completed this week.  No dental procedures, denies any skin changes or  rash.   She has hx of Mechanical aortic valve, Pacemaker placement (98% paced) and has Left knee arthroplasty     Hospital Course: Pt started on vancomycin on 5/18 blood cultures from 5/16 pending sensitivity. She had a TTE on 5/16 that was negative for vegetations. CELSO pending. Cardiology, ID, and CTS consulted. (5/19) Pt says she feels like her usual self. No complaints at the moment. ABx switched to ceftriaxone after sensitivity results. Positive for stress and anxiety. Negative for recent fever, chills, nausia, vomiting, fategue, recent night sweats, LOC, HA, slurring speech, SOB, chest pain, palpitations, constipation, or diarrhea. (5/20) Pt continues to feel well with no complaints. She slept well last night. No new fevers, chills, night sweats, nausia, vomiting, fategue, LOC, HA, slurring speech, SOB, chest pain, palpitations, constipation, or diarrhea. She is still anxious about the possibility of valve surgery. (5/22) Restarted therapeutic lovenox given subtherapeutic INR.      Interval history: NAEON. Pt NPO at midnight for CELSO today. Pt continues to feel well and she has no new complaints. No new fevers, chills, night sweats, nausia, vomiting, fategue, LOC, HA, slurring speech, SOB, chest pain, palpitations, constipation, or diarrhea.    Review of Systems   Constitutional: Negative for chills, fever and malaise/fatigue.   HENT: Negative.    Eyes: Positive for blurred vision and photophobia. Negative for double vision.   Respiratory: Negative for cough and shortness of breath.    Cardiovascular: Negative for chest pain, palpitations, orthopnea, claudication, leg swelling and PND.   Gastrointestinal: Negative for abdominal pain, blood in stool, constipation, diarrhea, heartburn, melena, nausea and vomiting.   Genitourinary: Negative.    Musculoskeletal: Negative for myalgias.   Skin: Negative.    Neurological: Negative for dizziness, sensory change, speech change, focal weakness, seizures, loss of  consciousness, weakness and headaches.   Psychiatric/Behavioral: Negative for depression. The patient is nervous/anxious.        Please refer to the H&P for past medical, family, and social history.    OBJECTIVE:     Vital Signs Recent:  Temp: 97.5 °F (36.4 °C) (05/23/22 0739)  Pulse: 68 (05/23/22 0748)  Resp: 16 (05/23/22 0739)  BP: (!) 140/71 (05/23/22 0739)  SpO2: 98 % (05/23/22 0739)  Oxygen Documentation:                O2 Device (Oxygen Therapy): room air         Vital Signs Range (Last 24H):  Temp:  [97.5 °F (36.4 °C)-97.8 °F (36.6 °C)]   Pulse:  [65-71]   Resp:  [14-18]   BP: (102-140)/(56-71)   SpO2:  [95 %-100 %]        I & O (Last 24H):No intake or output data in the 24 hours ending 05/23/22 0820     Physical Exam:  Physical Exam  Constitutional:       General: She is not in acute distress.     Appearance: Normal appearance. She is not ill-appearing.   HENT:      Head: Atraumatic.      Mouth/Throat:      Mouth: Mucous membranes are moist.      Pharynx: Oropharynx is clear.   Eyes:      Extraocular Movements: Extraocular movements intact.      Conjunctiva/sclera: Conjunctivae normal.   Cardiovascular:      Rate and Rhythm: Normal rate and regular rhythm.      Pulses: Normal pulses.      Heart sounds: Murmur heard.    Systolic murmur is present with a grade of 5/6.  Pulmonary:      Effort: Pulmonary effort is normal.      Breath sounds: Normal breath sounds.   Abdominal:      General: Abdomen is flat. Bowel sounds are normal.      Palpations: Abdomen is soft.      Tenderness: There is no abdominal tenderness.   Musculoskeletal:         General: Normal range of motion.      Cervical back: Normal range of motion.   Skin:     General: Skin is warm and dry.   Neurological:      Mental Status: She is alert and oriented to person, place, and time.      Sensory: No sensory deficit.      Motor: No weakness.   Psychiatric:         Mood and Affect: Mood normal.         Behavior: Behavior normal.         Thought  Content: Thought content normal.         Judgment: Judgment normal.       Labs:   Recent Labs   Lab 05/21/22  0406 05/22/22  1133 05/23/22  0439    142 140   K 4.1 4.6 4.5    107 107   CO2 24 26 28   BUN 11 13 14   CREATININE 0.8 0.8 0.8   GLU 79 77 93   CALCIUM 8.8 9.2 9.1     Recent Labs   Lab 05/16/22  1302 05/19/22  0538 05/21/22  0406 05/22/22  1133 05/23/22  0439   ALKPHOS 98  --   --   --   --    ALT 13  --   --   --   --    AST 21  --   --   --   --    ALBUMIN 3.8  --   --   --   --    PROT 7.9  --   --   --   --    BILITOT 0.7  --   --   --   --    INR  --    < > 1.7* 1.4* 1.2    < > = values in this interval not displayed.     Recent Labs   Lab 05/21/22  0406 05/22/22  1133 05/23/22  0439   WBC 4.84 5.63 5.77   HGB 10.9* 11.5* 11.3*   HCT 34.3* 36.4* 35.1*    187 189       Diagnostic Results:  TTE (5/16):   · The left ventricle is normal in size with mild eccentric hypertrophy and normal systolic function.  · Normal left ventricular diastolic function.  · Normal right ventricular size with normal right ventricular systolic function.  · There is a mechanical aortic valve present. There is trivial central aortic insufficiency present. Aortic valve area is 1.38 cm2; peak velocity is 3.38 m/s; mean gradient is 21 mmHg. The dimensionless index is 0.33.  · Aortic valve area is 1.38 cm2; peak velocity is 3.38 m/s; mean gradient is 21 mmHg. The dimensionless index is 0.33.  · A vascular plug is noted in the ascending aorta in a posterolateral location.  · The estimated ejection fraction is 60%.  · The ascending aorta is mildly dilated measuring 4.1 cm.  · The aortic root at the sinuses of Valsalva is mildly dilated measuring 3.7 cm  · Intermediate central venous pressure (8 mmHg).  · The estimated PA systolic pressure is 35 mmHg.     EKG (5/23):  Atrial-paced rhythm with prolonged AV conduction   Left axis deviation   LVH with QRS widening and repolarization abnormality ( R in aVL , Pedro    product , Romhilt-Ferguson )   Abnormal ECG   When compared with ECG of 22-MAY-2022 11:56,   Nonspecific T wave abnormality no longer evident in Inferior leads   T wave inversion no longer evident in Anterior leads     Scheduled Meds:   cefTRIAXone (ROCEPHIN) IVPB  2 g Intravenous Q24H    metoprolol succinate  50 mg Oral Daily    And    metoprolol succinate  100 mg Oral QHS    polyethylene glycol  17 g Oral Daily    prednisoLONE acetate  1 drop Right Eye QID    sotaloL  80 mg Oral Daily    valACYclovir  1,000 mg Oral BID     Continuous Infusions:  PRN Meds:acetaminophen, ALPRAZolam, cetirizine, melatonin, naloxone, senna-docusate 8.6-50 mg, sodium chloride 0.9%, zolpidem    ASSESSMENT/PLAN:   Ms. Naina Keyes is a 61 y.o. female with a relevant medical history of Hypertension, s/p Pacemaker, S/P Mechanical AoVR, S/P thoracic aortic aneurysm repair with Dacron graft (2007), and S/P VSD surgical patch repair 1997 who is being followed up for Streptococcal bacteremia.    1.Streptococcal bacteremia  ASSESSMENT:  Pt has extensive cardiac history with Mechanical AoVR and implanted pacemaker. Had blood cultures positive for Strep. Mitis. Concerning for potential infective endocarditis or infected pacemaker shivani. Originally started on vancomycin and switched to ceftriaxone after sensitivity studies.   PLAN:  -Continuing ceftriaxone 2g IV q12 hrs. Consulted cardiology - F/U with TTE scheduled for Monday and evaluating PM extraction. Consulted CTS - Continue to manage with antibiotics, redo valve operation would be risky - Will F/U after CELSO. Consulted ID and they agree with the above    2. Age-related cataracts  ASSESSMENT: Had R cataract surgery on 5/6 - receiving prednisolone eye drops. Had L eye surgery scheduled for this week and it will be delayed.   PLAN: Pt offered eye shield due to photophobia and she declined, will continue to monitor.     3. Anticoagulation  ASSESSMENT: Pt on warfarin 5mg daily.  Plan:  daily INR, switched to Lovenox 1mg/kg q12hrs in case she needs an invasive procedure. Will switch back to warfarin after CELSO because no interventions are scheduled.       Lainey Lane, MS3

## 2022-05-23 NOTE — CONSULTS
Double lumen PICC placed in right basilic vein for 6 weeks of IV ceftriaxone, 34 cm in length and 0 cm exposed.  Arm circumference 30 cm.  Lot #[id number].

## 2022-05-23 NOTE — ASSESSMENT & PLAN NOTE
60 y/o patient with history of mechanical AVR and VSD surgical patch repair in 1997, complicated by complete heart block, s/p implantation of a [employer] dual-chamber pacemaker on 10/31/1997, and subsequent thoracic aortic aneurysm repair with a Dacron graft for endocarditis in 2007, with a postoperative proximal anastomosis leak treated with a 10mm Amplatzer septal occlusion device in 2009. She is admitted for strep mitis bacteremia. CELSO ordered to assess leads and mechanical aortic valve.    -No absolute contraindications of esophageal stricture, tumor, perforation, laceration,or diverticulum and/or active GI bleed  -The risks, benefits & alternatives of the procedure were explained to the patient.   -The risks of transesophageal echo include but are not limited to:  Dental trauma, esophageal trauma/perforation, bleeding, laryngospasm/brochospasm, aspiration, sore throat/hoarseness, & dislodgement of the endotracheal tube/nasogastric tube (where applicable).    -The risks of ANES monitored sedation include hypotension, respiratory depression, arrhythmias, bronchospasm, & death.    -Informed consent was obtained. The patient is agreeable to proceed with the procedure and all questions and concerns addressed.    Case discussed with an attending in echocardiography lab.    Further recommendations per attending addendum

## 2022-05-23 NOTE — ASSESSMENT & PLAN NOTE
Patient with known cold sore around lip.  Started on valacyclovir for total course of 7 days.  Will be discharged on valacyclovir to complete course

## 2022-05-23 NOTE — ASSESSMENT & PLAN NOTE
On warfarin 5mg daily   - check daily INR  - given that patient may require diagnostic or other invasive procedures on admit, patient was switched to lovenox.  Transitioned back to warfarin on discharge.

## 2022-05-23 NOTE — CARE UPDATE
[facility] scheduled pt's hospital f/u visit on 6/1/22 @ 9:00am and Infectious Disease 6/21/22 @ 10:30am.

## 2022-05-23 NOTE — SUBJECTIVE & OBJECTIVE
Interval History: No acute events overnight.  Patient awaiting CELSO this am.    Review of Systems   Constitutional:  Negative for chills, fatigue and fever.   HENT:  Negative for sore throat and trouble swallowing.    Respiratory:  Negative for cough and shortness of breath.    Cardiovascular:  Negative for chest pain, palpitations and leg swelling.   Gastrointestinal:  Negative for abdominal pain, constipation, diarrhea, nausea and vomiting.   Genitourinary:  Negative for dysuria and hematuria.   Musculoskeletal:  Negative for myalgias, neck pain and neck stiffness.   Skin:  Negative for rash.   Neurological:  Negative for dizziness and headaches.   Objective:     Vital Signs (Most Recent):  Temp: 97.5 °F (36.4 °C) (05/23/22 0739)  Pulse: 68 (05/23/22 0748)  Resp: 16 (05/23/22 0739)  BP: (!) 140/71 (05/23/22 0739)  SpO2: 98 % (05/23/22 0739)   Vital Signs (24h Range):  Temp:  [97.5 °F (36.4 °C)-97.8 °F (36.6 °C)] 97.5 °F (36.4 °C)  Pulse:  [65-71] 68  Resp:  [14-18] 16  SpO2:  [95 %-100 %] 98 %  BP: (102-140)/(56-71) 140/71     Weight: 59.1 kg (130 lb 4.7 oz)  Body mass index is 23.08 kg/m².  No intake or output data in the 24 hours ending 05/23/22 0823   Physical Exam  Vitals and nursing note reviewed.   Constitutional:       General: She is not in acute distress.     Appearance: Normal appearance. She is not ill-appearing or toxic-appearing.   HENT:      Head: Normocephalic and atraumatic.      Right Ear: External ear normal.      Left Ear: External ear normal.      Nose: Nose normal.      Mouth/Throat:      Mouth: Mucous membranes are moist.      Pharynx: Oropharynx is clear.   Eyes:      Extraocular Movements: Extraocular movements intact.   Cardiovascular:      Rate and Rhythm: Normal rate.      Pulses: Normal pulses.      Heart sounds: Murmur heard.      Comments: Mid-systolic click  Systolic ejection murmur  Pulmonary:      Effort: Pulmonary effort is normal. No respiratory distress.      Breath sounds:  Normal breath sounds. No wheezing.   Abdominal:      General: Abdomen is flat. Bowel sounds are normal. There is no distension.      Palpations: Abdomen is soft.      Tenderness: There is no abdominal tenderness.   Musculoskeletal:         General: No swelling.      Cervical back: Neck supple. No tenderness.      Right lower leg: No edema.      Left lower leg: No edema.   Lymphadenopathy:      Cervical: No cervical adenopathy.   Skin:     Findings: No bruising or rash.      Comments: No osler nodes, janeway lesions, or splinter hemorrhages   Neurological:      General: No focal deficit present.      Mental Status: She is alert and oriented to person, place, and time.      Motor: No weakness.   Psychiatric:         Mood and Affect: Mood normal.         Thought Content: Thought content normal.       Significant Labs: All pertinent labs within the past 24 hours have been reviewed.  CBC:   Recent Labs   Lab 05/22/22  1133 05/23/22  0439   WBC 5.63 5.77   HGB 11.5* 11.3*   HCT 36.4* 35.1*    189     CMP:   Recent Labs   Lab 05/22/22  1133 05/23/22  0439    140   K 4.6 4.5    107   CO2 26 28   GLU 77 93   BUN 13 14   CREATININE 0.8 0.8   CALCIUM 9.2 9.1   ANIONGAP 9 5*   EGFRNONAA >60.0 >60.0       Significant Imaging: I have reviewed all pertinent imaging results/findings within the past 24 hours.

## 2022-05-23 NOTE — PLAN OF CARE
[facility] - Telemetry Stepdown      HOME HEALTH ORDERS  FACE TO FACE ENCOUNTER    Patient Name: Naina Keyes  YOB: 1960    PCP: Anjana Torres NP   PCP Address: [address] [place] [address]  PCP Phone Number: [phone]  PCP Fax: [phone]    Encounter Date: 5/17/22    Admit to Home Health    Diagnoses:  Active Hospital Problems    Diagnosis  POA    *Streptococcal bacteremia [R78.81, B95.5]  Yes    Cold sore [B00.1]  No    Warfarin anticoagulation [Z79.01]  Not Applicable     Chronic    Age-related cataract of both eyes [H25.9]  Yes    S/P Mechanical AVR secondary to bacterial endocarditis -1997, [facility] valve [Z95.2]  Not Applicable     Chronic    Pacemaker [Z95.0]  Yes     Chronic    S/P thoracic aortic aneurysm repair with Dacron graft (2007) [Z98.890, Z86.79]  Not Applicable     Chronic      Resolved Hospital Problems   No resolved problems to display.       Follow Up Appointments:  No future appointments.    Allergies:  Review of patient's allergies indicates:   Allergen Reactions    Iodine Anaphylaxis     contrast    Lidocaine Anaphylaxis     cetacaine spray       Medications: Review discharge medications with patient and family and provide education.    Current Facility-Administered Medications   Medication Dose Route Frequency Provider Last Rate Last Admin    acetaminophen tablet 650 mg  650 mg Oral Q4H PRN Mesfin Warren MD        ALPRAZolam tablet 0.25 mg  0.25 mg Oral BID PRN Mesfin Warren MD   0.25 mg at 05/19/22 1810    cefTRIAXone (ROCEPHIN) 2 g/50 mL D5W IVPB  2 g Intravenous Q24H Candy Dickson MD   Stopped at 05/22/22 1545    cetirizine tablet 10 mg  10 mg Oral Daily PRN Mesfin Warren MD        melatonin tablet 6 mg  6 mg Oral Nightly PRN Mesfin Warren MD   6 mg at 05/22/22 2128    metoprolol succinate (TOPROL-XL) 24 hr tablet 50 mg  50 mg Oral Daily Mesfin Warren MD   50 mg at 05/23/22 0858    And    metoprolol  succinate (TOPROL-XL) 24 hr tablet 100 mg  100 mg Oral QHS Mesfin Warren MD   100 mg at 05/22/22 2127    naloxone 0.4 mg/mL injection 0.02 mg  0.02 mg Intravenous PRN Mesfin Warren MD        polyethylene glycol packet 17 g  17 g Oral Daily Mesfin Warren MD        prednisoLONE acetate 1 % ophthalmic suspension 1 drop  1 drop Right Eye QID Mesfin Warren MD   1 drop at 05/23/22 0858    senna-docusate 8.6-50 mg per tablet 1 tablet  1 tablet Oral BID PRN Mesfin Warren MD        sodium chloride 0.9% flush 10 mL  10 mL Intravenous Q6H PRN Mesfin Warren MD        sotaloL tablet 80 mg  80 mg Oral Daily Jaz Vicente MD   80 mg at 05/23/22 0858    valACYclovir tablet 1,000 mg  1,000 mg Oral BID Jaz Vicente MD   1,000 mg at 05/23/22 0858    warfarin (COUMADIN) tablet 5 mg  5 mg Oral Daily Marshall Grady MD        zolpidem tablet 10 mg  10 mg Oral Nightly PRN Sammy Jeff MD   10 mg at 05/22/22 2126     Current Discharge Medication List      START taking these medications    Details   cefTRIAXone (ROCEPHIN) 2 g/50 mL PgBk IVPB Inject 50 mLs (2 g total) into the vein once daily at 6am.  Qty: 2100 mL, Refills: 0      valACYclovir (VALTREX) 1000 MG tablet Take 1 tablet (1,000 mg total) by mouth 2 (two) times daily. for 5 days  Qty: 10 tablet, Refills: 0         CONTINUE these medications which have CHANGED    Details   sotaloL (BETAPACE) 80 MG tablet Take 1 tablet (80 mg total) by mouth 2 (two) times daily. Please HOLD taking Sotalol until follow up with cardiologist outpatient.  Qty: 30 tablet, Refills: 0    Comments: Please hold taking Sotalol until follow up with cardiologist outpatient.         CONTINUE these medications which have NOT CHANGED    Details   acetaminophen (TYLENOL) 500 MG tablet Take 1,000 mg by mouth daily as needed (pain/fever).      ALBUTEROL SULFATE (VENTOLIN HFA INHL) Inhale 1 puff into the lungs as needed (wheezing/shortness of breath).      alprazolam (XANAX) 0.25 MG  tablet Take 0.25 mg by mouth 2 (two) times daily as needed for Anxiety.      loratadine (CLARITIN) 10 mg tablet TAKE ONE TABLET BY MOUTH DAILY AS NEEDED FOR ALLERGIES      metoprolol succinate (TOPROL-XL) 50 MG 24 hr tablet Take 1 tablet every Morning and take 2 tablets in the evening      potassium chloride (MICRO-K) 10 MEQ CpSR Take 10 mEq by mouth 2 (two) times daily.       prednisoLONE acetate (PRED FORTE) 1 % DrpS Place 1 drop into the right eye 4 (four) times daily.      valsartan (DIOVAN) 320 MG tablet Take 160 mg by mouth every 12 (twelve) hours. 1/2 in the morning 1/2 at night      WARFARIN SODIUM (COUMADIN ORAL) Take 5 mg by mouth once daily except on Thursday      zolpidem (AMBIEN) 10 mg Tab Take 10 mg by mouth every evening.               I have seen and examined this patient within the last 30 days. My clinical findings that support the need for the home health skilled services and home bound status are the following:no   Patient with medication mismanagement issues requiring home bound status as evidenced by  Needing IV medications.     Diet:   cardiac diet    Labs:  Report Lab results to PCP.    Referrals/ Consults  Aide to provide assistance with personal care, ADLs, and vital signs.    Activities:   activity as tolerated    Nursing:   Agency to admit patient within 24 hours of hospital discharge unless specified on physician order or at patient request    SN to complete comprehensive assessment including routine vital signs. Instruct on disease process and s/s of complications to report to MD. Review/verify medication list sent home with the patient at time of discharge  and instruct patient/caregiver as needed. Frequency may be adjusted depending on start of care date.     Skilled nurse to perform up to 3 visits PRN for symptoms related to diagnosis    Notify MD if SBP > 160 or < 90; DBP > 90 or < 50; HR > 120 or < 50; Temp > 101; O2 < 88%    Ok to schedule additional visits based on staff  availability and patient request on consecutive days within the home health episode.    When multiple disciplines ordered:    Start of Care occurs on Sunday - Wednesday schedule remaining discipline evaluations as ordered on separate consecutive days following the start of care.    Thursday SOC -schedule subsequent evaluations Friday and Monday the following week.     Friday - Saturday SOC - schedule subsequent discipline evaluations on consecutive days starting Monday of the following week.    For all post-discharge communication and subsequent orders please contact patient's primary care physician. If unable to reach primary care physician or do not receive response within 30 minutes, please contact PCP for clinical staff order clarification    Miscellaneous   Home Infusion Therapy:   SN to perform Infusion Therapy/Central Line Care.  Review Central Line Care & Central Line Flush with patient.    Administer (drug and dose): Ceftriaxone 2g once daily for 6 weeks    Last dose given: 5/23/2022                        Home dose due: 5/24/2022    Scrub the Hub: Prior to accessing the line, always perform a 30 second alcohol scrub  Each lumen of the central line is to be flushed at least daily with 10 mL Normal Saline and 3 mL Heparin flush (10 units/mL)  Skilled Nurse (SN) may draw blood from IV access  Blood Draw Procedure:   - Aspirate at least 5 mL of blood   - Discard   - Obtain specimen   - Change injection cap   - Flush with 20 mL Normal Saline followed by a                 3-5 mL Heparin flush (10 units/mL)  Central :   - Sterile dressing changes are done weekly and as needed.   - Use chlor-hexadine scrub to cleanse site, apply Biopatch to insertion site,       apply securement device dressing   - Injection caps are changed weekly and after EVERY lab draw.   - If sterile gauze is under dressing to control oozing,                 dressing change must be performed every 24 hours until gauze is not  needed.    Home Health Aide:  Nursing Daily and Home Health Aide Daily    Wound Care Orders  no    I certify that this patient is confined to her home and needs intermittent skilled nursing care.

## 2022-05-23 NOTE — HPI
Ms. Keyes is a 61 y.o. female who has a past medical history of Hypertension, mechanical AVR and VSD surgical patch repair in 1997, complicated by complete heart block, s/p implantation of a [employer] dual-chamber pacemaker on 10/31/1997. She subsequently underwent thoracic aortic aneurysm repair with a Dacron graft for endocarditis in 2007, with a postoperative proximal anastomosis leak treated with a 10mm Amplatzer septal occlusion device in 2009. She presented with to [facility] ED for abnormal lab results. Patient had routine follow up with interventional cardiology and complained of shortness of breath and fevers. She had labs drawn including blood cultures, which returned positive for Strep mitis on 5/17. Patient advised to go to local ED for further management and was transferred here for bacteremia. A CELSO has been ordered for assessment of her leads and her mechanical aortic valve.  Per EP, she is not a candidate for complex laser lead extraction, as her system is from 1997 with leads that are more than 20 years old. [facility] plans to re-evaluate her surgical candidacy following the CELSO.     TTE 5/16/22  The left ventricle is normal in size with mild eccentric hypertrophy and normal systolic function.  Normal left ventricular diastolic function.  Normal right ventricular size with normal right ventricular systolic function.  There is a mechanical aortic valve present. There is trivial central aortic insufficiency present. Aortic valve area is 1.38 cm2; peak velocity is 3.38 m/s; mean gradient is 21 mmHg. The dimensionless index is 0.33.  Aortic valve area is 1.38 cm2; peak velocity is 3.38 m/s; mean gradient is 21 mmHg. The dimensionless index is 0.33.  A vascular plug is noted in the ascending aorta in a posterolateral location.  The estimated ejection fraction is 60%.  The ascending aorta is mildly dilated measuring 4.1 cm.  The aortic root at the sinuses of Valsalva is mildly  dilated measuring 3.7 cm  Intermediate central venous pressure (8 mmHg).  The estimated PA systolic pressure is 35 mmHg.    Anticoagulant/antiplatelets: Warfarin  ECG: Atrial paced rhythm    Dysphagia or odynophagia:  No  Liver Disease, esophageal disease, or known varices:  No  Upper GI Bleeding: No  Snoring:  No  Sleep Apnea:  No  Prior neck surgery or radiation:  No  History of anesthetic difficulties:   Anaphylaxis with Lidocaine spray  Family history of anesthetic difficulties:  No  Last oral intake: yesterday before midnight  Able to move neck in all directions:  No  Platelet count: 174k  INR: 1.7

## 2022-05-23 NOTE — PLAN OF CARE
05/23/22 1157   Post-Acute Status   Post-Acute Authorization Home Health;IV Infusion   Home Health Status Referrals Sent   IV Infusion Status Referral(s) sent   Patient lives in [place]; referrals sent to [facility] and [facility].    1:30 PM  [facility]- spoke with Desirae, reviewing referral  [facility]- spoke with Radha, informed that PICC is expected to be placed within the next hour or two. Radha is on her way to [facility] [place] and will provide education once the PICC line is placed.    1:47 PM  Patient prefers [facility]. Spoke with [facility] and confirmed they are in network with insurance; referral sent in [facility].  Notified [facility] to disregard initial referral.    Anai Auguste LMSW  [facility]  Ext. [phone]

## 2022-05-23 NOTE — PROCEDURES
"Naina Keyes is a 61 y.o. female patient.    Temp: 98 °F (36.7 °C) (05/23/22 1136)  Pulse: 70 (05/23/22 1142)  Resp: 18 (05/23/22 1136)  BP: (!) 149/71 (05/23/22 1136)  SpO2: 98 % (05/23/22 1136)  Weight: 59.1 kg (130 lb 4.7 oz) (05/22/22 0600)  Height: 5' 3" (160 cm) (05/18/22 2200)    PICC  Date/Time: 5/23/2022 2:38 PM  Performed by: Cliff Stack RN  Assisting provider: Nidia Wilson RN  Post-operative diagnosis: picc line placement  Consent Done: Yes  Time out: Immediately prior to procedure a time out was called to verify the correct patient, procedure, equipment, support staff and site/side marked as required  Indications: med administration and vascular access  Anesthesia: local infiltration  Local anesthetic: lidocaine 1% without epinephrine  Anesthetic Total (mL): 2  Description of findings: picc line placement  Preparation: skin prepped with ChloraPrep  Skin prep agent dried: skin prep agent completely dried prior to procedure  Sterile barriers: all five maximum sterile barriers used - cap, mask, sterile gown, sterile gloves, and large sterile sheet  Hand hygiene: hand hygiene performed prior to central venous catheter insertion  Location details: right basilic  Catheter type: double lumen  Catheter size: 5 Fr  Catheter Length: 34cm    Ultrasound guidance: yes  Vessel Caliber: medium and patent, compressibility normal  Vascular Doppler: not done  Needle advanced into vessel with real time Ultrasound guidance.  Guidewire confirmed in vessel.  Image recorded and saved.  Sterile sheath used.  no esophageal manometryNumber of attempts: 1  Post-procedure: blood return through all ports, chlorhexidine patch and sterile dressing applied  Technical procedures used: 3CG  Specimens: No  Implants: No  Assessment: placement verified by x-ray  Complications: none          Name DRAGAN Wilson RN  5/23/2022  "

## 2022-05-23 NOTE — PROGRESS NOTES
CELSO canceled by Cardiology staff Dr. Hakeem Lindsay. Patient can resume prior diet. Forward any questions regarding cancellation to Dr. Denise Larsen via secure chat.

## 2022-05-23 NOTE — ASSESSMENT & PLAN NOTE
61 year old female PMH hypertension, pacemaker, mechanical AoVR, thoracic aortic aneurysm repair - dacron graft- 2007, with 2-3 mth hx of intermittent fevers presented as a transfer for streptococcal mitis/ oralis bacteremia and concern for PVE IE. BCx negative since 5/18. CELSO cancelled per cards as no plan for surgical intervention. Will plan to treat for presumed PVE. Pt understands she will need lifelong suppressive antibiotics and is a risk for treatment failure.    Plan:  --continue ceftriaxone 2g IV q 24 x 6 wks from date of neg BCx. (last day 6/29/22).  --will need long-term po abx suppression after completion of IV abx as well as surveillance cultures  --will need weekly CBC, CMP, ESR, CRP faxed to ID clinic  --will arrange f/u in ID clinic

## 2022-05-23 NOTE — ASSESSMENT & PLAN NOTE
S/p right cataract/lens surgery on 5/6 - receiving Steroid eye drops Pred-forte 4 times daily - continue for now  -left eye was to be this week, will be delayed for time being.   - plan to follow up with optho outpatient    Pt offered eye shield due to photophobia, declines at this time

## 2022-05-23 NOTE — SUBJECTIVE & OBJECTIVE
Interval History: No acute events overnight. Pt reports feeling well. She is currently opting for medical management given high risk for surgical complications. CELSO cancelled per cards.    Review of Systems   Constitutional:  Negative for chills, fatigue and fever.   Eyes:  Negative for visual disturbance.   Respiratory:  Negative for cough and shortness of breath.    Cardiovascular:  Negative for chest pain and leg swelling.   Gastrointestinal:  Negative for abdominal pain and diarrhea.   All other systems reviewed and are negative.  Objective:     Vital Signs (Most Recent):  Temp: 98 °F (36.7 °C) (05/23/22 1136)  Pulse: 70 (05/23/22 1142)  Resp: 18 (05/23/22 1136)  BP: (!) 149/71 (05/23/22 1136)  SpO2: 98 % (05/23/22 1136)   Vital Signs (24h Range):  Temp:  [97.5 °F (36.4 °C)-98 °F (36.7 °C)] 98 °F (36.7 °C)  Pulse:  [66-71] 70  Resp:  [14-18] 18  SpO2:  [95 %-100 %] 98 %  BP: (126-149)/(59-71) 149/71     Weight: 59.1 kg (130 lb 4.7 oz)  Body mass index is 23.08 kg/m².    Estimated Creatinine Clearance: 59.5 mL/min (based on SCr of 0.8 mg/dL).    Physical Exam  Constitutional:       Appearance: Normal appearance.   HENT:      Head: Normocephalic and atraumatic.   Eyes:      Conjunctiva/sclera: Conjunctivae normal.      Pupils: Pupils are equal, round, and reactive to light.   Cardiovascular:      Rate and Rhythm: Normal rate and regular rhythm.      Heart sounds: Murmur heard.   Pulmonary:      Effort: Pulmonary effort is normal. No respiratory distress.      Breath sounds: Normal breath sounds.   Abdominal:      General: Abdomen is flat.      Palpations: Abdomen is soft.   Musculoskeletal:         General: Normal range of motion.      Cervical back: Normal range of motion.      Right lower leg: No edema.      Left lower leg: No edema.   Skin:     General: Skin is warm and dry.   Neurological:      General: No focal deficit present.      Mental Status: She is alert and oriented to person, place, and time.    Psychiatric:         Mood and Affect: Mood normal.         Behavior: Behavior normal.       Significant Labs: Blood Culture:   Recent Labs   Lab 05/16/22  1302 05/18/22  2310 05/18/22  2320   LABBLOO Gram stain aer bottle: Gram positive cocci in chains resembling Strep   Results called to and read back by: Beth Miguel RN 05/17/2022  09:35  Gram stain cally bottle: Gram positive cocci in chains resembling Strep   Positive results previously called 05/17/2022  12:18  STREPTOCOCCUS MITIS/ORALIS   For susceptibility see order #[id number]  *  Gram stain aer bottle: Gram positive cocci in chains resembling Strep   Results called to and read back by: Beth Miguel RN 05/17/2022  09:35  Gram stain cally bottle: Gram positive cocci in chains resembling Strep   Positive results previously called 05/17/2022  11:26  STREPTOCOCCUS MITIS/ORALIS * No Growth to date  No Growth to date  No Growth to date  No Growth to date  No Growth to date No Growth to date  No Growth to date  No Growth to date  No Growth to date  No Growth to date     All pertinent labs within the past 24 hours have been reviewed.    Significant Imaging: I have reviewed all pertinent imaging results/findings within the past 24 hours.

## 2022-05-24 LAB
BACTERIA BLD CULT: NORMAL
BACTERIA BLD CULT: NORMAL

## 2022-06-15 ENCOUNTER — OFFICE VISIT (OUTPATIENT)
Dept: INFECTIOUS DISEASES | Facility: CLINIC | Age: 62
End: 2022-06-15
Payer: MEDICARE

## 2022-06-15 VITALS
HEART RATE: 70 BPM | DIASTOLIC BLOOD PRESSURE: 71 MMHG | HEIGHT: 63 IN | BODY MASS INDEX: 24.06 KG/M2 | WEIGHT: 135.81 LBS | TEMPERATURE: 98 F | SYSTOLIC BLOOD PRESSURE: 137 MMHG

## 2022-06-15 DIAGNOSIS — I38 PROSTHETIC VALVE ENDOCARDITIS, SUBSEQUENT ENCOUNTER: Primary | ICD-10-CM

## 2022-06-15 DIAGNOSIS — T82.6XXD PROSTHETIC VALVE ENDOCARDITIS, SUBSEQUENT ENCOUNTER: Primary | ICD-10-CM

## 2022-06-15 DIAGNOSIS — A49.1 STREPTOCOCCAL INFECTION: ICD-10-CM

## 2022-06-15 DIAGNOSIS — Z79.2 RECEIVING INTRAVENOUS ANTIBIOTIC TREATMENT AS OUTPATIENT: ICD-10-CM

## 2022-06-15 PROCEDURE — 1111F DSCHRG MED/CURRENT MED MERGE: CPT | Mod: CPTII,S$GLB,, | Performed by: INTERNAL MEDICINE

## 2022-06-15 PROCEDURE — 99214 OFFICE O/P EST MOD 30 MIN: CPT | Mod: S$GLB,,, | Performed by: INTERNAL MEDICINE

## 2022-06-15 PROCEDURE — 3075F SYST BP GE 130 - 139MM HG: CPT | Mod: CPTII,S$GLB,, | Performed by: INTERNAL MEDICINE

## 2022-06-15 PROCEDURE — 3008F PR BODY MASS INDEX (BMI) DOCUMENTED: ICD-10-PCS | Mod: CPTII,S$GLB,, | Performed by: INTERNAL MEDICINE

## 2022-06-15 PROCEDURE — 1159F PR MEDICATION LIST DOCUMENTED IN MEDICAL RECORD: ICD-10-PCS | Mod: CPTII,S$GLB,, | Performed by: INTERNAL MEDICINE

## 2022-06-15 PROCEDURE — 3078F PR MOST RECENT DIASTOLIC BLOOD PRESSURE < 80 MM HG: ICD-10-PCS | Mod: CPTII,S$GLB,, | Performed by: INTERNAL MEDICINE

## 2022-06-15 PROCEDURE — 1111F PR DISCHARGE MEDS RECONCILED W/ CURRENT OUTPATIENT MED LIST: ICD-10-PCS | Mod: CPTII,S$GLB,, | Performed by: INTERNAL MEDICINE

## 2022-06-15 PROCEDURE — 99999 PR PBB SHADOW E&M-EST. PATIENT-LVL III: CPT | Mod: PBBFAC,,, | Performed by: INTERNAL MEDICINE

## 2022-06-15 PROCEDURE — 3008F BODY MASS INDEX DOCD: CPT | Mod: CPTII,S$GLB,, | Performed by: INTERNAL MEDICINE

## 2022-06-15 PROCEDURE — 1160F RVW MEDS BY RX/DR IN RCRD: CPT | Mod: CPTII,S$GLB,, | Performed by: INTERNAL MEDICINE

## 2022-06-15 PROCEDURE — 1160F PR REVIEW ALL MEDS BY PRESCRIBER/CLIN PHARMACIST DOCUMENTED: ICD-10-PCS | Mod: CPTII,S$GLB,, | Performed by: INTERNAL MEDICINE

## 2022-06-15 PROCEDURE — 3075F PR MOST RECENT SYSTOLIC BLOOD PRESS GE 130-139MM HG: ICD-10-PCS | Mod: CPTII,S$GLB,, | Performed by: INTERNAL MEDICINE

## 2022-06-15 PROCEDURE — 99999 PR PBB SHADOW E&M-EST. PATIENT-LVL III: ICD-10-PCS | Mod: PBBFAC,,, | Performed by: INTERNAL MEDICINE

## 2022-06-15 PROCEDURE — 1159F MED LIST DOCD IN RCRD: CPT | Mod: CPTII,S$GLB,, | Performed by: INTERNAL MEDICINE

## 2022-06-15 PROCEDURE — 3078F DIAST BP <80 MM HG: CPT | Mod: CPTII,S$GLB,, | Performed by: INTERNAL MEDICINE

## 2022-06-15 PROCEDURE — 99214 PR OFFICE/OUTPT VISIT, EST, LEVL IV, 30-39 MIN: ICD-10-PCS | Mod: S$GLB,,, | Performed by: INTERNAL MEDICINE

## 2022-06-15 RX ORDER — OFLOXACIN 3 MG/ML
5 SOLUTION AURICULAR (OTIC) DAILY
COMMUNITY

## 2022-06-15 NOTE — PROGRESS NOTES
"Subjective:      Patient ID: Naina Keyes is a 61 y.o. female.    Chief Complaint:Follow-up      History of Present Illness    A 61-year-old woman with mechanical AV replacement, aortic graft due to endocarditis (2007?), treated with antibiotics however patient unclear of infecting organism or therapy, post op leak repair, ascending aortic aneurysm, HTN,  s/p pacemaker, and Streptococcus mitis/oralis bacteremia in May 2022 who is seen as a hospital follow up. After her initial visit, her blood cultures were reported positive for the aforementioned organism. She wad admitted and started on ceftriaxone for 6 weeks with a scheduled end date of 6/29/22. She was evaluated for surgical options however was deemed high risk and a non surgical candidate. She has been tolerating antibiotics without adverse effect and her fever has not recurred.       Prior Visit HPI: was referred for evaluation due to fever for 2-3 months. Mrs. Keyes reports fever from 101-103 F occurring at least 4 times per weeks with associated chills and night sweats. The fever occurs mostly at night and is associated with anorexia, and weight loss of 6 lbs in 2 months. She denies rashes, and other symptoms.     She does report a history of increased sputum production in March after mowing her grass however those symptoms resolved.     Mrs. Keyes does not have antibiotic allergies. She has a pet dog. She denies toxic habits. 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 [place]."      Review of Systems   Constitutional: Negative for chills, decreased appetite, fever, malaise/fatigue, night sweats, weight gain and weight loss.   HENT: Negative for congestion, ear pain, hearing loss, hoarse voice, sore throat and tinnitus.    Eyes: Negative for blurred vision, redness and visual disturbance.   Cardiovascular: Negative for chest pain, leg swelling and palpitations.   Respiratory: Negative for " cough, hemoptysis, shortness of breath, sputum production and wheezing.    Hematologic/Lymphatic: Negative for adenopathy. Does not bruise/bleed easily.   Skin: Negative for dry skin, itching, rash and suspicious lesions.   Musculoskeletal: Negative for back pain, joint pain, myalgias and neck pain.   Gastrointestinal: Negative for abdominal pain, constipation, diarrhea, heartburn, nausea and vomiting.   Genitourinary: Negative for dysuria, flank pain, frequency, hematuria, hesitancy and urgency.   Neurological: Negative for dizziness, headaches, numbness, paresthesias and weakness.   Psychiatric/Behavioral: Negative for depression and memory loss. The patient does not have insomnia and is not nervous/anxious.      Objective:   Physical Exam  Vitals and nursing note reviewed.   Constitutional:       Appearance: She is well-developed.   HENT:      Head: Normocephalic and atraumatic.      Right Ear: External ear normal.      Left Ear: External ear normal.   Eyes:      General: No scleral icterus.        Right eye: No discharge.         Left eye: No discharge.      Conjunctiva/sclera: Conjunctivae normal.   Cardiovascular:      Rate and Rhythm: Normal rate and regular rhythm.      Heart sounds: Murmur heard.     No friction rub. No gallop.   Pulmonary:      Effort: Pulmonary effort is normal. No respiratory distress.      Breath sounds: Normal breath sounds. No stridor. No wheezing or rales.   Abdominal:      General: Bowel sounds are normal.   Musculoskeletal:         General: No tenderness. Normal range of motion.      Comments: RUE PICC without erythema or tenderness to palpation.   Skin:     General: Skin is warm and dry.   Neurological:      Mental Status: She is alert and oriented to person, place, and time.       Assessment:       1. Prosthetic valve endocarditis, subsequent encounter    2. Streptococcal infection    3. 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 Fever episode have resolved. The patient also reports that chronic left knee pain and swelling have resolved which is concerning for an occult infection of her knee.   · Continue ceftriaxone.  · Continue OPAT labs and PICC care.   · Will transition to oral suppression on completion of antibiotics in an attempt to prevent a future recurrence.   · RTC at [facility] date: 6/29/22.

## 2022-06-29 ENCOUNTER — PATIENT MESSAGE (OUTPATIENT)
Dept: INFECTIOUS DISEASES | Facility: CLINIC | Age: 62
End: 2022-06-29
Payer: MEDICARE

## 2022-06-29 DIAGNOSIS — T82.6XXD PROSTHETIC VALVE ENDOCARDITIS, SUBSEQUENT ENCOUNTER: Primary | ICD-10-CM

## 2022-06-29 DIAGNOSIS — I38 PROSTHETIC VALVE ENDOCARDITIS, SUBSEQUENT ENCOUNTER: Primary | ICD-10-CM

## 2022-06-29 DIAGNOSIS — A49.1 STREPTOCOCCAL INFECTION: ICD-10-CM

## 2022-06-29 DIAGNOSIS — Z79.2 CHRONIC ANTIBIOTIC SUPPRESSION: ICD-10-CM

## 2022-06-29 RX ORDER — CEFADROXIL 500 MG/1
1 CAPSULE ORAL EVERY 12 HOURS
Qty: 120 CAPSULE | Refills: 11 | Status: SHIPPED | OUTPATIENT
Start: 2022-06-29 | End: 2023-05-08 | Stop reason: SDUPTHER

## 2022-06-30 ENCOUNTER — TELEPHONE (OUTPATIENT)
Dept: INFECTIOUS DISEASES | Facility: CLINIC | Age: 62
End: 2022-06-30
Payer: MEDICARE

## 2022-06-30 NOTE — TELEPHONE ENCOUNTER
----- Message from Misti Godinez sent at 6/30/2022  8:57 AM CDT -----  Contact: @[phone]  Pt is calling in to see if Dr. Gutierrez was going to call in a prescription for her anabiotic. Following pt appt yesterday. Please call to discuss further.              [facility] [place], [place] - [address] [place]  [address] [facility] [place] [address]  Phone: [phone] Fax: [phone]

## 2022-10-25 ENCOUNTER — EXTERNAL HOME HEALTH (OUTPATIENT)
Dept: HOME HEALTH SERVICES | Facility: HOSPITAL | Age: 62
End: 2022-10-25
Payer: MEDICARE

## 2023-02-16 ENCOUNTER — OFFICE VISIT (OUTPATIENT)
Dept: INFECTIOUS DISEASES | Facility: CLINIC | Age: 63
End: 2023-02-16
Payer: MEDICARE

## 2023-02-16 VITALS
HEART RATE: 78 BPM | HEIGHT: 63 IN | BODY MASS INDEX: 26.09 KG/M2 | TEMPERATURE: 98 F | SYSTOLIC BLOOD PRESSURE: 115 MMHG | WEIGHT: 147.25 LBS | DIASTOLIC BLOOD PRESSURE: 66 MMHG

## 2023-02-16 DIAGNOSIS — I38 PROSTHETIC VALVE ENDOCARDITIS, SUBSEQUENT ENCOUNTER: Primary | ICD-10-CM

## 2023-02-16 DIAGNOSIS — T82.6XXD PROSTHETIC VALVE ENDOCARDITIS, SUBSEQUENT ENCOUNTER: Primary | ICD-10-CM

## 2023-02-16 DIAGNOSIS — Z79.2 CHRONIC ANTIBIOTIC SUPPRESSION: ICD-10-CM

## 2023-02-16 DIAGNOSIS — A49.1 STREPTOCOCCAL INFECTION: ICD-10-CM

## 2023-02-16 PROCEDURE — 1159F PR MEDICATION LIST DOCUMENTED IN MEDICAL RECORD: ICD-10-PCS | Mod: CPTII,S$GLB,, | Performed by: INTERNAL MEDICINE

## 2023-02-16 PROCEDURE — 1159F MED LIST DOCD IN RCRD: CPT | Mod: CPTII,S$GLB,, | Performed by: INTERNAL MEDICINE

## 2023-02-16 PROCEDURE — 3008F PR BODY MASS INDEX (BMI) DOCUMENTED: ICD-10-PCS | Mod: CPTII,S$GLB,, | Performed by: INTERNAL MEDICINE

## 2023-02-16 PROCEDURE — 3074F SYST BP LT 130 MM HG: CPT | Mod: CPTII,S$GLB,, | Performed by: INTERNAL MEDICINE

## 2023-02-16 PROCEDURE — 99214 OFFICE O/P EST MOD 30 MIN: CPT | Mod: S$GLB,,, | Performed by: INTERNAL MEDICINE

## 2023-02-16 PROCEDURE — 3078F DIAST BP <80 MM HG: CPT | Mod: CPTII,S$GLB,, | Performed by: INTERNAL MEDICINE

## 2023-02-16 PROCEDURE — 1160F RVW MEDS BY RX/DR IN RCRD: CPT | Mod: CPTII,S$GLB,, | Performed by: INTERNAL MEDICINE

## 2023-02-16 PROCEDURE — 3078F PR MOST RECENT DIASTOLIC BLOOD PRESSURE < 80 MM HG: ICD-10-PCS | Mod: CPTII,S$GLB,, | Performed by: INTERNAL MEDICINE

## 2023-02-16 PROCEDURE — 1160F PR REVIEW ALL MEDS BY PRESCRIBER/CLIN PHARMACIST DOCUMENTED: ICD-10-PCS | Mod: CPTII,S$GLB,, | Performed by: INTERNAL MEDICINE

## 2023-02-16 PROCEDURE — 99999 PR PBB SHADOW E&M-EST. PATIENT-LVL III: ICD-10-PCS | Mod: PBBFAC,,, | Performed by: INTERNAL MEDICINE

## 2023-02-16 PROCEDURE — 99214 PR OFFICE/OUTPT VISIT, EST, LEVL IV, 30-39 MIN: ICD-10-PCS | Mod: S$GLB,,, | Performed by: INTERNAL MEDICINE

## 2023-02-16 PROCEDURE — 99999 PR PBB SHADOW E&M-EST. PATIENT-LVL III: CPT | Mod: PBBFAC,,, | Performed by: INTERNAL MEDICINE

## 2023-02-16 PROCEDURE — 3074F PR MOST RECENT SYSTOLIC BLOOD PRESSURE < 130 MM HG: ICD-10-PCS | Mod: CPTII,S$GLB,, | Performed by: INTERNAL MEDICINE

## 2023-02-16 PROCEDURE — 3008F BODY MASS INDEX DOCD: CPT | Mod: CPTII,S$GLB,, | Performed by: INTERNAL MEDICINE

## 2023-02-16 NOTE — LETTER
February 23, 2023        Kenny Newsome MD  [address] [facility] [place] [address]             [facility] - Infectious Disease 1st Fl  [address]  [place] [address]  Phone: [phone]  Fax: [phone]   Patient: Naina Keyes   MR Number: [id number]   YOB: 1960   Date of Visit: 2/16/2023     Dear Dr. Newsome,     Mrs. Keyes was evaluated for follow up in my clinic. She will continue her lifelong antibiotic suppression with cefadroxil. If you have any questions or concerns you may request records via our Medical Records office.     Sincerely,    Frieda Gutierrez MD            CC    No Recipients    Enclosure

## 2023-02-23 NOTE — PROGRESS NOTES
"Subjective:      Patient ID: Naina Keyes is a 62 y.o. female.    Chief Complaint:Follow-up      History of Present Illness    A 62-year-old woman with mechanical AV replacement, aortic graft due to endocarditis (2007?), treated with antibiotics however patient unclear of infecting organism or therapy, post op leak repair, ascending aortic aneurysm, HTN,  s/p pacemaker, and Streptococcus mitis/oralis bacteremia in May 2022 who is seen as a follow up. Mrs. Keyes has been on chronic antibiotic suppression with cefadroxil 1 gm BID without adverse effects. None of her presenting symptoms have recurred at this time. She was advised by her Cardiologist to follow up in my clinic due to concerns about her antibiotic suppression.     Past Visit History: "After her initial visit, her blood cultures were reported positive for the aforementioned organism. She wad admitted and started on ceftriaxone for 6 weeks with a scheduled end date of 6/29/22. She was evaluated for surgical options however was deemed high risk and a non surgical candidate. She has been tolerating antibiotics without adverse effect and her fever has not recurred."       Initial Visit HPI: "was referred for evaluation due to fever for 2-3 months. Mrs. Keyes reports fever from 101-103 F occurring at least 4 times per weeks with associated chills and night sweats. The fever occurs mostly at night and is associated with anorexia, and weight loss of 6 lbs in 2 months. She denies rashes, and other symptoms.    Mrs. Keyes does not have antibiotic allergies. She has a pet dog. She denies toxic habits. 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 [place]."      Review of Systems   Constitutional: Negative for chills, decreased appetite, fever, malaise/fatigue, night sweats, weight gain and weight loss.   HENT:  Negative for congestion, ear pain, hearing loss, hoarse voice, sore throat and tinnitus.  "   Eyes:  Negative for blurred vision, redness and visual disturbance.   Cardiovascular:  Negative for chest pain, leg swelling and palpitations.   Respiratory:  Negative for cough, hemoptysis, shortness of breath, sputum production and wheezing.    Hematologic/Lymphatic: Negative for adenopathy. Does not bruise/bleed easily.   Skin:  Negative for dry skin, itching, rash and suspicious lesions.   Musculoskeletal:  Negative for back pain, joint pain, myalgias and neck pain.   Gastrointestinal:  Negative for abdominal pain, constipation, diarrhea, heartburn, nausea and vomiting.   Genitourinary:  Negative for dysuria, flank pain, frequency, hematuria, hesitancy and urgency.   Neurological:  Negative for dizziness, headaches, numbness, paresthesias and weakness.   Psychiatric/Behavioral:  Negative for depression and memory loss. The patient does not have insomnia and is not nervous/anxious.    Objective:   Physical Exam  Vitals and nursing note reviewed.   Constitutional:       Appearance: She is well-developed.   HENT:      Head: Normocephalic and atraumatic.      Right Ear: External ear normal.      Left Ear: External ear normal.   Eyes:      General: No scleral icterus.        Right eye: No discharge.         Left eye: No discharge.      Conjunctiva/sclera: Conjunctivae normal.   Cardiovascular:      Rate and Rhythm: Normal rate and regular rhythm.      Heart sounds: Murmur heard.     No friction rub. No gallop.   Pulmonary:      Effort: Pulmonary effort is normal. No respiratory distress.      Breath sounds: Normal breath sounds. No stridor. No wheezing or rales.   Abdominal:      General: Bowel sounds are normal.   Musculoskeletal:         General: No tenderness. Normal range of motion.   Skin:     General: Skin is warm and dry.   Neurological:      Mental Status: She is alert and oriented to person, place, and time.     Assessment:       1. Prosthetic valve endocarditis, subsequent encounter    2. Streptococcal  infection    3. Chronic antibiotic suppression        Plan:   Patient with known prosthetic valve endocarditis due to S mitis/oralis s/p IV antibiotic therapy and now on chronic antibiotic suppression. She is not a surgical candidate and discontinuing her lifelong antibiotic suppression increases her risk further valvular degeneration and subsequently increases her risk for heart failure from valvular disease.    Continue cefadroxil lifelong suppression. This will not be discontinued without discussion with her Interventional Cardiologist Dr. Gallegos or myself.   Patient has been advised of antibiotic related adverse effects. She will call my office for further management should any develop.  All questions answered.   RTC as needed in 12 months.

## 2023-05-08 ENCOUNTER — HOSPITAL ENCOUNTER (OUTPATIENT)
Dept: RADIOLOGY | Facility: HOSPITAL | Age: 63
Discharge: HOME | End: 2023-05-08
Attending: INTERNAL MEDICINE
Payer: MEDICARE

## 2023-05-08 ENCOUNTER — OFFICE VISIT (OUTPATIENT)
Dept: CARDIOLOGY | Facility: CLINIC | Age: 63
End: 2023-05-08
Payer: MEDICARE

## 2023-05-08 VITALS
BODY MASS INDEX: 26.68 KG/M2 | DIASTOLIC BLOOD PRESSURE: 58 MMHG | SYSTOLIC BLOOD PRESSURE: 118 MMHG | HEIGHT: 63 IN | HEART RATE: 68 BPM | OXYGEN SATURATION: 95 % | WEIGHT: 150.56 LBS

## 2023-05-08 DIAGNOSIS — I38 PROSTHETIC VALVE ENDOCARDITIS, SUBSEQUENT ENCOUNTER: ICD-10-CM

## 2023-05-08 DIAGNOSIS — Z86.79 S/P THORACIC AORTIC ANEURYSM REPAIR: Chronic | ICD-10-CM

## 2023-05-08 DIAGNOSIS — Z95.2 S/P AVR: ICD-10-CM

## 2023-05-08 DIAGNOSIS — Z95.0 PACEMAKER: Chronic | ICD-10-CM

## 2023-05-08 DIAGNOSIS — Z98.890 S/P THORACIC AORTIC ANEURYSM REPAIR: Chronic | ICD-10-CM

## 2023-05-08 DIAGNOSIS — Z95.2 S/P AVR: Chronic | ICD-10-CM

## 2023-05-08 DIAGNOSIS — I71.21 ANEURYSM OF ASCENDING AORTA WITHOUT RUPTURE: ICD-10-CM

## 2023-05-08 DIAGNOSIS — Z87.74 S/P VSD REPAIR: Chronic | ICD-10-CM

## 2023-05-08 DIAGNOSIS — T82.6XXD PROSTHETIC VALVE ENDOCARDITIS, SUBSEQUENT ENCOUNTER: ICD-10-CM

## 2023-05-08 DIAGNOSIS — Z79.01 WARFARIN ANTICOAGULATION: Primary | Chronic | ICD-10-CM

## 2023-05-08 DIAGNOSIS — Z79.2 CHRONIC ANTIBIOTIC SUPPRESSION: ICD-10-CM

## 2023-05-08 DIAGNOSIS — A49.1 STREPTOCOCCAL INFECTION: ICD-10-CM

## 2023-05-08 PROCEDURE — 3074F SYST BP LT 130 MM HG: CPT | Mod: CPTII,S$GLB,, | Performed by: INTERNAL MEDICINE

## 2023-05-08 PROCEDURE — 71250 CT THORAX DX C-: CPT | Mod: 26,,, | Performed by: RADIOLOGY

## 2023-05-08 PROCEDURE — 3078F PR MOST RECENT DIASTOLIC BLOOD PRESSURE < 80 MM HG: ICD-10-PCS | Mod: CPTII,S$GLB,, | Performed by: INTERNAL MEDICINE

## 2023-05-08 PROCEDURE — 1159F PR MEDICATION LIST DOCUMENTED IN MEDICAL RECORD: ICD-10-PCS | Mod: CPTII,S$GLB,, | Performed by: INTERNAL MEDICINE

## 2023-05-08 PROCEDURE — 71250 CT CHEST WITHOUT CONTRAST: ICD-10-PCS | Mod: 26,,, | Performed by: RADIOLOGY

## 2023-05-08 PROCEDURE — 3008F PR BODY MASS INDEX (BMI) DOCUMENTED: ICD-10-PCS | Mod: CPTII,S$GLB,, | Performed by: INTERNAL MEDICINE

## 2023-05-08 PROCEDURE — 71250 CT THORAX DX C-: CPT | Mod: TC

## 2023-05-08 PROCEDURE — 99999 PR PBB SHADOW E&M-EST. PATIENT-LVL IV: CPT | Mod: PBBFAC,,, | Performed by: INTERNAL MEDICINE

## 2023-05-08 PROCEDURE — 3078F DIAST BP <80 MM HG: CPT | Mod: CPTII,S$GLB,, | Performed by: INTERNAL MEDICINE

## 2023-05-08 PROCEDURE — 1159F MED LIST DOCD IN RCRD: CPT | Mod: CPTII,S$GLB,, | Performed by: INTERNAL MEDICINE

## 2023-05-08 PROCEDURE — 4010F PR ACE/ARB THEARPY RXD/TAKEN: ICD-10-PCS | Mod: CPTII,S$GLB,, | Performed by: INTERNAL MEDICINE

## 2023-05-08 PROCEDURE — 3008F BODY MASS INDEX DOCD: CPT | Mod: CPTII,S$GLB,, | Performed by: INTERNAL MEDICINE

## 2023-05-08 PROCEDURE — 3074F PR MOST RECENT SYSTOLIC BLOOD PRESSURE < 130 MM HG: ICD-10-PCS | Mod: CPTII,S$GLB,, | Performed by: INTERNAL MEDICINE

## 2023-05-08 PROCEDURE — 99999 PR PBB SHADOW E&M-EST. PATIENT-LVL IV: ICD-10-PCS | Mod: PBBFAC,,, | Performed by: INTERNAL MEDICINE

## 2023-05-08 PROCEDURE — 4010F ACE/ARB THERAPY RXD/TAKEN: CPT | Mod: CPTII,S$GLB,, | Performed by: INTERNAL MEDICINE

## 2023-05-08 PROCEDURE — 99213 PR OFFICE/OUTPT VISIT, EST, LEVL III, 20-29 MIN: ICD-10-PCS | Mod: S$GLB,,, | Performed by: INTERNAL MEDICINE

## 2023-05-08 PROCEDURE — 99213 OFFICE O/P EST LOW 20 MIN: CPT | Mod: S$GLB,,, | Performed by: INTERNAL MEDICINE

## 2023-05-08 RX ORDER — ESCITALOPRAM OXALATE 10 MG/1
TABLET ORAL
COMMUNITY
Start: 2023-04-14

## 2023-05-08 RX ORDER — AMLODIPINE BESYLATE 5 MG/1
TABLET ORAL
Status: ON HOLD | COMMUNITY
Start: 2022-10-17 | End: 2023-09-14 | Stop reason: HOSPADM

## 2023-05-08 RX ORDER — CEFADROXIL 500 MG/1
1 CAPSULE ORAL EVERY 12 HOURS
Qty: 120 CAPSULE | Refills: 11 | Status: SHIPPED | OUTPATIENT
Start: 2023-05-08 | End: 2024-05-07

## 2023-05-08 NOTE — PROGRESS NOTES
Subjective:    Patient ID:  Naina Keyes is a 62 y.o. female who presents for follow-up of Ascending aortic aneurysm repair and mechanical AVR.    REF:  Kenny Newsome     HPI 62 y/o lady with hx of AVR with a mechanical valve (1997) and aortic tube graft for endocarditis in 2007.  Post operatively she had a proximal anastomosis leak treated with a 10mm  Amplatzer Septal Occluder in 2009. She also has a stable ascending aortic aneurysm at 4 cm. Both Codie and Juan Ramon think she is surgically inoperable because her aorta is stuck against the sternum.     Interval History: She comes to clinic today for one year follow up. 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 Today she is asymptomatic and afebrile.  Still working.      Review of Systems   Constitution: Negative for fever, chills, decreased appetite, diaphoresis and fever.   HENT: Negative.    Cardiovascular: Positive for dyspnea on exertion. Negative for chest pain, orthopnea, palpitations and syncope.   Respiratory: Positive for shortness of breath. Negative for sleep disturbances due to breathing, snoring, sputum production and wheezing.    Musculoskeletal: Negative.    Gastrointestinal: Negative for bloating, abdominal pain, constipation, diarrhea, nausea and vomiting.   Neurological: Negative for dizziness, headaches, light-headedness and weakness.   Psychiatric/Behavioral: Negative.    Past Medical History:   Diagnosis Date    Heart disease     Hypertension     Pacemaker 11/25/2013    S/P Mechanical AVR 11/25/2013    S/P thoracic aortic aneurysm repair with Dacron graft (2007) 11/25/2013    S/P VSD surgical patch repair -1997 11/25/2013     Current Outpatient Medications on File Prior to Visit   Medication Sig Dispense Refill    acetaminophen (TYLENOL) 500 MG tablet Take 1,000 mg by mouth daily as needed (pain/fever).      ALBUTEROL SULFATE  "(VENTOLIN HFA INHL) Inhale 1 puff into the lungs as needed (wheezing/shortness of breath).      alprazolam (XANAX) 0.25 MG tablet Take 0.25 mg by mouth 2 (two) times daily as needed for Anxiety.      loratadine (CLARITIN) 10 mg tablet TAKE ONE TABLET BY MOUTH DAILY AS NEEDED FOR ALLERGIES      metoprolol succinate (TOPROL-XL) 50 MG 24 hr tablet Take 1 tablet every Morning and take 2 tablets in the evening      ofloxacin (FLOXIN) 0.3 % otic solution 5 drops once daily.      potassium chloride (MICRO-K) 10 MEQ CpSR Take 10 mEq by mouth 2 (two) times daily.       prednisoLONE acetate (PRED FORTE) 1 % DrpS Place 1 drop into the right eye 4 (four) times daily.      sotaloL (BETAPACE) 80 MG tablet Take 1 tablet (80 mg total) by mouth 2 (two) times daily. Please HOLD taking Sotalol until follow up with cardiologist outpatient. 30 tablet 0    valACYclovir (VALTREX) 1000 MG tablet Take 1 tablet (1,000 mg total) by mouth 2 (two) times daily. for 5 days 10 tablet 0    valsartan (DIOVAN) 320 MG tablet Take 160 mg by mouth every 12 (twelve) hours. 1/2 in the morning 1/2 at night      warfarin (COUMADIN) 5 MG tablet On 5/23-5/24, take 1.5 tablets (7.5 mg) by mouth daily. Starting 5/25, take 1 tablet (5 mg) by mouth daily or as directed by coumadin clinic      zolpidem (AMBIEN) 10 mg Tab Take 10 mg by mouth every evening.      [DISCONTINUED] cefadroxil (DURICEF) 500 MG Cap Take 2 capsules (1 g total) by mouth every 12 (twelve) hours. 120 capsule 11     No current facility-administered medications on file prior to visit.     Vitals:    05/08/23 0948 05/08/23 0952   BP: (!) 119/58 (!) 118/58   BP Location: Right arm Left arm   Patient Position: Sitting Sitting   BP Method: Large (Automatic) Large (Automatic)   Pulse: 68 68   SpO2: 95%    Weight: 68.3 kg (150 lb 9.2 oz)    Height: 5' 3" (1.6 m)      Body mass index is 26.67 kg/m².          Objective:    Physical Exam   Constitutional: She is oriented to person, place, and time. She " appears well-developed and well-nourished. No distress.   HENT:   Head: Normocephalic and atraumatic.   Mouth/Throat: No oropharyngeal exudate.   Eyes: Conjunctivae and EOM are normal. No scleral icterus.   Neck: Normal range of motion. Neck supple. No JVD present.   Cardiovascular: Normal rate and regular rhythm.   Murmur heard.  Pulmonary/Chest: Effort normal and breath sounds normal. No respiratory distress. She has no wheezes.   Abdominal: Soft. Bowel sounds are normal. She exhibits no distension. There is no tenderness.   Musculoskeletal: Normal range of motion. She exhibits no edema.   Neurological: She is alert and oriented to person, place, and time.   Skin: Skin is warm and dry. No rash noted. She is not diaphoretic. No erythema.   Psychiatric: She has a normal mood and affect. Her behavior is normal. Judgment normal.   Nursing note and vitals reviewed.    Hemolysis labs 2020: LDH and retic count normal, haptoglobin low.    CT Scan Today: Complex aneurysm repair with dissection outside of the repair with Fistula between AO and RA.  Appears stable to me.      Assessment:       . S/P thoracic aortic aneurysm repair with Dacron graft (2007) with subsequent left post suture line aortic leak and peter fistula communicating to right atrium s/p 10mm AGA septal occluder placed with partial closure of leak in June 2009   2. 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 On Coumadin.   3. Pacemaker    4. S/P VSD surgical patch repair -1997    5. Ascending aortic aneurysm - 4 cm, stable         Plan:       Continue conservative management and chronic suppressive therapy.  Patient is not a surgical candidate.   Annual follow up with CT non contrast for ascending aortic aneurysm.

## 2023-09-09 ENCOUNTER — NURSE TRIAGE (OUTPATIENT)
Dept: ADMINISTRATIVE | Facility: CLINIC | Age: 63
End: 2023-09-09
Payer: MEDICARE

## 2023-09-09 ENCOUNTER — HOSPITAL ENCOUNTER (INPATIENT)
Facility: HOSPITAL | Age: 63
LOS: 5 days | Discharge: HOME | DRG: 308 | End: 2023-09-14
Attending: EMERGENCY MEDICINE | Admitting: STUDENT IN AN ORGANIZED HEALTH CARE EDUCATION/TRAINING PROGRAM
Payer: MEDICARE

## 2023-09-09 DIAGNOSIS — Z95.0 PACEMAKER: Chronic | ICD-10-CM

## 2023-09-09 DIAGNOSIS — I50.9 ACUTE DECOMPENSATED HEART FAILURE: ICD-10-CM

## 2023-09-09 DIAGNOSIS — K92.1 GASTROINTESTINAL HEMORRHAGE WITH MELENA: ICD-10-CM

## 2023-09-09 DIAGNOSIS — R00.2 PALPITATIONS: ICD-10-CM

## 2023-09-09 DIAGNOSIS — I71.21 ANEURYSM OF ASCENDING AORTA WITHOUT RUPTURE: ICD-10-CM

## 2023-09-09 DIAGNOSIS — Z79.01 WARFARIN ANTICOAGULATION: Chronic | ICD-10-CM

## 2023-09-09 DIAGNOSIS — I48.91 ATRIAL FIBRILLATION WITH RVR: Primary | ICD-10-CM

## 2023-09-09 DIAGNOSIS — Z95.2 HISTORY OF MECHANICAL AORTIC VALVE REPLACEMENT: ICD-10-CM

## 2023-09-09 PROBLEM — I10 ESSENTIAL HYPERTENSION: Status: ACTIVE | Noted: 2023-09-09

## 2023-09-09 LAB
ABO + RH BLD: NORMAL
ALBUMIN SERPL BCP-MCNC: 4 G/DL (ref 3.5–5.2)
ALP SERPL-CCNC: 108 U/L (ref 55–135)
ALT SERPL W/O P-5'-P-CCNC: 25 U/L (ref 10–44)
ANION GAP SERPL CALC-SCNC: 11 MMOL/L (ref 8–16)
AST SERPL-CCNC: 32 U/L (ref 10–40)
BACTERIA #/AREA URNS AUTO: ABNORMAL /HPF
BASOPHILS # BLD AUTO: 0.08 K/UL (ref 0–0.2)
BASOPHILS NFR BLD: 0.9 % (ref 0–1.9)
BILIRUB SERPL-MCNC: 1.8 MG/DL (ref 0.1–1)
BILIRUB UR QL STRIP: NEGATIVE
BLD GP AB SCN CELLS X3 SERPL QL: NORMAL
BNP SERPL-MCNC: 1157 PG/ML (ref 0–99)
BUN SERPL-MCNC: 20 MG/DL (ref 8–23)
CALCIUM SERPL-MCNC: 9.2 MG/DL (ref 8.7–10.5)
CHLORIDE SERPL-SCNC: 107 MMOL/L (ref 95–110)
CLARITY UR REFRACT.AUTO: CLEAR
CO2 SERPL-SCNC: 20 MMOL/L (ref 23–29)
COLOR UR AUTO: YELLOW
CREAT SERPL-MCNC: 1 MG/DL (ref 0.5–1.4)
D DIMER PPP IA.FEU-MCNC: 0.45 MG/L FEU
DIFFERENTIAL METHOD: ABNORMAL
EOSINOPHIL # BLD AUTO: 0.1 K/UL (ref 0–0.5)
EOSINOPHIL NFR BLD: 1.6 % (ref 0–8)
ERYTHROCYTE [DISTWIDTH] IN BLOOD BY AUTOMATED COUNT: 14.2 % (ref 11.5–14.5)
ERYTHROCYTE [DISTWIDTH] IN BLOOD BY AUTOMATED COUNT: 14.4 % (ref 11.5–14.5)
EST. GFR  (NO RACE VARIABLE): >60 ML/MIN/1.73 M^2
GLUCOSE SERPL-MCNC: 95 MG/DL (ref 70–110)
GLUCOSE UR QL STRIP: NEGATIVE
HCT VFR BLD AUTO: 35 % (ref 37–48.5)
HCT VFR BLD AUTO: 37.2 % (ref 37–48.5)
HCV AB SERPL QL IA: NORMAL
HGB BLD-MCNC: 12.4 G/DL (ref 12–16)
HGB BLD-MCNC: 12.5 G/DL (ref 12–16)
HGB UR QL STRIP: ABNORMAL
HIV 1+2 AB+HIV1 P24 AG SERPL QL IA: NORMAL
HYALINE CASTS UR QL AUTO: 4 /LPF
IMM GRANULOCYTES # BLD AUTO: 0.02 K/UL (ref 0–0.04)
IMM GRANULOCYTES NFR BLD AUTO: 0.2 % (ref 0–0.5)
INR PPP: 2.8 (ref 0.8–1.2)
KETONES UR QL STRIP: NEGATIVE
LACTATE SERPL-SCNC: 1.3 MMOL/L (ref 0.5–2.2)
LEUKOCYTE ESTERASE UR QL STRIP: ABNORMAL
LYMPHOCYTES # BLD AUTO: 1.8 K/UL (ref 1–4.8)
LYMPHOCYTES NFR BLD: 20 % (ref 18–48)
MCH RBC QN AUTO: 34.5 PG (ref 27–31)
MCH RBC QN AUTO: 37 PG (ref 27–31)
MCHC RBC AUTO-ENTMCNC: 33.6 G/DL (ref 32–36)
MCHC RBC AUTO-ENTMCNC: 35.4 G/DL (ref 32–36)
MCV RBC AUTO: 103 FL (ref 82–98)
MCV RBC AUTO: 105 FL (ref 82–98)
MICROSCOPIC COMMENT: ABNORMAL
MONOCYTES # BLD AUTO: 0.7 K/UL (ref 0.3–1)
MONOCYTES NFR BLD: 7.4 % (ref 4–15)
NEUTROPHILS # BLD AUTO: 6.2 K/UL (ref 1.8–7.7)
NEUTROPHILS NFR BLD: 69.9 % (ref 38–73)
NITRITE UR QL STRIP: NEGATIVE
NRBC BLD-RTO: 0 /100 WBC
PH UR STRIP: 5 [PH] (ref 5–8)
PLATELET # BLD AUTO: 236 K/UL (ref 150–450)
PLATELET # BLD AUTO: 245 K/UL (ref 150–450)
PMV BLD AUTO: 10.2 FL (ref 9.2–12.9)
PMV BLD AUTO: 10.4 FL (ref 9.2–12.9)
POTASSIUM SERPL-SCNC: 4.8 MMOL/L (ref 3.5–5.1)
PROT SERPL-MCNC: 7 G/DL (ref 6–8.4)
PROT UR QL STRIP: ABNORMAL
PROTHROMBIN TIME: 28 SEC (ref 9–12.5)
RBC # BLD AUTO: 3.35 M/UL (ref 4–5.4)
RBC # BLD AUTO: 3.62 M/UL (ref 4–5.4)
RBC #/AREA URNS AUTO: 3 /HPF (ref 0–4)
SARS-COV-2 RDRP RESP QL NAA+PROBE: NEGATIVE
SODIUM SERPL-SCNC: 138 MMOL/L (ref 136–145)
SP GR UR STRIP: 1.02 (ref 1–1.03)
SPECIMEN OUTDATE: NORMAL
SQUAMOUS #/AREA URNS AUTO: 7 /HPF
TROPONIN I SERPL DL<=0.01 NG/ML-MCNC: <0.006 NG/ML (ref 0–0.03)
TSH SERPL DL<=0.005 MIU/L-ACNC: 1.02 UIU/ML (ref 0.4–4)
URN SPEC COLLECT METH UR: ABNORMAL
WBC # BLD AUTO: 8.39 K/UL (ref 3.9–12.7)
WBC # BLD AUTO: 8.8 K/UL (ref 3.9–12.7)
WBC #/AREA URNS AUTO: 5 /HPF (ref 0–5)

## 2023-09-09 PROCEDURE — 96375 TX/PRO/DX INJ NEW DRUG ADDON: CPT

## 2023-09-09 PROCEDURE — 86901 BLOOD TYPING SEROLOGIC RH(D): CPT | Performed by: STUDENT IN AN ORGANIZED HEALTH CARE EDUCATION/TRAINING PROGRAM

## 2023-09-09 PROCEDURE — 86803 HEPATITIS C AB TEST: CPT | Performed by: PHYSICIAN ASSISTANT

## 2023-09-09 PROCEDURE — 83605 ASSAY OF LACTIC ACID: CPT | Performed by: EMERGENCY MEDICINE

## 2023-09-09 PROCEDURE — 81001 URINALYSIS AUTO W/SCOPE: CPT | Performed by: EMERGENCY MEDICINE

## 2023-09-09 PROCEDURE — 25000003 PHARM REV CODE 250

## 2023-09-09 PROCEDURE — 93010 ELECTROCARDIOGRAM REPORT: CPT | Mod: ,,, | Performed by: INTERNAL MEDICINE

## 2023-09-09 PROCEDURE — 85610 PROTHROMBIN TIME: CPT | Performed by: EMERGENCY MEDICINE

## 2023-09-09 PROCEDURE — 96376 TX/PRO/DX INJ SAME DRUG ADON: CPT

## 2023-09-09 PROCEDURE — 80053 COMPREHEN METABOLIC PANEL: CPT | Performed by: EMERGENCY MEDICINE

## 2023-09-09 PROCEDURE — 25000003 PHARM REV CODE 250: Performed by: EMERGENCY MEDICINE

## 2023-09-09 PROCEDURE — 96361 HYDRATE IV INFUSION ADD-ON: CPT

## 2023-09-09 PROCEDURE — 83880 ASSAY OF NATRIURETIC PEPTIDE: CPT | Performed by: EMERGENCY MEDICINE

## 2023-09-09 PROCEDURE — C9113 INJ PANTOPRAZOLE SODIUM, VIA: HCPCS

## 2023-09-09 PROCEDURE — 85379 FIBRIN DEGRADATION QUANT: CPT | Performed by: EMERGENCY MEDICINE

## 2023-09-09 PROCEDURE — 99285 EMERGENCY DEPT VISIT HI MDM: CPT | Mod: 25

## 2023-09-09 PROCEDURE — U0002 COVID-19 LAB TEST NON-CDC: HCPCS | Performed by: EMERGENCY MEDICINE

## 2023-09-09 PROCEDURE — 93010 EKG 12-LEAD: ICD-10-PCS | Mod: ,,, | Performed by: INTERNAL MEDICINE

## 2023-09-09 PROCEDURE — 93005 ELECTROCARDIOGRAM TRACING: CPT

## 2023-09-09 PROCEDURE — 63600175 PHARM REV CODE 636 W HCPCS: Mod: UD

## 2023-09-09 PROCEDURE — 87389 HIV-1 AG W/HIV-1&-2 AB AG IA: CPT | Performed by: PHYSICIAN ASSISTANT

## 2023-09-09 PROCEDURE — 20000000 HC ICU ROOM

## 2023-09-09 PROCEDURE — 85027 COMPLETE CBC AUTOMATED: CPT | Performed by: EMERGENCY MEDICINE

## 2023-09-09 PROCEDURE — 96374 THER/PROPH/DIAG INJ IV PUSH: CPT

## 2023-09-09 PROCEDURE — 84443 ASSAY THYROID STIM HORMONE: CPT | Performed by: EMERGENCY MEDICINE

## 2023-09-09 PROCEDURE — 84484 ASSAY OF TROPONIN QUANT: CPT | Performed by: EMERGENCY MEDICINE

## 2023-09-09 PROCEDURE — 85025 COMPLETE CBC W/AUTO DIFF WBC: CPT | Performed by: STUDENT IN AN ORGANIZED HEALTH CARE EDUCATION/TRAINING PROGRAM

## 2023-09-09 RX ORDER — ACETAMINOPHEN 325 MG/1
650 TABLET ORAL EVERY 6 HOURS PRN
Status: DISCONTINUED | OUTPATIENT
Start: 2023-09-10 | End: 2023-09-14 | Stop reason: HOSPADM

## 2023-09-09 RX ORDER — CEFADROXIL 500 MG/1
1 CAPSULE ORAL EVERY 12 HOURS
Status: DISCONTINUED | OUTPATIENT
Start: 2023-09-09 | End: 2023-09-12

## 2023-09-09 RX ORDER — ESCITALOPRAM OXALATE 10 MG/1
10 TABLET ORAL NIGHTLY
Status: DISCONTINUED | OUTPATIENT
Start: 2023-09-09 | End: 2023-09-14 | Stop reason: HOSPADM

## 2023-09-09 RX ORDER — METOPROLOL TARTRATE 50 MG/1
50 TABLET ORAL 4 TIMES DAILY
Status: DISCONTINUED | OUTPATIENT
Start: 2023-09-09 | End: 2023-09-10

## 2023-09-09 RX ORDER — SODIUM CHLORIDE 0.9 % (FLUSH) 0.9 %
10 SYRINGE (ML) INJECTION
Status: DISCONTINUED | OUTPATIENT
Start: 2023-09-10 | End: 2023-09-14 | Stop reason: HOSPADM

## 2023-09-09 RX ORDER — METOPROLOL SUCCINATE 50 MG/1
100 TABLET, EXTENDED RELEASE ORAL NIGHTLY
Status: DISCONTINUED | OUTPATIENT
Start: 2023-09-09 | End: 2023-09-09

## 2023-09-09 RX ORDER — DILTIAZEM HYDROCHLORIDE 5 MG/ML
10 INJECTION INTRAVENOUS
Status: COMPLETED | OUTPATIENT
Start: 2023-09-09 | End: 2023-09-09

## 2023-09-09 RX ORDER — PANTOPRAZOLE SODIUM 40 MG/10ML
40 INJECTION, POWDER, LYOPHILIZED, FOR SOLUTION INTRAVENOUS 2 TIMES DAILY
Status: DISCONTINUED | OUTPATIENT
Start: 2023-09-10 | End: 2023-09-12

## 2023-09-09 RX ORDER — METOPROLOL TARTRATE 1 MG/ML
5 INJECTION, SOLUTION INTRAVENOUS
Status: COMPLETED | OUTPATIENT
Start: 2023-09-09 | End: 2023-09-09

## 2023-09-09 RX ORDER — DILTIAZEM HCL 1 MG/ML
0-15 INJECTION, SOLUTION INTRAVENOUS CONTINUOUS
Status: DISCONTINUED | OUTPATIENT
Start: 2023-09-09 | End: 2023-09-10

## 2023-09-09 RX ORDER — METOPROLOL SUCCINATE 50 MG/1
50 TABLET, EXTENDED RELEASE ORAL DAILY
Status: DISCONTINUED | OUTPATIENT
Start: 2023-09-10 | End: 2023-09-09

## 2023-09-09 RX ORDER — PROCHLORPERAZINE EDISYLATE 5 MG/ML
5 INJECTION INTRAMUSCULAR; INTRAVENOUS EVERY 6 HOURS PRN
Status: DISCONTINUED | OUTPATIENT
Start: 2023-09-10 | End: 2023-09-14 | Stop reason: HOSPADM

## 2023-09-09 RX ORDER — METOPROLOL SUCCINATE 50 MG/1
100 TABLET, EXTENDED RELEASE ORAL
Status: COMPLETED | OUTPATIENT
Start: 2023-09-09 | End: 2023-09-09

## 2023-09-09 RX ORDER — MORPHINE SULFATE 2 MG/ML
2 INJECTION, SOLUTION INTRAMUSCULAR; INTRAVENOUS EVERY 4 HOURS PRN
Status: DISCONTINUED | OUTPATIENT
Start: 2023-09-10 | End: 2023-09-14 | Stop reason: HOSPADM

## 2023-09-09 RX ORDER — WARFARIN 2.5 MG/1
5 TABLET ORAL DAILY
Status: DISCONTINUED | OUTPATIENT
Start: 2023-09-10 | End: 2023-09-10

## 2023-09-09 RX ORDER — TALC
9 POWDER (GRAM) TOPICAL NIGHTLY PRN
Status: DISCONTINUED | OUTPATIENT
Start: 2023-09-10 | End: 2023-09-14 | Stop reason: HOSPADM

## 2023-09-09 RX ORDER — FUROSEMIDE 10 MG/ML
40 INJECTION INTRAMUSCULAR; INTRAVENOUS DAILY
Status: DISCONTINUED | OUTPATIENT
Start: 2023-09-09 | End: 2023-09-10

## 2023-09-09 RX ORDER — PANTOPRAZOLE SODIUM 40 MG/10ML
80 INJECTION, POWDER, LYOPHILIZED, FOR SOLUTION INTRAVENOUS ONCE
Status: COMPLETED | OUTPATIENT
Start: 2023-09-09 | End: 2023-09-09

## 2023-09-09 RX ADMIN — METOPROLOL TARTRATE 50 MG: 50 TABLET, FILM COATED ORAL at 08:09

## 2023-09-09 RX ADMIN — METOROPROLOL TARTRATE 5 MG: 5 INJECTION, SOLUTION INTRAVENOUS at 02:09

## 2023-09-09 RX ADMIN — DILTIAZEM HYDROCHLORIDE 5 MG/HR: 5 INJECTION INTRAVENOUS at 05:09

## 2023-09-09 RX ADMIN — DILTIAZEM HYDROCHLORIDE 10 MG: 5 INJECTION INTRAVENOUS at 04:09

## 2023-09-09 RX ADMIN — CEFADROXIL 1 G: 500 CAPSULE ORAL at 10:09

## 2023-09-09 RX ADMIN — SODIUM CHLORIDE 1000 ML: 9 INJECTION, SOLUTION INTRAVENOUS at 11:09

## 2023-09-09 RX ADMIN — MORPHINE SULFATE 2 MG: 2 INJECTION, SOLUTION INTRAMUSCULAR; INTRAVENOUS at 11:09

## 2023-09-09 RX ADMIN — METOPROLOL SUCCINATE 100 MG: 50 TABLET, EXTENDED RELEASE ORAL at 05:09

## 2023-09-09 RX ADMIN — FUROSEMIDE 40 MG: 10 INJECTION, SOLUTION INTRAVENOUS at 11:09

## 2023-09-09 RX ADMIN — METOROPROLOL TARTRATE 5 MG: 5 INJECTION, SOLUTION INTRAVENOUS at 11:09

## 2023-09-09 RX ADMIN — PANTOPRAZOLE SODIUM 80 MG: 40 INJECTION, POWDER, FOR SOLUTION INTRAVENOUS at 11:09

## 2023-09-09 NOTE — TELEPHONE ENCOUNTER
Reason for Disposition   Shock suspected (e.g., cold/pale/clammy skin, too weak to stand, low BP, rapid pulse)    Additional Information   Negative: Passed out (i.e., lost consciousness, collapsed and was not responding)    Protocols used: Heart Rate and Heartbeat [id number]    Naina called to say she is having rapid pulse and low BP. Pulse 130, SBP 90. Feeling tired and out of breath, very weak. Has been feeling worse each day, she states.  Very anxious. Pacemaker in situ and mechanical valve. States she is still on twice daily antibiotics (Dr Matt Gutierrez, ID) since last year for bacterial endocarditis. Audibly SOB during this call.  Per [facility] triage protocol recommend hang up now and call 911 for immediate medical attention.  She states she will.  Message to Dr Colton Gallegos, cardiology. Please contact caller directly with any additional care advice. She states her pcp Anjana Torres, NP, is no longer practicing at the clinic she was seeing her in, and she has not yest est care with a new primary care provider.

## 2023-09-09 NOTE — HPI
"Naina Keyes is a 63 yo F with an extensive cardiac history including mechanical AVR 2/2 bacterial endocarditis, TAA repair with Dacron graft, VSD surgical patch repair, s/p pacemaker, pAF, and HTN who is being admitted to MICU for further management of a-fib with RVR requiring titrating diltiazem drip. She initially presented to the ED complaining of intermittent palpitations x 1 week. During the episodes of palpitations, she also experienced concomitant SOB, nausea, fatigue, and generalized weakness. This morning, her symptoms were sustained and more severe, which prompted her to come to the ED. During our interview, she additionally mentioned that she has been experiencing epigastric abdominal pain described as "feeling my pulse in my stomach." She is on chronic anticoagulation and chronic antibiotics, denies missing any doses.     Upon arrival to the ED, BP 99/62, , RR 20, & SpO2 100% on RA. On exam, she had an irregularly irregular heart beat and was tachycardic. Significant labs: Trop negative, BNP 1157, D-dimer 0.45, lactate 1.3, T bili 1.8, CBC unremarkable. EKG with a-fib with RVR; no STEMI. CXR with no acute process. CT chest with bilateral small pleural effusions & findings possibly due to increased pulmonary venous pressure. She was given 1 L NS bolus, metoprolol 5 mg IV x 3 (11:50, 14:02, 14:43), diltiazem 10 mg IV x 1, and Toprol 100 mg x 1.   "

## 2023-09-09 NOTE — SUBJECTIVE & OBJECTIVE
Past Medical History:   Diagnosis Date    Heart disease     Hypertension     Pacemaker 2013    S/P Mechanical AVR 2013    S/P thoracic aortic aneurysm repair with Dacron graft () 2013    S/P VSD surgical patch repair -2013       Past Surgical History:   Procedure Laterality Date    ASCENDING AORTIC ANEURYSM REPAIR      BREAST SURGERY      CARDIAC CATHETERIZATION      CARDIAC PACEMAKER PLACEMENT      HYSTERECTOMY      MECHANICAL AORTIC VALVE REPLACEMENT         Review of patient's allergies indicates:   Allergen Reactions    Iodine Anaphylaxis     contrast    Lidocaine Anaphylaxis     cetacaine spray       Family History       Problem Relation (Age of Onset)    Arrhythmia Mother    Heart disease Mother, Father    Heart failure Mother    Hypertension Mother, Father          Tobacco Use    Smoking status: Former     Current packs/day: 0.00     Types: Cigarettes     Quit date: 2009     Years since quittin.8    Smokeless tobacco: Never   Substance and Sexual Activity    Alcohol use: No    Drug use: No    Sexual activity: Not on file      Review of Systems   Constitutional:  Positive for fatigue. Negative for chills and fever.   Respiratory:  Positive for shortness of breath.    Cardiovascular:  Positive for palpitations. Negative for chest pain.   Gastrointestinal:  Positive for abdominal pain, nausea and vomiting.   Musculoskeletal:  Positive for back pain.   Neurological:  Positive for weakness. Negative for light-headedness and headaches.     Objective:     Vital Signs (Most Recent):  Temp: 97.9 °F (36.6 °C) (23 1034)  Pulse: (!) 139 (23 183)  Resp: 20 (23)  BP: 122/72 (23)  SpO2: 99 % (23) Vital Signs (24h Range):  Temp:  [97.9 °F (36.6 °C)] 97.9 °F (36.6 °C)  Pulse:  [109-145] 139  Resp:  [13-20] 20  SpO2:  [95 %-100 %] 99 %  BP: ()/(60-83) 122/72   Weight: 68 kg (150 lb)  Body mass index is 26.57 kg/m².    No intake or  output data in the 24 hours ending 09/09/23 8797       Physical Exam  Vitals and nursing note reviewed.   Constitutional:       General: She is not in acute distress.     Appearance: Normal appearance.   HENT:      Head: Normocephalic and atraumatic.   Cardiovascular:      Rate and Rhythm: Tachycardia present. Rhythm irregular.   Pulmonary:      Effort: Pulmonary effort is normal.      Breath sounds: Normal breath sounds.   Abdominal:      General: Abdomen is flat. There is no distension.      Palpations: Abdomen is soft.      Comments: No epigastric bruit noted.    Neurological:      General: No focal deficit present.      Mental Status: She is alert and oriented to person, place, and time.            Vents:     Lines/Drains/Airways       Peripherally Inserted Central Catheter Line  Duration             PICC Double Lumen 05/23/22 1440 right basilic 474 days              Peripheral Intravenous Line  Duration                  Peripheral IV - Single Lumen 09/09/23 1149 20 G Posterior;Right Hand <1 day                  Significant Labs:    CBC/Anemia Profile:  Recent Labs   Lab 09/09/23  1144   WBC 8.39   HGB 12.4   HCT 35.0*      *   RDW 14.2        Chemistries:  Recent Labs   Lab 09/09/23  1144      K 4.8      CO2 20*   BUN 20   CREATININE 1.0   CALCIUM 9.2   ALBUMIN 4.0   PROT 7.0   BILITOT 1.8*   ALKPHOS 108   ALT 25   AST 32       All pertinent labs within the past 24 hours have been reviewed.    Significant Imaging: I have reviewed all pertinent imaging results/findings within the past 24 hours.

## 2023-09-09 NOTE — ED PROVIDER NOTES
Emergency Department Encounter  Provider Note    Naina Keyes  [id number]  9/9/2023    Evaluation:    History Acquisition:     Chief Complaint   Patient presents with    Palpitations     Palpitations, nausea, fatigue, SOB x1 week       History of Present Illness:  Naina Keyes is a 62 y.o. female who has a past medical history of *Atrial fibrillation, Heart disease, Hypertension, Pacemaker (11/25/2013), S/P Mechanical AVR (11/25/2013), S/P thoracic aortic aneurysm repair with Dacron graft (2007) (11/25/2013), and S/P VSD surgical patch repair -1997 (11/25/2013).    The patient presents to the ED due to palpitations.   Patient reports symptoms started about a week ago.  She states she has felt intermittent elevation in her heart rate, accompanied by nausea, fatigue, and generalized weakness.  She states this morning her heart rate has been persistently elevated.  She feels a dull pressure in her chest.  She reports nausea with an episode of vomiting this morning after taking her medications on an empty stomach.  She reports diffuse weakness and fatigue today as well.  She also feels short of breath.  She denies any fever, cough, diarrhea, abdominal pain, radiation of pain to her arms, neck, or back, leg pain/swelling, or any other concerns.    She has an extensive cardiac history and is followed by Dr. Gallegos.  She has a history of thoracic aortic aneurysm repair, aortic valve replacement, endocarditis, pacemaker, paroxysmal A-Fib, and is on chronic anticoagulation as well as chronic antibiotics.  She denies missing any medication doses.    Additional historians utilized:  Friend at bedside, states she vomited her home medications this AM.     Prior medical records were reviewed:   Cards visit 5/8 for f/u ascending aortic aneurysm repair, pacemaker, mechanical AVR    The patient's list of active medical problems, social history, medications, and allergies as documented has been reviewed.     Past Medical History:    Diagnosis Date    *Atrial fibrillation     Heart disease     Hypertension     Pacemaker 2013    S/P Mechanical AVR 2013    S/P thoracic aortic aneurysm repair with Dacron graft () 2013    S/P VSD surgical patch repair -2013     Past Surgical History:   Procedure Laterality Date    ASCENDING AORTIC ANEURYSM REPAIR      BREAST SURGERY      CARDIAC CATHETERIZATION      CARDIAC PACEMAKER PLACEMENT      HYSTERECTOMY      MECHANICAL AORTIC VALVE REPLACEMENT       Family History   Problem Relation Age of Onset    Hypertension Mother     Heart disease Mother     Heart failure Mother     Arrhythmia Mother     Hypertension Father     Heart disease Father     Anesthesia problems Neg Hx     Clotting disorder Neg Hx      Social History     Socioeconomic History    Marital status: Single   Tobacco Use    Smoking status: Former     Current packs/day: 0.00     Types: Cigarettes     Quit date: 2009     Years since quittin.8    Smokeless tobacco: Never   Substance and Sexual Activity    Alcohol use: No    Drug use: No     Review of patient's allergies indicates:   Allergen Reactions    Iodine Anaphylaxis     contrast    Lidocaine Anaphylaxis     cetacaine spray       Review of Systems   Constitutional:  Positive for fatigue.   Respiratory:  Positive for shortness of breath.    Cardiovascular:  Positive for chest pain.   Neurological:  Positive for weakness.         Physical Exam:     Initial Vitals [23 1034]   BP Pulse Resp Temp SpO2   99/62 (!) 145 20 97.9 °F (36.6 °C) 100 %      MAP       --         Physical Exam    Nursing note and vitals reviewed.  Constitutional: She appears well-developed and well-nourished. She is not diaphoretic. No distress.   HENT:   Head: Normocephalic and atraumatic.   Mouth/Throat: Oropharynx is clear and moist.   Eyes: EOM are normal. Pupils are equal, round, and reactive to light.   Neck: No tracheal deviation present.   Cardiovascular:  Normal heart  sounds and intact distal pulses. An irregularly irregular rhythm present.   Tachycardia present.         Heart rate 130s.   Pulmonary/Chest: Breath sounds normal. No stridor. No respiratory distress. She has no wheezes.   Abdominal: Abdomen is soft. Bowel sounds are normal. She exhibits no distension and no mass. There is no abdominal tenderness.   Musculoskeletal:         General: No edema. Normal range of motion.     Neurological: She is alert and oriented to person, place, and time. She has normal strength. No cranial nerve deficit or sensory deficit.   Skin: Skin is warm and dry. Capillary refill takes less than 2 seconds. No pallor.   Psychiatric: She has a normal mood and affect. Her behavior is normal. Thought content normal.         ED Course:   Procedures  Medical Decision Making:    Differential Diagnoses:   Based on available information and initial assessment, Differential Diagnosis includes, but is not limited to:  ACS/MI, PE, aortic dissection, pneumothorax, cardiac tamponade, pericarditis/myocarditis, pneumonia, infection/abscess, lung mass, trauma/fracture, costochondritis/pleurisy, MSK pain/contusion, GERD, biliary disease, pancreatitis, anemia        EKG:   EKG interpretation by ED attending physician:  A-fib with RVR, occasional paced rhythm, rate 122, non-specific ST changes, no ST elevations or acute ischemia, normal intervals.  Compared with prior EKG dated 05/2022, a-fib with RVR has replaced paced rhythm.     Repeat EKG interpretation by ED attending physician:  A-fib with RVR, intermittent paced rhythm, rate 115, non-specific ST changes, noacute ischemia, normal intervals.  Compared with prior EKG dated earlier today, rate has improved, otherwise grossly stable without significant change.      Labs:     Labs Reviewed   CBC WITHOUT DIFFERENTIAL - Abnormal; Notable for the following components:       Result Value    RBC 3.35 (*)     Hematocrit 35.0 (*)      (*)     MCH 37.0 (*)     All  other components within normal limits   COMPREHENSIVE METABOLIC PANEL - Abnormal; Notable for the following components:    CO2 20 (*)     Total Bilirubin 1.8 (*)     All other components within normal limits   B-TYPE NATRIURETIC PEPTIDE - Abnormal; Notable for the following components:    BNP 1,157 (*)     All other components within normal limits   URINALYSIS, REFLEX TO URINE CULTURE - Abnormal; Notable for the following components:    Protein, UA Trace (*)     Occult Blood UA Trace (*)     Leukocytes, UA 3+ (*)     All other components within normal limits    Narrative:     Specimen Source->Urine   PROTIME-INR - Abnormal; Notable for the following components:    Prothrombin Time 28.0 (*)     INR 2.8 (*)     All other components within normal limits   URINALYSIS MICROSCOPIC - Abnormal; Notable for the following components:    Hyaline Casts, UA 4 (*)     All other components within normal limits    Narrative:     Specimen Source->Urine   TROPONIN I   D DIMER, QUANTITATIVE   LACTIC ACID, PLASMA   TSH   HIV 1 / 2 ANTIBODY    Narrative:     Release to patient->Immediate   HEPATITIS C ANTIBODY    Narrative:     Release to patient->Immediate   SARS-COV-2 RNA AMPLIFICATION, QUAL     Independent review of the labs ordered include:   See ED course    Imaging:     Imaging Results              CT Chest Without Contrast (Final result)  Result time 09/09/23 13:53:19   Procedure changed from CTA Chest Aorta Non Coronary     Final result by Amie Velasco MD (09/09/23 13:53:19)                   Impression:      Postoperative change of aortic valve replacement and vascular plugs of the ascending aorta, the aorta is unchanged from the prior study.    Subtle increase attenuation of the lung parenchyma in the bilateral perihilar region and mild increase conspicuity of the interlobular septa especially at the lung basis could be seen with mild increased pulmonary venous pressure.    Bilateral small pleural  "effusion.      Electronically signed by: Amie Velasco MD  Date:    09/09/2023  Time:    13:53               Narrative:    EXAMINATION:  CT CHEST WITHOUT CONTRAST    CLINICAL HISTORY:  "Aortic aneurysm, known or suspected;Aortic dissection suspected;"    COMPARISON:  05/16/2022 chest CT.    TECHNIQUE:  3.75 mm axial images of the chest obtained without the use of IV contrast, coronal and sagittal images reformatted.    FINDINGS:  The airways are patent.    Slightly enlarged pretracheal node 1.3 cm (02:47, likely reactive).    The ascending thoracic aorta measures 4.2 cm, the descending thoracic aorta measures 2.7 cm.  Mechanical aortic valve.  Vascular plug in the ascending aorta.  There is significant atherosclerotic plaque of the aorta    The cardiac silhouette appears slightly prominent in size, no pericardial effusion.  Coronary artery calcifications seen.    The esophagus appears normal in course and caliber.    Subtle perihilar area of increased ground-glass opacity and mild prominence of the interlobular septa at the lung basis could indicate mild increased pulmonary venous pressure.  No focal airspace disease.  No worrisome mass.  Trace of bilateral pleural fluid, new from the prior study.  The upper abdominal organs demonstrate a stable small liver lesion possibly a cyst.  Cholecystectomy clips.    The osseous structures demonstrate no osseous lesions.  There are sternotomy wires.    Bilateral breast implants.                                       X-Ray Chest AP Portable (Final result)  Result time 09/09/23 11:51:17      Final result by Colton Johansen MD (09/09/23 11:51:17)                   Impression:      No convincing evidence of acute detrimental change relative to prior radiograph 05/23/2022.      Electronically signed by: Coltno Johansen  Date:    09/09/2023  Time:    11:51               Narrative:    EXAMINATION:  XR CHEST AP PORTABLE    CLINICAL " HISTORY:  palpitations;    TECHNIQUE:  Single frontal view of the chest was performed.    COMPARISON:  Chest radiograph performed 05/03/2022, 15:08 hours.    FINDINGS:  Monitoring leads overlie the chest.  Status post median sternotomy, presumably for CABG.  Left subclavian approach pacer again seen.  Bilateral breast implants.    Grossly unchanged cardiomediastinal contours, again noting enlargement the cardiac silhouette and atherosclerosis of the aorta.    Coarse interstitial opacities appear relatively similar to remote study of 05/23/2022.    No definite pneumothorax or large volume pleural effusion.    No acute findings in the visualized abdomen    Osseous and soft tissue structures appear without definite acute change.                        Final result by Colton Johansen MD (09/09/23 11:51:17)                   Impression:      No convincing evidence of acute detrimental change relative to prior radiograph 05/23/2022.      Electronically signed by: Colton Johansen  Date:    09/09/2023  Time:    11:51               Narrative:    EXAMINATION:  XR CHEST AP PORTABLE    CLINICAL HISTORY:  palpitations;    TECHNIQUE:  Single frontal view of the chest was performed.    COMPARISON:  Chest radiograph performed 05/03/2022, 15:08 hours.    FINDINGS:  Monitoring leads overlie the chest.  Status post median sternotomy, presumably for CABG.  Left subclavian approach pacer again seen.  Bilateral breast implants.    Grossly unchanged cardiomediastinal contours, again noting enlargement the cardiac silhouette and atherosclerosis of the aorta.    Coarse interstitial opacities appear relatively similar to remote study of 05/23/2022.    No definite pneumothorax or large volume pleural effusion.    No acute findings in the visualized abdomen    Osseous and soft tissue structures appear without definite acute change.                                         Medications Given:     Medications   cefadroxil capsule 1 g (1 g Oral  Given 9/10/23 2103)   EScitalopram oxalate tablet 10 mg (10 mg Oral Not Given 9/10/23 2100)   pantoprazole injection 40 mg (40 mg Intravenous Given 9/10/23 2104)   sodium chloride 0.9% flush 10 mL (has no administration in time range)   prochlorperazine injection Soln 5 mg (5 mg Intravenous Given 9/10/23 0007)   morphine injection 2 mg (2 mg Intravenous Given 9/9/23 2345)   acetaminophen tablet 650 mg (has no administration in time range)   melatonin tablet 9 mg (has no administration in time range)   amiodarone 360 mg/200 mL (1.8 mg/mL) infusion (1 mg/min Intravenous New Bag 9/10/23 0837)   amiodarone 360 mg/200 mL (1.8 mg/mL) infusion (0.5 mg/min Intravenous New Bag 9/10/23 2004)   metoprolol succinate (TOPROL-XL) 24 hr tablet 100 mg (100 mg Oral Given 9/10/23 1233)   furosemide injection 40 mg (40 mg Intravenous Given 9/10/23 2103)   sodium chloride 0.9% bolus 1,000 mL 1,000 mL (0 mLs Intravenous Stopped 9/9/23 1441)   metoprolol injection 5 mg (5 mg Intravenous Given 9/9/23 1150)   metoprolol injection 5 mg (5 mg Intravenous Given 9/9/23 1402)   metoprolol injection 5 mg (5 mg Intravenous Given 9/9/23 1443)   diltiaZEM injection 10 mg (10 mg Intravenous Given 9/9/23 1625)   metoprolol succinate (TOPROL-XL) 24 hr tablet 100 mg (100 mg Oral Given 9/9/23 1741)   pantoprazole injection 80 mg (80 mg Intravenous Given 9/9/23 2344)   amiodarone in dextrose 150 mg/100 mL (1.5 mg/mL) loading dose 150 mg (0 mg Intravenous Stopped 9/10/23 0255)   magnesium sulfate 2g in water 50mL IVPB (premix) (0 g Intravenous Stopped 9/10/23 1143)        Additional Consideration:   Additional testing considered during clinical course: none    Social determinants of health considered during development of treatment plan include: none    Current co-morbidities considered which impacted clinical decision making: thoracic aortic aneurysm s/p repair, AVR, endocarditis, HTN, CHF, on anticoagulation    Case discussed with additional provider:    Cardiology, will evaluate and follow while inpatient  ICU service for admission and further management of a-fib RVR on diltiazem    ED Course as of 09/10/23 2232   Sat Sep 09, 2023   1258 SpO2: 100 % [SS]   1258 Resp: 20 [SS]   1258 Pulse(!): 145 [SS]   1258 Temp Source: Oral [SS]   1258 Temp: 97.9 °F (36.6 °C) [SS]   1258 BP: 99/62  HR elevated, vitals otherwise reassuring [SS]   1258 Pulse(!): 122  HR improving with IV metoprolol [SS]   1258 COVID-19 Rapid Screening  Negative  [SS]   1258 Hepatitis C Antibody  Negative  [SS]   1258 HIV 1/2 Ag/Ab (4th Gen)  Negative  [SS]   1258 TSH  WNL [SS]   1258 D dimer, quantitative  WNL [SS]   1258 Protime-INR(!)  INR therapeutic  [SS]   1258 Brain natriuretic peptide(!)  Elevated  [SS]   1258 Troponin I  WNL [SS]   1258 Lactic Acid, Plasma  WNL [SS]   1258 Comprehensive metabolic panel(!)  Unremarkable  [SS]   1259 CBC Without Differential(!)  Unremarkable  [SS]   1259 X-Ray Chest AP Portable  CXR independently interpreted: sternotomy wires, pacer in place, no focal infiltrate, effusion, edema, free air, or other acute process  Agree with radiologist interpretation.    [SS]   1402 CT Chest Without Contrast  CT chest independently interpreted: stable aortic repair, no obvious rupture or dissection.  Agree with radiologist interpretation.    [SS]   1702 HR remains elevated in 120-130s.   Cards consulted, recommend continuing PO metoprolol and trial of diltiazem drip. Will evaluate at bedside for eventual cardioversion.   Due to diltiazem drip, ICU consulted.   [SS]   1705 BP: 119/76  BP remains stable.  [SS]      ED Course User Index  [SS] Christian Price MD            Medical Decision Making  61 yo F with extensive cardiac history presents in a-fib with RVR.  Multiple attempts at rate control with IV metoprolol unsuccessful.  Labs reassuring. CT chest without acute change to thoracic aneurysm repair.   IV diltiazem given and drip started. Cardiology and ICU consulted,  patient will be admitted to ICU for further management.     Problems Addressed:  Aneurysm of ascending aorta without rupture: chronic illness or injury that poses a threat to life or bodily functions  Atrial fibrillation with RVR: complicated acute illness or injury with systemic symptoms that poses a threat to life or bodily functions  Palpitations: complicated acute illness or injury with systemic symptoms    Amount and/or Complexity of Data Reviewed  External Data Reviewed: labs, radiology, ECG and notes.  Labs: ordered. Decision-making details documented in ED Course.  Radiology: ordered and independent interpretation performed. Decision-making details documented in ED Course.  ECG/medicine tests: ordered and independent interpretation performed.    Risk  OTC drugs.  Prescription drug management.  Decision regarding hospitalization.    Critical Care  Total time providing critical care: 60 minutes        Clinical Impression:       ICD-10-CM ICD-9-CM   1. Atrial fibrillation with RVR  I48.91 427.31   2. Palpitations  R00.2 785.1   3. Aneurysm of ascending aorta without rupture  I71.21 441.2   4. History of mechanical aortic valve replacement  Z95.2 V43.3         Follow-up Information    None          ED Disposition Condition    Admit               On re-evaluation, the patient's status has remained serious.  At this time, I believe the patient should be admitted to the hospital for further evaluation and management of a-fib with RVR.  ICU service was contacted and the case was discussed.   The consulting physician/team agrees with plan and will admit under their service.   The patient and family were updated with test results, overall impression, and further plan of care. All questions were answered. The patient expressed understanding and agrees with the current plan.       Christian Price MD  09/10/23 8831

## 2023-09-09 NOTE — ED TRIAGE NOTES
Patient presents to the ED today with reports of Palpitations, weakness, nausea and hypotension x1 week. Patient reports history of afib. Extensive cardiac history and reports taking coumadin

## 2023-09-09 NOTE — ASSESSMENT & PLAN NOTE
Patient with extensive cardiac history presented to the ED c/o one-week history of intermittent palpitations associated with SOB, nausea, fatigue, and generalized weakness. In the ED, EKG with a-fib with RVR. She was given metoprolol 5 mg IV x 3 and diltiazem 10 mg IV x 1 with no change in her heart rhythm. She is being admitted to the ICU for titrating diltiazem drip.     Current rhythm: AF with RVR  Home meds: No previous history of AF RVR per chart review (documented history of metoprolol and sotalol, was supposed to follow up with cardiologist OP for regimen clarification)    -- Rate control: diltiazem gtt with goal HR < 110; titrate off and substitute with home beta-blocker (titrate to goal rate)  -- Patient anticoagulated with warfarin for mechanical aortic valve  -- Cardiac telemetry  -- Maintain K > 4, Mg > 2, Ca wnl; replete PRN  -- STAT cardiology consult if hemodynamic instability for DCCV evaluation

## 2023-09-10 LAB
ALBUMIN SERPL BCP-MCNC: 3.5 G/DL (ref 3.5–5.2)
ALP SERPL-CCNC: 93 U/L (ref 55–135)
ALT SERPL W/O P-5'-P-CCNC: 24 U/L (ref 10–44)
AMPHET+METHAMPHET UR QL: NEGATIVE
ANION GAP SERPL CALC-SCNC: 12 MMOL/L (ref 8–16)
AST SERPL-CCNC: 32 U/L (ref 10–40)
BARBITURATES UR QL SCN>200 NG/ML: NEGATIVE
BASOPHILS # BLD AUTO: 0.05 K/UL (ref 0–0.2)
BASOPHILS # BLD AUTO: 0.06 K/UL (ref 0–0.2)
BASOPHILS # BLD AUTO: 0.06 K/UL (ref 0–0.2)
BASOPHILS NFR BLD: 0.5 % (ref 0–1.9)
BASOPHILS NFR BLD: 0.6 % (ref 0–1.9)
BASOPHILS NFR BLD: 0.6 % (ref 0–1.9)
BENZODIAZ UR QL SCN>200 NG/ML: NEGATIVE
BILIRUB SERPL-MCNC: 1.6 MG/DL (ref 0.1–1)
BUN SERPL-MCNC: 20 MG/DL (ref 8–23)
BZE UR QL SCN: NEGATIVE
CA-I BLDV-SCNC: 1.17 MMOL/L (ref 1.06–1.42)
CALCIUM SERPL-MCNC: 8.5 MG/DL (ref 8.7–10.5)
CANNABINOIDS UR QL SCN: NEGATIVE
CHLORIDE SERPL-SCNC: 106 MMOL/L (ref 95–110)
CO2 SERPL-SCNC: 19 MMOL/L (ref 23–29)
CREAT SERPL-MCNC: 1 MG/DL (ref 0.5–1.4)
CREAT UR-MCNC: 10 MG/DL (ref 15–325)
DIFFERENTIAL METHOD: ABNORMAL
EOSINOPHIL # BLD AUTO: 0 K/UL (ref 0–0.5)
EOSINOPHIL # BLD AUTO: 0.1 K/UL (ref 0–0.5)
EOSINOPHIL # BLD AUTO: 0.1 K/UL (ref 0–0.5)
EOSINOPHIL NFR BLD: 0.4 % (ref 0–8)
EOSINOPHIL NFR BLD: 0.5 % (ref 0–8)
EOSINOPHIL NFR BLD: 1.4 % (ref 0–8)
ERYTHROCYTE [DISTWIDTH] IN BLOOD BY AUTOMATED COUNT: 14.2 % (ref 11.5–14.5)
ERYTHROCYTE [DISTWIDTH] IN BLOOD BY AUTOMATED COUNT: 14.4 % (ref 11.5–14.5)
ERYTHROCYTE [DISTWIDTH] IN BLOOD BY AUTOMATED COUNT: 14.5 % (ref 11.5–14.5)
EST. GFR  (NO RACE VARIABLE): >60 ML/MIN/1.73 M^2
ETHANOL UR-MCNC: <10 MG/DL
GLUCOSE SERPL-MCNC: 227 MG/DL (ref 70–110)
HCT VFR BLD AUTO: 33.7 % (ref 37–48.5)
HCT VFR BLD AUTO: 36.7 % (ref 37–48.5)
HCT VFR BLD AUTO: 37.5 % (ref 37–48.5)
HGB BLD-MCNC: 11.1 G/DL (ref 12–16)
HGB BLD-MCNC: 11.8 G/DL (ref 12–16)
HGB BLD-MCNC: 12.1 G/DL (ref 12–16)
IMM GRANULOCYTES # BLD AUTO: 0.02 K/UL (ref 0–0.04)
IMM GRANULOCYTES # BLD AUTO: 0.03 K/UL (ref 0–0.04)
IMM GRANULOCYTES # BLD AUTO: 0.03 K/UL (ref 0–0.04)
IMM GRANULOCYTES NFR BLD AUTO: 0.2 % (ref 0–0.5)
IMM GRANULOCYTES NFR BLD AUTO: 0.3 % (ref 0–0.5)
IMM GRANULOCYTES NFR BLD AUTO: 0.3 % (ref 0–0.5)
INR PPP: 2.8 (ref 0.8–1.2)
LYMPHOCYTES # BLD AUTO: 0.9 K/UL (ref 1–4.8)
LYMPHOCYTES # BLD AUTO: 1.1 K/UL (ref 1–4.8)
LYMPHOCYTES # BLD AUTO: 1.5 K/UL (ref 1–4.8)
LYMPHOCYTES NFR BLD: 11.5 % (ref 18–48)
LYMPHOCYTES NFR BLD: 16.5 % (ref 18–48)
LYMPHOCYTES NFR BLD: 9.5 % (ref 18–48)
MAGNESIUM SERPL-MCNC: 1.8 MG/DL (ref 1.6–2.6)
MCH RBC QN AUTO: 32.7 PG (ref 27–31)
MCH RBC QN AUTO: 32.8 PG (ref 27–31)
MCH RBC QN AUTO: 33.1 PG (ref 27–31)
MCHC RBC AUTO-ENTMCNC: 32.2 G/DL (ref 32–36)
MCHC RBC AUTO-ENTMCNC: 32.3 G/DL (ref 32–36)
MCHC RBC AUTO-ENTMCNC: 32.9 G/DL (ref 32–36)
MCV RBC AUTO: 102 FL (ref 82–98)
MCV RBC AUTO: 103 FL (ref 82–98)
MCV RBC AUTO: 99 FL (ref 82–98)
METHADONE UR QL SCN>300 NG/ML: NEGATIVE
MONOCYTES # BLD AUTO: 0.6 K/UL (ref 0.3–1)
MONOCYTES # BLD AUTO: 0.6 K/UL (ref 0.3–1)
MONOCYTES # BLD AUTO: 0.7 K/UL (ref 0.3–1)
MONOCYTES NFR BLD: 5.9 % (ref 4–15)
MONOCYTES NFR BLD: 6 % (ref 4–15)
MONOCYTES NFR BLD: 7.8 % (ref 4–15)
NEUTROPHILS # BLD AUTO: 7 K/UL (ref 1.8–7.7)
NEUTROPHILS # BLD AUTO: 7.4 K/UL (ref 1.8–7.7)
NEUTROPHILS # BLD AUTO: 8 K/UL (ref 1.8–7.7)
NEUTROPHILS NFR BLD: 75.4 % (ref 38–73)
NEUTROPHILS NFR BLD: 79.4 % (ref 38–73)
NEUTROPHILS NFR BLD: 83.2 % (ref 38–73)
NRBC BLD-RTO: 0 /100 WBC
OPIATES UR QL SCN: NEGATIVE
PCP UR QL SCN>25 NG/ML: NEGATIVE
PHOSPHATE SERPL-MCNC: 3.1 MG/DL (ref 2.7–4.5)
PLATELET # BLD AUTO: 206 K/UL (ref 150–450)
PLATELET # BLD AUTO: 213 K/UL (ref 150–450)
PLATELET # BLD AUTO: 226 K/UL (ref 150–450)
PMV BLD AUTO: 10.2 FL (ref 9.2–12.9)
PMV BLD AUTO: 11.1 FL (ref 9.2–12.9)
PMV BLD AUTO: 9.9 FL (ref 9.2–12.9)
POTASSIUM SERPL-SCNC: 4 MMOL/L (ref 3.5–5.1)
PROT SERPL-MCNC: 6.1 G/DL (ref 6–8.4)
PROTHROMBIN TIME: 28.6 SEC (ref 9–12.5)
RBC # BLD AUTO: 3.39 M/UL (ref 4–5.4)
RBC # BLD AUTO: 3.57 M/UL (ref 4–5.4)
RBC # BLD AUTO: 3.69 M/UL (ref 4–5.4)
SODIUM SERPL-SCNC: 137 MMOL/L (ref 136–145)
TOXICOLOGY INFORMATION: ABNORMAL
WBC # BLD AUTO: 9.27 K/UL (ref 3.9–12.7)
WBC # BLD AUTO: 9.31 K/UL (ref 3.9–12.7)
WBC # BLD AUTO: 9.6 K/UL (ref 3.9–12.7)

## 2023-09-10 PROCEDURE — 85025 COMPLETE CBC W/AUTO DIFF WBC: CPT | Performed by: STUDENT IN AN ORGANIZED HEALTH CARE EDUCATION/TRAINING PROGRAM

## 2023-09-10 PROCEDURE — 82330 ASSAY OF CALCIUM: CPT

## 2023-09-10 PROCEDURE — 99222 PR INITIAL HOSPITAL CARE,LEVL II: ICD-10-PCS | Mod: GC,,, | Performed by: INTERNAL MEDICINE

## 2023-09-10 PROCEDURE — 99222 1ST HOSP IP/OBS MODERATE 55: CPT | Mod: GC,,, | Performed by: INTERNAL MEDICINE

## 2023-09-10 PROCEDURE — 25000003 PHARM REV CODE 250

## 2023-09-10 PROCEDURE — 99291 PR CRITICAL CARE, E/M 30-74 MINUTES: ICD-10-PCS | Mod: GC,,, | Performed by: STUDENT IN AN ORGANIZED HEALTH CARE EDUCATION/TRAINING PROGRAM

## 2023-09-10 PROCEDURE — 20600001 HC STEP DOWN PRIVATE ROOM

## 2023-09-10 PROCEDURE — 85025 COMPLETE CBC W/AUTO DIFF WBC: CPT | Mod: 91

## 2023-09-10 PROCEDURE — 94761 N-INVAS EAR/PLS OXIMETRY MLT: CPT

## 2023-09-10 PROCEDURE — 80307 DRUG TEST PRSMV CHEM ANLYZR: CPT

## 2023-09-10 PROCEDURE — 85610 PROTHROMBIN TIME: CPT

## 2023-09-10 PROCEDURE — 84100 ASSAY OF PHOSPHORUS: CPT

## 2023-09-10 PROCEDURE — 83735 ASSAY OF MAGNESIUM: CPT

## 2023-09-10 PROCEDURE — 63600175 PHARM REV CODE 636 W HCPCS: Mod: UD

## 2023-09-10 PROCEDURE — 63600175 PHARM REV CODE 636 W HCPCS

## 2023-09-10 PROCEDURE — 99291 CRITICAL CARE FIRST HOUR: CPT | Mod: GC,,, | Performed by: STUDENT IN AN ORGANIZED HEALTH CARE EDUCATION/TRAINING PROGRAM

## 2023-09-10 PROCEDURE — C9113 INJ PANTOPRAZOLE SODIUM, VIA: HCPCS

## 2023-09-10 PROCEDURE — 80053 COMPREHEN METABOLIC PANEL: CPT

## 2023-09-10 RX ORDER — METOPROLOL SUCCINATE 50 MG/1
100 TABLET, EXTENDED RELEASE ORAL DAILY
Status: DISCONTINUED | OUTPATIENT
Start: 2023-09-10 | End: 2023-09-11

## 2023-09-10 RX ORDER — MAGNESIUM SULFATE HEPTAHYDRATE 40 MG/ML
2 INJECTION, SOLUTION INTRAVENOUS ONCE
Status: COMPLETED | OUTPATIENT
Start: 2023-09-10 | End: 2023-09-10

## 2023-09-10 RX ORDER — FUROSEMIDE 10 MG/ML
40 INJECTION INTRAMUSCULAR; INTRAVENOUS
Status: DISCONTINUED | OUTPATIENT
Start: 2023-09-10 | End: 2023-09-11

## 2023-09-10 RX ORDER — HEPARIN SODIUM,PORCINE/D5W 25000/250
0-40 INTRAVENOUS SOLUTION INTRAVENOUS CONTINUOUS
Status: DISCONTINUED | OUTPATIENT
Start: 2023-09-10 | End: 2023-09-10

## 2023-09-10 RX ADMIN — FUROSEMIDE 40 MG: 10 INJECTION, SOLUTION INTRAVENOUS at 09:09

## 2023-09-10 RX ADMIN — AMIODARONE HYDROCHLORIDE 150 MG: 1.5 INJECTION, SOLUTION INTRAVENOUS at 02:09

## 2023-09-10 RX ADMIN — MAGNESIUM SULFATE HEPTAHYDRATE 2 G: 40 INJECTION, SOLUTION INTRAVENOUS at 09:09

## 2023-09-10 RX ADMIN — AMIODARONE HYDROCHLORIDE 0.5 MG/MIN: 1.8 INJECTION, SOLUTION INTRAVENOUS at 08:09

## 2023-09-10 RX ADMIN — CEFADROXIL 1 G: 500 CAPSULE ORAL at 09:09

## 2023-09-10 RX ADMIN — METOPROLOL TARTRATE 50 MG: 50 TABLET, FILM COATED ORAL at 09:09

## 2023-09-10 RX ADMIN — CEFADROXIL 1 G: 500 CAPSULE ORAL at 10:09

## 2023-09-10 RX ADMIN — AMIODARONE HYDROCHLORIDE 1 MG/MIN: 1.8 INJECTION, SOLUTION INTRAVENOUS at 02:09

## 2023-09-10 RX ADMIN — METOPROLOL SUCCINATE 100 MG: 50 TABLET, EXTENDED RELEASE ORAL at 12:09

## 2023-09-10 RX ADMIN — PANTOPRAZOLE SODIUM 40 MG: 40 INJECTION, POWDER, FOR SOLUTION INTRAVENOUS at 09:09

## 2023-09-10 RX ADMIN — PROCHLORPERAZINE EDISYLATE 5 MG: 5 INJECTION INTRAMUSCULAR; INTRAVENOUS at 12:09

## 2023-09-10 RX ADMIN — AMIODARONE HYDROCHLORIDE 1 MG/MIN: 1.8 INJECTION, SOLUTION INTRAVENOUS at 08:09

## 2023-09-10 NOTE — SUBJECTIVE & OBJECTIVE
Past Medical History:   Diagnosis Date    *Atrial fibrillation     Heart disease     Hypertension     Pacemaker 2013    S/P Mechanical AVR 2013    S/P thoracic aortic aneurysm repair with Dacron graft () 2013    S/P VSD surgical patch repair -2013       Past Surgical History:   Procedure Laterality Date    ASCENDING AORTIC ANEURYSM REPAIR      BREAST SURGERY      CARDIAC CATHETERIZATION      CARDIAC PACEMAKER PLACEMENT      HYSTERECTOMY      MECHANICAL AORTIC VALVE REPLACEMENT         Review of patient's allergies indicates:   Allergen Reactions    Iodine Anaphylaxis     contrast    Lidocaine Anaphylaxis     cetacaine spray     Family History       Problem Relation (Age of Onset)    Arrhythmia Mother    Heart disease Mother, Father    Heart failure Mother    Hypertension Mother, Father          Tobacco Use    Smoking status: Former     Current packs/day: 0.00     Types: Cigarettes     Quit date: 2009     Years since quittin.8    Smokeless tobacco: Never   Substance and Sexual Activity    Alcohol use: No    Drug use: No    Sexual activity: Not on file     Review of Systems   Constitutional:  Negative for fever.   Gastrointestinal:  Positive for blood in stool. Negative for abdominal pain, nausea and vomiting.     Objective:     Vital Signs (Most Recent):  Temp: 98.1 °F (36.7 °C) (09/10/23 0000)  Pulse: 103 (09/10/23 0931)  Resp: (!) 27 (09/10/23 0749)  BP: 116/62 (09/10/23 0931)  SpO2: (!) 94 % (09/10/23 0749) Vital Signs (24h Range):  Temp:  [97.9 °F (36.6 °C)-98.3 °F (36.8 °C)] 98.1 °F (36.7 °C)  Pulse:  [] 103  Resp:  [13-39] 27  SpO2:  [89 %-100 %] 94 %  BP: ()/(50-92) 116/62     Weight: 68 kg (150 lb) (23 1034)  Body mass index is 26.57 kg/m².      Intake/Output Summary (Last 24 hours) at 9/10/2023 1001  Last data filed at 9/10/2023 0645  Gross per 24 hour   Intake 303.93 ml   Output 100 ml   Net 203.93 ml       Lines/Drains/Airways       Peripherally  Inserted Central Catheter Line  Duration             PICC Double Lumen 05/23/22 1440 right basilic 474 days              Peripheral Intravenous Line  Duration                  Peripheral IV - Single Lumen 09/09/23 1149 20 G Posterior;Right Hand <1 day         Peripheral IV - Single Lumen 09/09/23 1913 20 G Distal;Left;Posterior Forearm <1 day         Peripheral IV - Single Lumen 09/09/23 2300 18 G Left Antecubital <1 day                     Physical Exam  Vitals reviewed.   Constitutional:       General: She is not in acute distress.  HENT:      Head: Normocephalic.   Eyes:      Extraocular Movements: Extraocular movements intact.   Cardiovascular:      Rate and Rhythm: Tachycardia present.   Pulmonary:      Effort: Pulmonary effort is normal. No respiratory distress.   Abdominal:      General: There is no distension.   Neurological:      Mental Status: She is alert. Mental status is at baseline.          Significant Labs:  All pertinent lab results from the last 24 hours have been reviewed.

## 2023-09-10 NOTE — CONSULTS
Consult received, patient to be admitted to Critical Care Medicine. Full H&P to follow.    Varsha Mcgowan MD  Pulmonary/Critical Care Fellow  09/09/2023 7:55 PM  Spectra: [id number]

## 2023-09-10 NOTE — ASSESSMENT & PLAN NOTE
Blood Pressure: 120s-130s SBP  Home Medications: valsartan 160 bid, amlodipine 5, metoprolol 100 qd    -- Restart PO home medications as tolerated; holding s/o normotension and GI bleed

## 2023-09-10 NOTE — PLAN OF CARE
MICU DAILY GOALS     Family/Goals of care/Code Status   Code Status: Full Code    24H Vital Sign Range  Temp:  [97.9 °F (36.6 °C)-98.3 °F (36.8 °C)]   Pulse:  []   Resp:  [13-39]   BP: ()/(50-92)   SpO2:  [89 %-100 %]      Shift Events  Pt admitted to MICU for further management of Afib with RVR. Switched from Diltiazem gtt to Amiodarone gtt.   Pt also c/o abdominal pain with associated N/V; reports two to three dark bowel movements in ED - CC team provider notified and came to bedside. Stat CBC drawn - H/H stable. Medication given for pain and nausea management. GI consulted. Abdominal ultrasound pending. Symptoms improved throughout shift.    AWAKE RASS: Goal - RASS Goal: 0-->alert and calm  Actual - RASS (Meyers Agitation-Sedation Scale): alert and calm    Restraint necessity: Not necessary   BREATHE SBT: Not intubated    Coordinate A & B, analgesics/sedatives Pain: managed   SAT: Not intubated   Delirium CAM-ICU: Overall CAM-ICU: Negative   Early(intubated/ Progressive (non-intubated) Mobility MOVE Screen: Pass   Activity: Activity Management: Rolling - L1   Feeding/Nutrition Diet order: Diet/Nutrition Received: NPO,     Thrombus DVT prophylaxis:     HOB Elevation Head of Bed (HOB) Positioning: HOB at 30-45 degrees   Ulcer Prophylaxis GI: yes   Glucose control managed     Skin Skin assessed during daily assessment.    Bowel Function no issues    Indwelling Catheter Necessity            De-escalation Antibiotics N/a       VS and assessment per flow sheet, patient progressing towards goals as tolerated, plan of care reviewed with patient and pt friend, all concerns addressed at this time.    Brenton Hanson RN

## 2023-09-10 NOTE — ASSESSMENT & PLAN NOTE
Patient with paroxysmal atrial fibrillation that was triggered by decompensated HFpEF. Patient on anticoagulation with coumadin for mechanical  AVR.  -Continue coumadin  -Can complete 24 hor amio load, no need for PO after  -Can continue metoprolol succinate 100mg daily   - Monitor electrolytes closely. Maintain Mg >2 and K >4

## 2023-09-10 NOTE — H&P
"[facility] - Emergency Dept  Critical Care Medicine  History & Physical    Patient Name: Naina Keyes  MRN: [id number]  Admission Date: 9/9/2023  Hospital Length of Stay: 0 days  Code Status: Full Code  Attending Physician: Bob Morrison MD   Primary Care Provider: Anjana Torres NP   Principal Problem: Atrial fibrillation with RVR    Subjective:     HPI:  Naina Keyes is a 61 yo F with an extensive cardiac history including mechanical AVR 2/2 bacterial endocarditis, TAA repair with Dacron graft, VSD surgical patch repair, s/p pacemaker, pAF, and HTN who is being admitted to MICU for further management of a-fib with RVR requiring titrating diltiazem drip. She initially presented to the ED complaining of intermittent palpitations x 1 week. During the episodes of palpitations, she also experienced concomitant SOB, nausea, fatigue, and generalized weakness. This morning, her symptoms were sustained and more severe, which prompted her to come to the ED. During our interview, she additionally mentioned that she has been experiencing epigastric abdominal pain described as "feeling my pulse in my stomach." She is on chronic anticoagulation and chronic antibiotics, denies missing any doses.     Upon arrival to the ED, BP 99/62, , RR 20, & SpO2 100% on RA. On exam, she had an irregularly irregular heart beat and was tachycardic. Significant labs: Trop negative, BNP 1157, D-dimer 0.45, lactate 1.3, T bili 1.8, CBC unremarkable. EKG with a-fib with RVR; no STEMI. CXR with no acute process. CT chest with bilateral small pleural effusions & findings possibly due to increased pulmonary venous pressure. She was given 1 L NS bolus, metoprolol 5 mg IV x 3 (11:50, 14:02, 14:43), diltiazem 10 mg IV x 1, and Toprol 100 mg x 1.       Hospital/ICU Course:  No notes on file     Past Medical History:   Diagnosis Date    Heart disease     Hypertension     Pacemaker 11/25/2013    S/P Mechanical AVR 11/25/2013    S/P thoracic aortic " aneurysm repair with Dacron graft () 2013    S/P VSD surgical patch repair -2013       Past Surgical History:   Procedure Laterality Date    ASCENDING AORTIC ANEURYSM REPAIR      BREAST SURGERY      CARDIAC CATHETERIZATION      CARDIAC PACEMAKER PLACEMENT      HYSTERECTOMY      MECHANICAL AORTIC VALVE REPLACEMENT         Review of patient's allergies indicates:   Allergen Reactions    Iodine Anaphylaxis     contrast    Lidocaine Anaphylaxis     cetacaine spray       Family History       Problem Relation (Age of Onset)    Arrhythmia Mother    Heart disease Mother, Father    Heart failure Mother    Hypertension Mother, Father          Tobacco Use    Smoking status: Former     Current packs/day: 0.00     Types: Cigarettes     Quit date: 2009     Years since quittin.8    Smokeless tobacco: Never   Substance and Sexual Activity    Alcohol use: No    Drug use: No    Sexual activity: Not on file      Review of Systems   Constitutional:  Positive for fatigue. Negative for chills and fever.   Respiratory:  Positive for shortness of breath.    Cardiovascular:  Positive for palpitations. Negative for chest pain.   Gastrointestinal:  Positive for abdominal pain, nausea and vomiting.   Musculoskeletal:  Positive for back pain.   Neurological:  Positive for weakness. Negative for light-headedness and headaches.     Objective:     Vital Signs (Most Recent):  Temp: 97.9 °F (36.6 °C) (23 1034)  Pulse: (!) 139 (23)  Resp: 20 (23)  BP: 122/72 (23)  SpO2: 99 % (23) Vital Signs (24h Range):  Temp:  [97.9 °F (36.6 °C)] 97.9 °F (36.6 °C)  Pulse:  [109-145] 139  Resp:  [13-20] 20  SpO2:  [95 %-100 %] 99 %  BP: ()/(60-83) 122/72   Weight: 68 kg (150 lb)  Body mass index is 26.57 kg/m².    No intake or output data in the 24 hours ending 23       Physical Exam  Vitals and nursing note reviewed.   Constitutional:       General: She is not in  acute distress.     Appearance: Normal appearance.   HENT:      Head: Normocephalic and atraumatic.   Cardiovascular:      Rate and Rhythm: Tachycardia present. Rhythm irregular.   Pulmonary:      Effort: Pulmonary effort is normal.      Breath sounds: Normal breath sounds.   Abdominal:      General: Abdomen is flat. There is no distension.      Palpations: Abdomen is soft.      Comments: No epigastric bruit noted.    Neurological:      General: No focal deficit present.      Mental Status: She is alert and oriented to person, place, and time.            Vents:     Lines/Drains/Airways       Peripherally Inserted Central Catheter Line  Duration             PICC Double Lumen 05/23/22 1440 right basilic 474 days              Peripheral Intravenous Line  Duration                  Peripheral IV - Single Lumen 09/09/23 1149 20 G Posterior;Right Hand <1 day                  Significant Labs:    CBC/Anemia Profile:  Recent Labs   Lab 09/09/23  1144   WBC 8.39   HGB 12.4   HCT 35.0*      *   RDW 14.2        Chemistries:  Recent Labs   Lab 09/09/23  1144      K 4.8      CO2 20*   BUN 20   CREATININE 1.0   CALCIUM 9.2   ALBUMIN 4.0   PROT 7.0   BILITOT 1.8*   ALKPHOS 108   ALT 25   AST 32       All pertinent labs within the past 24 hours have been reviewed.    Significant Imaging: I have reviewed all pertinent imaging results/findings within the past 24 hours.    Assessment/Plan:     Cardiac/Vascular  * Atrial fibrillation with RVR  Patient with extensive cardiac history presented to the ED c/o one-week history of intermittent palpitations associated with SOB, nausea, fatigue, and generalized weakness. In the ED, EKG with a-fib with RVR. She was given metoprolol 5 mg IV x 3 and diltiazem 10 mg IV x 1 with no change in her heart rhythm. She is being admitted to the ICU for titrating diltiazem drip.     Decompensation: Etiology may be 2/2 hypervolemia s/o ADHF  Current rhythm: AF with RVR  Home meds:  States previous history of AF RVR (documented history of metoprolol and sotalol, was supposed to follow up with cardiologist OP for regimen clarification)    -- Rate control: diltiazem gtt with goal HR < 110; titrate off and substitute with home beta-blocker (titrate to goal rate)  -- Patient anticoagulated with warfarin for mechanical aortic valve  -- Cardiac telemetry  -- Maintain K > 4, Mg > 2, Ca wnl; replete PRN  -- STAT cardiology consult if hemodynamic instability for DCCV evaluation  -- TTE for evaluation of elevated BNP; possibly tachyarrhythmia heart failure; however patient appears euvolemic on examination without oxygen requirement    S/P Mechanical AVR secondary to bacterial endocarditis -1997, [facility] [employer] valve  S/P VSD surgical patch repair 1997  S/P thoracic aortic aneurysm repair with Dacron graft 2007    Follows with Dr Colton Gallegos in Interventional Cardiology. Anticoagulated with warfarin.    -- Continue warfarin while inpatient  -- Appreciate PharmD assistance in dosing adjustment while inpatient  -- Daily INR; goal INR 2.5-3.5    Acute decompensated heart failure  Patient with recent onset of orthopnea, increased cough with laying back. Patient is not on a home diuretic regimen, nor does she believe she has been diagnosed with heart failure. She has an extensive cardiac history, however, and states she may have coded on a procedure table 30+ years ago when her initial AVR was placed.    TTE (05/2022) EF 60%  Home meds: Valsartan, Toprol  Dry weight: Unknown  Diagnostics: CXR coarse interstitial opacities relatively stable, no pleural effusion; BNP 1000+; Lactate 1.3; O2 requirement: None    -- Aggressive diuresis: Lasix 40 qd; titrate to clinical response and UOP goal  -- Strict I/Os; goal net negative 1.5 - 2 L / day  -- Daily weights (standing if tolerated)  -- Fluid restriction at 1500mL  -- Cardiac diet w/ 2 g Na restriction  -- GDMT held s/o active GI bleed and normal pressures; will restart  when clinically appropriate  -- Repeat TTE pending    Essential Hypertension  Home Medications: valsartan 160 bid, amlodipine 5, metoprolol 100 qd    -- Restart PO home medications as tolerated; holding s/o normotension and GI bleed    Gastrointestinal hemorrhage with melena  Symptoms: 3x witnessed melenic stools while inpatient, epigastric pain  Diagnostics: Hgb 12.5 (baseline 11-12s), MCV 100s, , Coag INR 2.8  APT / AC: Warfarin for aortic mechanical valve    -- GI consult for EGD / C-Scope consult for source evaluation +/- intervention  -- IV Protonix 80mg x 1, followed by IV 40mg BID thereafter  -- NPO  -- Trend CBCs ; Transfuse Hgb < 7  -- Resuscitate IVF PRN  -- Cardiac telemetry  -- Maintain 2 large bore IVs  -- If hemodynamically unstable +/- new bleed, stat CTA / consult IR for embolization evaluation  -- Will maintain INR at therapeutic goal d/t risk of aortic MVR thrombus / complication unless hemodynamically unstable, discussed with cardiology        Critical Care Daily Checklist:    A: Awake: RASS Goal/Actual Goal:    Actual:     B: Spontaneous Breathing Trial Performed?     C: SAT & SBT Coordinated?  N/A                      D: Delirium: CAM-ICU     E: Early Mobility Performed? Yes   F: Feeding Goal:    Status:     Current Diet Order   No orders of the defined types were placed in this encounter.      AS: Analgesia/Sedation N/A   T: Thromboembolic Prophylaxis WARFARIN   H: HOB > 300 Yes   U: Stress Ulcer Prophylaxis (if needed) IV PPI   G: Glucose Control N/A   B: Bowel Function     I: Indwelling Catheter (Lines & Don) Necessity PIV   D: De-escalation of Antimicrobials/Pharmacotherapies N/A    Plan for the day/ETD WEAN DILT GTT    Code Status:  Family/Goals of Care: Full Code         Critical secondary to Patient has a condition that poses threat to life and bodily function: TITRATING DILTIAZEM GTT, GI BLEED     Critical care was time spent personally by me on the following activities:  development of treatment plan with patient or surrogate and bedside caregivers, discussions with consultants, evaluation of patient's response to treatment, examination of patient, ordering and performing treatments and interventions, ordering and review of laboratory studies, ordering and review of radiographic studies, pulse oximetry, re-evaluation of patient's condition. This critical care time did not overlap with that of any other provider or involve time for any procedures.     Francis Gonzalez MD  Critical Care Medicine  [facility] - Emergency Dept

## 2023-09-10 NOTE — ASSESSMENT & PLAN NOTE
S/P VSD surgical patch repair 1997  S/P thoracic aortic aneurysm repair with Dacron graft 2007    Follows with Dr Colton Gallegos in Interventional Cardiology. Anticoagulated with warfarin.    -- Continue warfarin while inpatient  -- Appreciate PharmD assistance in dosing adjustment while inpatient  -- Daily INR

## 2023-09-10 NOTE — ASSESSMENT & PLAN NOTE
Patient with recent onset of orthopnea, increased cough with laying back. Patient is not on a home diuretic regimen, nor does she believe she has been diagnosed with heart failure. She has an extensive cardiac history, however, and states she may have coded on a procedure table 30+ years ago when her initial AVR was placed.    TTE (05/2022) EF 60%  Home meds: Valsartan, Toprol  Dry weight: Unknown  Diagnostics: CXR coarse interstitial opacities relatively stable, no pleural effusion; BNP 1000+; Lactate 1.3; O2 requirement: None    -- Aggressive diuresis: Lasix 40 qd; titrate to clinical response and UOP goal  -- Strict I/Os; goal net negative 1.5 - 2 L / day  -- Daily weights (standing if tolerated)  -- Fluid restriction at 1500mL  -- Cardiac diet w/ 2 g Na restriction  -- GDMT held s/o active GI bleed and normal pressures; will restart when clinically appropriate  -- Repeat TTE pending

## 2023-09-10 NOTE — ASSESSMENT & PLAN NOTE
Patient with extensive cardiac history presented to the ED c/o one-week history of intermittent palpitations associated with SOB, nausea, fatigue, and generalized weakness. In the ED, EKG with a-fib with RVR. She was given metoprolol 5 mg IV x 3 and diltiazem 10 mg IV x 1 with no change in her heart rhythm. She is being admitted to the ICU for titrating diltiazem drip.     Decompensation: Etiology may be 2/2 hypervolemia s/o ADHF  Current rhythm: AF with RVR  Home meds: States previous history of AF RVR (documented history of metoprolol and sotalol, was supposed to follow up with cardiologist OP for regimen clarification)    -- Rate control: diltiazem gtt with goal HR < 110; titrate off and substitute with home beta-blocker (titrate to goal rate)   - transitioned to amiodarone infusion. Now in NSR. Discussed with Cardiology, appreciate recs    - complete infusion and maintain PO metoprolol, no PO amiodarone following infusion  -- Patient anticoagulated with warfarin for mechanical aortic valve   - holding for endoscopy  -- Cardiac telemetry  -- Maintain K > 4, Mg > 2, Ca wnl; replete PRN  -- STAT cardiology consult if hemodynamic instability for DCCV evaluation

## 2023-09-10 NOTE — PLAN OF CARE
MICU DAILY GOALS     Family/Goals of care/Code Status   Code Status: Full Code    24H Vital Sign Range  Temp:  [97.8 °F (36.6 °C)-98.3 °F (36.8 °C)]   Pulse:  []   Resp:  [15-41]   BP: ()/(50-92)   SpO2:  [89 %-99 %]      Shift Events   No acute events throughout shift. Pt up to BR to void and is steady on her feet. She tolerated her diet well and stated that the c/o abd pain, nausea, ect; have resolved. VSS, afebrile. No c/o pain, nausea or SOB. Plans to be NPO for EGD and colonoscopy tomorrow and if everything is WNL, she will be Dc'd home. See additional MD notes.    AWAKE RASS: Goal - RASS Goal: 0-->alert and calm  Actual - RASS (Meyers Agitation-Sedation Scale): alert and calm    Restraint necessity: Not necessary   BREATHE SBT: Not intubated    Coordinate A & B, analgesics/sedatives Pain: managed   SAT: Not intubated   Delirium CAM-ICU: Overall CAM-ICU: Negative   Early(intubated/ Progressive (non-intubated) Mobility MOVE Screen: Pass   Activity: Activity Management: Ambulated to bathroom - L4   Feeding/Nutrition Diet order: Diet/Nutrition Received: NPO,     Thrombus DVT prophylaxis: VTE Required Core Measure: Pharmacological prophylaxis initiated/maintained   HOB Elevation Head of Bed (HOB) Positioning: HOB at 30 degrees   Ulcer Prophylaxis GI: no   Glucose control managed     Skin Skin assessed during daily assessment. NO breakdown noted.   Bowel Function no issues    Indwelling Catheter Necessity         No   De-escalation Antibiotics No       VS and assessment per flow sheet, patient progressing towards goals as tolerated, plan of care reviewed with patient and her longtime friend at bedside, all concerns addressed, will continue to monitor.   Problem: Adult Inpatient Plan of Care  Goal: Plan of Care Review  Outcome: Ongoing, Progressing  Goal: Patient-Specific Goal (Individualized)  Outcome: Ongoing, Progressing  Goal: Absence of Hospital-Acquired Illness or Injury  Outcome: Ongoing,  Progressing  Goal: Optimal Comfort and Wellbeing  Outcome: Ongoing, Progressing  Goal: Readiness for Transition of Care  Outcome: Ongoing, Progressing     Problem: Infection  Goal: Absence of Infection Signs and Symptoms  Outcome: Ongoing, Progressing     Problem: Fall Injury Risk  Goal: Absence of Fall and Fall-Related Injury  Outcome: Ongoing, Progressing     Problem: Adjustment to Device (Cardiac Rhythm Management Device)  Goal: Optimal Adjustment to Device  Outcome: Ongoing, Progressing     Problem: Bleeding (Cardiac Rhythm Management Device)  Goal: Absence of Bleeding  Outcome: Ongoing, Progressing     Problem: Device-Related Complication Risk (Cardiac Rhythm Management Device)  Goal: Effective Device Function  Outcome: Ongoing, Progressing     Problem: Infection (Cardiac Rhythm Management Device)  Goal: Absence of Infection Signs and Symptoms  Outcome: Ongoing, Progressing     Problem: Pain (Cardiac Rhythm Management Device)  Goal: Acceptable Pain Level  Outcome: Ongoing, Progressing     Problem: Respiratory Compromise (Cardiac Rhythm Management Device)  Goal: Effective Oxygenation and Ventilation  Outcome: Ongoing, Progressing     Problem: Dysrhythmia  Goal: Normalized Cardiac Rhythm  Outcome: Ongoing, Progressing     Problem: Cardiac Impairment  Goal: Optimal Activity Tolerance  Outcome: Ongoing, Progressing

## 2023-09-10 NOTE — ASSESSMENT & PLAN NOTE
Patient with BNP 1157 and elevated JVD. Last EF on file 60%. Patient with increased salt intake as trigger   -Obtain TTE  -Lasix 40mg  IV bid   -Sodium restriction <2 g, Fluid restriction < 1500 mL, Strict I/Os, Daily weights

## 2023-09-10 NOTE — CONSULTS
"[facility] - Cardiac Medical ICU  Cardiology  Consult Note    Patient Name: Naina Keyes  MRN: [id number]  Admission Date: 9/9/2023  Hospital Length of Stay: 1 days  Code Status: Full Code   Attending Provider: Bob Morrison MD   Consulting Provider: Shereen Hodge MD  Primary Care Physician: Anjana Torres NP  Principal Problem:Atrial fibrillation with RVR    Patient information was obtained from patient and primary team.     Inpatient consult to Cardiology  Consult performed by: Shereen Frey MD  Consult ordered by: Christian Price MD        Subjective:     Chief Complaint:  Afib     HPI:   Naina Keyes is a 63 yo F with an extensive cardiac history including mechanical AVR 2/2 bacterial endocarditis, TAA repair with Dacron graft, VSD surgical patch repair, s/p pacemaker, pAF, and HTN who was admitted to MICU for further management of a-fib with RVR. She additionally mentioned that she has been experiencing epigastric abdominal pain described as "feeling my pulse in my stomach." She is on chronic anticoagulation and chronic antibiotics, denies missing any doses.     Upon arrival to the ED, BP 99/62, , RR 20, & SpO2 100% on RA. On exam, she had an irregularly irregular heart beat and was tachycardic. Significant labs: Trop negative, BNP 1157, D-dimer 0.45, lactate 1.3, T bili 1.8, CBC unremarkable. EKG with a-fib with RVR; no STEMI. CXR with no acute process. CT chest with bilateral small pleural effusions & findings possibly due to increased pulmonary venous pressure. She was given 1 L NS bolus, metoprolol 5 mg IV x 3 (11:50, 14:02, 14:43), diltiazem 10 mg IV x 1, and Toprol 100 mg x 1 and later started on amio ggt by primary team. Cardiology consulted for management of Afib with RVR in the setting of Heart failure exacerbation given increase salt intake       Past Medical History:   Diagnosis Date    *Atrial fibrillation     Heart disease     Hypertension     Pacemaker " 11/25/2013    S/P Mechanical AVR 11/25/2013    S/P thoracic aortic aneurysm repair with Dacron graft (2007) 11/25/2013    S/P VSD surgical patch repair -1997 11/25/2013       Past Surgical History:   Procedure Laterality Date    ASCENDING AORTIC ANEURYSM REPAIR      BREAST SURGERY      CARDIAC CATHETERIZATION      CARDIAC PACEMAKER PLACEMENT      HYSTERECTOMY      MECHANICAL AORTIC VALVE REPLACEMENT         Review of patient's allergies indicates:   Allergen Reactions    Iodine Anaphylaxis     contrast    Lidocaine Anaphylaxis     cetacaine spray       No current facility-administered medications on file prior to encounter.     Current Outpatient Medications on File Prior to Encounter   Medication Sig    acetaminophen (TYLENOL) 500 MG tablet Take 1,000 mg by mouth daily as needed (pain/fever).    ALBUTEROL SULFATE (VENTOLIN HFA INHL) Inhale 1 puff into the lungs as needed (wheezing/shortness of breath).    alprazolam (XANAX) 0.25 MG tablet Take 0.25 mg by mouth 2 (two) times daily as needed for Anxiety.    amLODIPine (NORVASC) 5 MG tablet TAKE ONE TABLET BY MOUTH once a day AT NIGHT    cefadroxil (DURICEF) 500 MG Cap Take 2 capsules (1 g total) by mouth every 12 (twelve) hours.    EScitalopram oxalate (LEXAPRO) 10 MG tablet Take by mouth.    loratadine (CLARITIN) 10 mg tablet TAKE ONE TABLET BY MOUTH DAILY AS NEEDED FOR ALLERGIES    metoprolol succinate (TOPROL-XL) 50 MG 24 hr tablet Take 1 tablet every Morning and take 2 tablets in the evening    ofloxacin (FLOXIN) 0.3 % otic solution 5 drops once daily.    potassium chloride (MICRO-K) 10 MEQ CpSR Take 10 mEq by mouth 2 (two) times daily.     prednisoLONE acetate (PRED FORTE) 1 % DrpS Place 1 drop into the right eye 4 (four) times daily.    sotaloL (BETAPACE) 80 MG tablet Take 1 tablet (80 mg total) by mouth 2 (two) times daily. Please HOLD taking Sotalol until follow up with cardiologist outpatient.    valACYclovir (VALTREX) 1000 MG tablet  Take 1 tablet (1,000 mg total) by mouth 2 (two) times daily. for 5 days    valsartan (DIOVAN) 320 MG tablet Take 160 mg by mouth every 12 (twelve) hours. 1/2 in the morning 1/2 at night    warfarin (COUMADIN) 5 MG tablet On -, take 1.5 tablets (7.5 mg) by mouth daily. Starting , take 1 tablet (5 mg) by mouth daily or as directed by coumadin clinic    zolpidem (AMBIEN) 10 mg Tab Take 10 mg by mouth every evening.     Family History       Problem Relation (Age of Onset)    Arrhythmia Mother    Heart disease Mother, Father    Heart failure Mother    Hypertension Mother, Father          Tobacco Use    Smoking status: Former     Current packs/day: 0.00     Types: Cigarettes     Quit date: 2009     Years since quittin.8    Smokeless tobacco: Never   Substance and Sexual Activity    Alcohol use: No    Drug use: No    Sexual activity: Not on file     Review of Systems   Constitutional: Negative for malaise/fatigue.   Cardiovascular:  Positive for dyspnea on exertion, irregular heartbeat, orthopnea and paroxysmal nocturnal dyspnea. Negative for chest pain.   Respiratory:  Positive for shortness of breath.    All other systems reviewed and are negative.    Objective:     Vital Signs (Most Recent):  Temp: 98.1 °F (36.7 °C) (09/10/23 0000)  Pulse: 104 (09/10/23 1233)  Resp: (!) 27 (09/10/23 0749)  BP: 107/70 (09/10/23 1233)  SpO2: (!) 94 % (09/10/23 0749) Vital Signs (24h Range):  Temp:  [97.9 °F (36.6 °C)-98.3 °F (36.8 °C)] 98.1 °F (36.7 °C)  Pulse:  [] 104  Resp:  [14-39] 27  SpO2:  [89 %-99 %] 94 %  BP: ()/(50-92) 107/70     Weight: 68 kg (150 lb)  Body mass index is 26.57 kg/m².    SpO2: (!) 94 %         Intake/Output Summary (Last 24 hours) at 9/10/2023 1258  Last data filed at 9/10/2023 1200  Gross per 24 hour   Intake 557.79 ml   Output 1425 ml   Net -867.21 ml       Lines/Drains/Airways       Peripherally Inserted Central Catheter Line  Duration             PICC Double Lumen  05/23/22 1440 right basilic 474 days              Peripheral Intravenous Line  Duration                  Peripheral IV - Single Lumen 09/09/23 1149 20 G Posterior;Right Hand 1 day         Peripheral IV - Single Lumen 09/09/23 1913 20 G Distal;Left;Posterior Forearm <1 day         Peripheral IV - Single Lumen 09/09/23 2300 18 G Left Antecubital <1 day                     Physical Exam  Constitutional:       Appearance: Normal appearance.   Neck:      Vascular: JVD present.   Cardiovascular:      Rate and Rhythm: Tachycardia present. Rhythm irregular.   Pulmonary:      Effort: Pulmonary effort is normal.      Breath sounds: Normal breath sounds.   Abdominal:      Palpations: Abdomen is soft.   Musculoskeletal:      Right lower leg: No edema.      Left lower leg: No edema.   Neurological:      General: No focal deficit present.      Mental Status: She is alert and oriented to person, place, and time.   Psychiatric:         Mood and Affect: Mood normal.         Behavior: Behavior normal.          Significant Labs: All pertinent lab results from the last 24 hours have been reviewed.        Assessment and Plan:     * Atrial fibrillation with RVR  Patient with paroxysmal atrial fibrillation that was triggered by decompensated HFpEF. Patient on anticoagulation with coumadin for mechanical  AVR.  -Continue coumadin  -Can complete 24 hor amio load, no need for PO after  -Can continue metoprolol succinate 100mg daily   - Monitor electrolytes closely. Maintain Mg >2 and K >4      Acute decompensated diastolic heart failure  Patient with BNP 1157 and elevated JVD. Last EF on file 60%. Patient with increased salt intake as trigger   -Obtain TTE  -Lasix 40mg  IV bid   -Sodium restriction <2 g, Fluid restriction < 1500 mL, Strict I/Os, Daily weights      S/P Mechanical AVR secondary to bacterial endocarditis -1997, [facility] valve  -Continue coumadin 2.5-3.5 goal       VTE Risk Mitigation (From admission, onward)         Ordered      warfarin (COUMADIN) tablet 5 mg  Daily         09/09/23 0817                Thank you for your consult. I will follow-up with patient. Please contact us if you have any additional questions.    Shereen Hodge MD  Cardiology   [facility] - Cardiac Medical ICU

## 2023-09-10 NOTE — SUBJECTIVE & OBJECTIVE
Interval History/Significant Events: Transitioned to amiodarone infusion overnight with return to NSR. Continue infusion per Cardiology then maintain PO metoprolol w/o amiodarone.    3x melena overnight. GI consulted, may undergo endoscopy tomorrow. Discussed with cardiology, holding warfarin. No longer c/o abd pain    Hemodynamically stable, Hgb stable at 11      Review of Systems   Constitutional:  Negative for activity change, appetite change and fatigue.   HENT:  Negative for sinus pressure and sinus pain.    Eyes:  Negative for photophobia, redness and visual disturbance.   Respiratory:  Negative for cough, chest tightness and shortness of breath.    Cardiovascular:  Negative for chest pain, palpitations and leg swelling.   Gastrointestinal:  Positive for blood in stool and nausea. Negative for vomiting.   Genitourinary:  Negative for decreased urine volume, dysuria and flank pain.   Musculoskeletal:  Negative for gait problem and joint swelling.   Skin:  Negative for pallor, rash and wound.   Neurological:  Negative for syncope.   Psychiatric/Behavioral:  Negative for decreased concentration and dysphoric mood.      Objective:     Vital Signs (Most Recent):  Temp: 98.3 °F (36.8 °C) (09/10/23 1800)  Pulse: 106 (09/10/23 1800)  Resp: (!) 27 (09/10/23 1800)  BP: 97/62 (09/10/23 1800)  SpO2: 95 % (09/10/23 1800) Vital Signs (24h Range):  Temp:  [97.8 °F (36.6 °C)-98.3 °F (36.8 °C)] 98.3 °F (36.8 °C)  Pulse:  [] 106  Resp:  [15-41] 27  SpO2:  [89 %-99 %] 95 %  BP: ()/(50-92) 97/62   Weight: 68 kg (150 lb)  Body mass index is 26.57 kg/m².      Intake/Output Summary (Last 24 hours) at 9/10/2023 1839  Last data filed at 9/10/2023 1800  Gross per 24 hour   Intake 1348.09 ml   Output 1925 ml   Net -576.91 ml          Physical Exam  Vitals and nursing note reviewed.   Constitutional:       General: She is not in acute distress.     Appearance: Normal appearance.   HENT:      Head: Normocephalic and  atraumatic.   Cardiovascular:      Rate and Rhythm: Normal rate and regular rhythm.   Pulmonary:      Effort: Pulmonary effort is normal.      Breath sounds: Normal breath sounds.   Abdominal:      General: Abdomen is flat. There is no distension.      Palpations: Abdomen is soft.      Comments: No epigastric bruit noted.    Neurological:      General: No focal deficit present.      Mental Status: She is alert and oriented to person, place, and time.            Vents:     Lines/Drains/Airways       Peripheral Intravenous Line  Duration                  Peripheral IV - Single Lumen 09/09/23 1913 20 G Distal;Left;Posterior Forearm <1 day         Peripheral IV - Single Lumen 09/09/23 2300 18 G Left Antecubital <1 day                  Significant Labs:    CBC/Anemia Profile:  Recent Labs   Lab 09/09/23  2300 09/10/23  0328 09/10/23  0943   WBC 8.80 9.60 9.27   HGB 12.5 11.1* 11.8*   HCT 37.2 33.7* 36.7*    226 206   * 99* 103*   RDW 14.4 14.4 14.2        Chemistries:  Recent Labs   Lab 09/09/23  1144 09/10/23  0328    137   K 4.8 4.0    106   CO2 20* 19*   BUN 20 20   CREATININE 1.0 1.0   CALCIUM 9.2 8.5*   ALBUMIN 4.0 3.5   PROT 7.0 6.1   BILITOT 1.8* 1.6*   ALKPHOS 108 93   ALT 25 24   AST 32 32   MG  --  1.8   PHOS  --  3.1           Significant Imaging:  I have reviewed all pertinent imaging results/findings within the past 24 hours.

## 2023-09-10 NOTE — NURSING
Nurses Note -- 4 Eyes      9/9/2023  22:00      Skin assessed during: Admit      [x] No Altered Skin Integrity Present    [x]Prevention Measures Documented      [] Yes- Altered Skin Integrity Present or Discovered   [] LDA Added if Not in Epic (Describe Wound)   [] New Altered Skin Integrity was Present on Admit and Documented in LDA   [] Wound Image Taken    Wound Care Consulted? No    Attending Nurse:  Brenton Hanson RN    Second RN/Staff Member:  Lilian Adamson RN

## 2023-09-10 NOTE — ASSESSMENT & PLAN NOTE
S/P VSD surgical patch repair 1997  S/P thoracic aortic aneurysm repair with Dacron graft 2007    Follows with Dr Colton Gallegos in Interventional Cardiology. Anticoagulated with warfarin.    -- Continue warfarin while inpatient  -- Appreciate PharmD assistance in dosing adjustment while inpatient  -- Daily INR    9/10: holding warfarin for endoscopy tomorrow

## 2023-09-10 NOTE — SUBJECTIVE & OBJECTIVE
Past Medical History:   Diagnosis Date    *Atrial fibrillation     Heart disease     Hypertension     Pacemaker 11/25/2013    S/P Mechanical AVR 11/25/2013    S/P thoracic aortic aneurysm repair with Dacron graft (2007) 11/25/2013    S/P VSD surgical patch repair -1997 11/25/2013       Past Surgical History:   Procedure Laterality Date    ASCENDING AORTIC ANEURYSM REPAIR      BREAST SURGERY      CARDIAC CATHETERIZATION      CARDIAC PACEMAKER PLACEMENT      HYSTERECTOMY      MECHANICAL AORTIC VALVE REPLACEMENT         Review of patient's allergies indicates:   Allergen Reactions    Iodine Anaphylaxis     contrast    Lidocaine Anaphylaxis     cetacaine spray       No current facility-administered medications on file prior to encounter.     Current Outpatient Medications on File Prior to Encounter   Medication Sig    acetaminophen (TYLENOL) 500 MG tablet Take 1,000 mg by mouth daily as needed (pain/fever).    ALBUTEROL SULFATE (VENTOLIN HFA INHL) Inhale 1 puff into the lungs as needed (wheezing/shortness of breath).    alprazolam (XANAX) 0.25 MG tablet Take 0.25 mg by mouth 2 (two) times daily as needed for Anxiety.    amLODIPine (NORVASC) 5 MG tablet TAKE ONE TABLET BY MOUTH once a day AT NIGHT    cefadroxil (DURICEF) 500 MG Cap Take 2 capsules (1 g total) by mouth every 12 (twelve) hours.    EScitalopram oxalate (LEXAPRO) 10 MG tablet Take by mouth.    loratadine (CLARITIN) 10 mg tablet TAKE ONE TABLET BY MOUTH DAILY AS NEEDED FOR ALLERGIES    metoprolol succinate (TOPROL-XL) 50 MG 24 hr tablet Take 1 tablet every Morning and take 2 tablets in the evening    ofloxacin (FLOXIN) 0.3 % otic solution 5 drops once daily.    potassium chloride (MICRO-K) 10 MEQ CpSR Take 10 mEq by mouth 2 (two) times daily.     prednisoLONE acetate (PRED FORTE) 1 % DrpS Place 1 drop into the right eye 4 (four) times daily.    sotaloL (BETAPACE) 80 MG tablet Take 1 tablet (80 mg total) by mouth 2 (two) times daily. Please HOLD taking Sotalol  until follow up with cardiologist outpatient.    valACYclovir (VALTREX) 1000 MG tablet Take 1 tablet (1,000 mg total) by mouth 2 (two) times daily. for 5 days    valsartan (DIOVAN) 320 MG tablet Take 160 mg by mouth every 12 (twelve) hours. 1/2 in the morning 1/2 at night    warfarin (COUMADIN) 5 MG tablet On -, take 1.5 tablets (7.5 mg) by mouth daily. Starting , take 1 tablet (5 mg) by mouth daily or as directed by coumadin clinic    zolpidem (AMBIEN) 10 mg Tab Take 10 mg by mouth every evening.     Family History       Problem Relation (Age of Onset)    Arrhythmia Mother    Heart disease Mother, Father    Heart failure Mother    Hypertension Mother, Father          Tobacco Use    Smoking status: Former     Current packs/day: 0.00     Types: Cigarettes     Quit date: 2009     Years since quittin.8    Smokeless tobacco: Never   Substance and Sexual Activity    Alcohol use: No    Drug use: No    Sexual activity: Not on file     Review of Systems   Constitutional: Negative for malaise/fatigue.   Cardiovascular:  Positive for dyspnea on exertion, irregular heartbeat, orthopnea and paroxysmal nocturnal dyspnea. Negative for chest pain.   Respiratory:  Positive for shortness of breath.    All other systems reviewed and are negative.    Objective:     Vital Signs (Most Recent):  Temp: 98.1 °F (36.7 °C) (09/10/23 0000)  Pulse: 104 (09/10/23 1233)  Resp: (!) 27 (09/10/23 0749)  BP: 107/70 (09/10/23 1233)  SpO2: (!) 94 % (09/10/23 0749) Vital Signs (24h Range):  Temp:  [97.9 °F (36.6 °C)-98.3 °F (36.8 °C)] 98.1 °F (36.7 °C)  Pulse:  [] 104  Resp:  [14-39] 27  SpO2:  [89 %-99 %] 94 %  BP: ()/(50-92) 107/70     Weight: 68 kg (150 lb)  Body mass index is 26.57 kg/m².    SpO2: (!) 94 %         Intake/Output Summary (Last 24 hours) at 9/10/2023 1258  Last data filed at 9/10/2023 1200  Gross per 24 hour   Intake 557.79 ml   Output 1425 ml   Net -867.21 ml       Lines/Drains/Airways        Peripherally Inserted Central Catheter Line  Duration             PICC Double Lumen 05/23/22 1440 right basilic 474 days              Peripheral Intravenous Line  Duration                  Peripheral IV - Single Lumen 09/09/23 1149 20 G Posterior;Right Hand 1 day         Peripheral IV - Single Lumen 09/09/23 1913 20 G Distal;Left;Posterior Forearm <1 day         Peripheral IV - Single Lumen 09/09/23 2300 18 G Left Antecubital <1 day                     Physical Exam  Constitutional:       Appearance: Normal appearance.   Neck:      Vascular: JVD present.   Cardiovascular:      Rate and Rhythm: Tachycardia present. Rhythm irregular.   Pulmonary:      Effort: Pulmonary effort is normal.      Breath sounds: Normal breath sounds.   Abdominal:      Palpations: Abdomen is soft.   Musculoskeletal:      Right lower leg: No edema.      Left lower leg: No edema.   Neurological:      General: No focal deficit present.      Mental Status: She is alert and oriented to person, place, and time.   Psychiatric:         Mood and Affect: Mood normal.         Behavior: Behavior normal.          Significant Labs: All pertinent lab results from the last 24 hours have been reviewed.

## 2023-09-10 NOTE — PROGRESS NOTES
"[facility] - Card[facility]  Critical Care Medicine  Progress Note    Patient Name: Naina Keyes  MRN: [id number]  Admission Date: 9/9/2023  Hospital Length of Stay: 1 days  Code Status: Full Code  Attending Provider: Bob Morrison MD  Primary Care Provider: Anjana Torres NP   Principal Problem: Atrial fibrillation with RVR    Subjective:     HPI:  Naina Keyes is a 61 yo F with an extensive cardiac history including mechanical AVR 2/2 bacterial endocarditis, TAA repair with Dacron graft, VSD surgical patch repair, s/p pacemaker, pAF, and HTN who is being admitted to MICU for further management of a-fib with RVR requiring titrating diltiazem drip. She initially presented to the ED complaining of intermittent palpitations x 1 week. During the episodes of palpitations, she also experienced concomitant SOB, nausea, fatigue, and generalized weakness. This morning, her symptoms were sustained and more severe, which prompted her to come to the ED. During our interview, she additionally mentioned that she has been experiencing epigastric abdominal pain described as "feeling my pulse in my stomach." She is on chronic anticoagulation and chronic antibiotics, denies missing any doses.     Upon arrival to the ED, BP 99/62, , RR 20, & SpO2 100% on RA. On exam, she had an irregularly irregular heart beat and was tachycardic. Significant labs: Trop negative, BNP 1157, D-dimer 0.45, lactate 1.3, T bili 1.8, CBC unremarkable. EKG with a-fib with RVR; no STEMI. CXR with no acute process. CT chest with bilateral small pleural effusions & findings possibly due to increased pulmonary venous pressure. She was given 1 L NS bolus, metoprolol 5 mg IV x 3 (11:50, 14:02, 14:43), diltiazem 10 mg IV x 1, and Toprol 100 mg x 1.       Hospital/ICU Course:  No notes on file    Interval History/Significant Events: Transitioned to amiodarone infusion overnight with return to NSR. Continue infusion per Cardiology then maintain PO " metoprolol w/o amiodarone.    3x melena overnight. GI consulted, may undergo endoscopy tomorrow. Discussed with cardiology, holding warfarin. No longer c/o abd pain    Hemodynamically stable, Hgb stable at 11      Review of Systems   Constitutional:  Negative for activity change, appetite change and fatigue.   HENT:  Negative for sinus pressure and sinus pain.    Eyes:  Negative for photophobia, redness and visual disturbance.   Respiratory:  Negative for cough, chest tightness and shortness of breath.    Cardiovascular:  Negative for chest pain, palpitations and leg swelling.   Gastrointestinal:  Positive for blood in stool and nausea. Negative for vomiting.   Genitourinary:  Negative for decreased urine volume, dysuria and flank pain.   Musculoskeletal:  Negative for gait problem and joint swelling.   Skin:  Negative for pallor, rash and wound.   Neurological:  Negative for syncope.   Psychiatric/Behavioral:  Negative for decreased concentration and dysphoric mood.      Objective:     Vital Signs (Most Recent):  Temp: 98.3 °F (36.8 °C) (09/10/23 1800)  Pulse: 106 (09/10/23 1800)  Resp: (!) 27 (09/10/23 1800)  BP: 97/62 (09/10/23 1800)  SpO2: 95 % (09/10/23 1800) Vital Signs (24h Range):  Temp:  [97.8 °F (36.6 °C)-98.3 °F (36.8 °C)] 98.3 °F (36.8 °C)  Pulse:  [] 106  Resp:  [15-41] 27  SpO2:  [89 %-99 %] 95 %  BP: ()/(50-92) 97/62   Weight: 68 kg (150 lb)  Body mass index is 26.57 kg/m².      Intake/Output Summary (Last 24 hours) at 9/10/2023 1839  Last data filed at 9/10/2023 1800  Gross per 24 hour   Intake 1348.09 ml   Output 1925 ml   Net -576.91 ml          Physical Exam  Vitals and nursing note reviewed.   Constitutional:       General: She is not in acute distress.     Appearance: Normal appearance.   HENT:      Head: Normocephalic and atraumatic.   Cardiovascular:      Rate and Rhythm: Normal rate and regular rhythm.   Pulmonary:      Effort: Pulmonary effort is normal.      Breath sounds:  "Normal breath sounds.   Abdominal:      General: Abdomen is flat. There is no distension.      Palpations: Abdomen is soft.      Comments: No epigastric bruit noted.    Neurological:      General: No focal deficit present.      Mental Status: She is alert and oriented to person, place, and time.            Vents:     Lines/Drains/Airways       Peripheral Intravenous Line  Duration                  Peripheral IV - Single Lumen 09/09/23 1913 20 G Distal;Left;Posterior Forearm <1 day         Peripheral IV - Single Lumen 09/09/23 2300 18 G Left Antecubital <1 day                  Significant Labs:    CBC/Anemia Profile:  Recent Labs   Lab 09/09/23  2300 09/10/23  0328 09/10/23  0943   WBC 8.80 9.60 9.27   HGB 12.5 11.1* 11.8*   HCT 37.2 33.7* 36.7*    226 206   * 99* 103*   RDW 14.4 14.4 14.2        Chemistries:  Recent Labs   Lab 09/09/23  1144 09/10/23  0328    137   K 4.8 4.0    106   CO2 20* 19*   BUN 20 20   CREATININE 1.0 1.0   CALCIUM 9.2 8.5*   ALBUMIN 4.0 3.5   PROT 7.0 6.1   BILITOT 1.8* 1.6*   ALKPHOS 108 93   ALT 25 24   AST 32 32   MG  --  1.8   PHOS  --  3.1           Significant Imaging:  I have reviewed all pertinent imaging results/findings within the past 24 hours.      ABG  No results for input(s): "PH", "PO2", "PCO2", "HCO3", "BE" in the last 168 hours.  Assessment/Plan:     Cardiac/Vascular  * Atrial fibrillation with RVR  Patient with extensive cardiac history presented to the ED c/o one-week history of intermittent palpitations associated with SOB, nausea, fatigue, and generalized weakness. In the ED, EKG with a-fib with RVR. She was given metoprolol 5 mg IV x 3 and diltiazem 10 mg IV x 1 with no change in her heart rhythm. She is being admitted to the ICU for titrating diltiazem drip.     Decompensation: Etiology may be 2/2 hypervolemia s/o ADHF  Current rhythm: AF with RVR  Home meds: States previous history of AF RVR (documented history of metoprolol and sotalol, was " supposed to follow up with cardiologist OP for regimen clarification)    -- Rate control: diltiazem gtt with goal HR < 110; titrate off and substitute with home beta-blocker (titrate to goal rate)   - transitioned to amiodarone infusion. Now in NSR. Discussed with Cardiology, appreciate recs    - complete infusion and maintain PO metoprolol, no PO amiodarone following infusion  -- Patient anticoagulated with warfarin for mechanical aortic valve   - holding for endoscopy  -- Cardiac telemetry  -- Maintain K > 4, Mg > 2, Ca wnl; replete PRN  -- STAT cardiology consult if hemodynamic instability for DCCV evaluation    Essential hypertension  Blood Pressure: 120s-130s SBP  Home Medications: valsartan 160 bid, amlodipine 5, metoprolol 100 qd    -- Restart PO home medications as tolerated; holding s/o normotension and GI bleed      Acute decompensated heart failure  Patient with recent onset of orthopnea, increased cough with laying back. Patient is not on a home diuretic regimen, nor does she believe she has been diagnosed with heart failure. She has an extensive cardiac history, however, and states she may have coded on a procedure table 30+ years ago when her initial AVR was placed.    TTE (05/2022) EF 60%  Home meds: Valsartan, Toprol  Dry weight: Unknown  Diagnostics: CXR coarse interstitial opacities relatively stable, no pleural effusion; BNP 1000+; Lactate 1.3; O2 requirement: None    -- Aggressive diuresis: Lasix 40 qd; titrate to clinical response and UOP goal  -- Strict I/Os; goal net negative 1.5 - 2 L / day  -- Daily weights (standing if tolerated)  -- Fluid restriction at 1500mL  -- Cardiac diet w/ 2 g Na restriction  -- GDMT held s/o active GI bleed and normal pressures; will restart when clinically appropriate  -- Repeat TTE pending      S/P Mechanical AVR secondary to bacterial endocarditis -1997, [employer] valve  S/P VSD surgical patch repair 1997  S/P thoracic aortic aneurysm repair with Dacron graft  2007    Follows with Dr Colton Gallegos in Interventional Cardiology. Anticoagulated with warfarin.    -- Continue warfarin while inpatient  -- Appreciate PharmD assistance in dosing adjustment while inpatient  -- Daily INR    9/10: holding warfarin for endoscopy tomorrow    GI  Gastrointestinal hemorrhage with melena  Symptoms: 3x witnessed melenic stools while inpatient, epigastric pain  Diagnostics: Hgb 12.5 (baseline 11-12s), MCV 100s, , Coag INR 2.8  APT / AC: Warfarin for aortic mechanical valve    -- GI consult for EGD / C-Scope consult for source evaluation +/- intervention  -- IV Protonix 80mg x 1, followed by IV 40mg BID thereafter  -- NPO  -- Trend CBCs ; Transfuse Hgb < 7  -- Resuscitate IVF PRN  -- Cardiac telemetry  -- Maintain 2 large bore IVs  -- If hemodynamically unstable +/- new bleed, stat CTA / consult IR for embolization evaluation  -- Holding warfarin for endoscopy. Planned 9/11, will start heparin/lovenox if delayed to 9/12         Critical Care Daily Checklist:    A: Awake: RASS Goal/Actual Goal: RASS Goal: 0-->alert and calm  Actual:     B: Spontaneous Breathing Trial Performed?     C: SAT & SBT Coordinated?  n/a                      D: Delirium: CAM-ICU Overall CAM-ICU: Negative   E: Early Mobility Performed? Yes   F: Feeding Goal:    Status:     Current Diet Order   Procedures    Diet NPO Except for: Medication, Ice Chips     Order Specific Question:   Except for     Answer:   Medication     Order Specific Question:   Except for     Answer:   Ice Chips    Diet Cardiac Fluid - 1500mL     Order Specific Question:   Fluid restriction:     Answer:   Fluid - 1500mL      AS: Analgesia/Sedation n/a   T: Thromboembolic Prophylaxis Holding warfarin   H: HOB > 300 Yes   U: Stress Ulcer Prophylaxis (if needed) y   G: Glucose Control y   B: Bowel Function Stool Occurrence: 2   I: Indwelling Catheter (Lines & Don) Necessity n/a   D: De-escalation of Antimicrobials/Pharmacotherapies D/c abx     Plan for the day/ETD stepdown    Code Status:  Family/Goals of Care: Full Code         Critical secondary to Patient has a condition that poses threat to life and bodily function: afib RVR      Critical care was time spent personally by me on the following activities: development of treatment plan with patient or surrogate and bedside caregivers, discussions with consultants, evaluation of patient's response to treatment, examination of patient, ordering and performing treatments and interventions, ordering and review of laboratory studies, ordering and review of radiographic studies, pulse oximetry, re-evaluation of patient's condition. This critical care time did not overlap with that of any other provider or involve time for any procedures.     Maile Byrnes MD  Critical Care Medicine  [facility] - Cardiac Medical ICU

## 2023-09-10 NOTE — HPI
Naina Keyes is a 62 year old female for whom GI is consulted for melena. She has a medical history significant for mechanical AVR 2/2 bacterial endocarditis (on coumadin, INR 2.5-3.5), TAA repair with Dacron graft, VSD surgical patch repair, s/p pacemaker, colon polyps, pAF, and HTN.     She initially presented to [facility] on [place]09 reporting a one week history of palpitations and fatigue. On arrival, she was in A-fib RVR with HR in the 40s and soft blood pressure (99/62), otherwise afebrile and on room air. Laboratory workup notable for macrocytic anemia with Hgb 12.5 (baseline 11-12), INR 2.8 (within goal) and elevated BNP. She reported having three episodes of black stools on 9/09 but has not had any since then. Denies any hematemesis, hematochezia or abdominal pain. Denies any NSAID use, iron or pepto bismol. She was treated with IVFs, beta-blockers and started on a diltiazem gtt. Her hgb was 11.1 on 9/10, again at her baseline with no further episodes of melena.    Most recent endoscopic history:  EGD 10/2018: gastritis.  Colonoscopy 10/2018: small sessile polyp in the cecum, small polyp in the sigmoid.

## 2023-09-10 NOTE — HPI
"Naina Keyes is a 61 yo F with an extensive cardiac history including mechanical AVR 2/2 bacterial endocarditis, TAA repair with Dacron graft, VSD surgical patch repair, s/p pacemaker, pAF, and HTN who was admitted to MICU for further management of a-fib with RVR. She additionally mentioned that she has been experiencing epigastric abdominal pain described as "feeling my pulse in my stomach." She is on chronic anticoagulation and chronic antibiotics, denies missing any doses.     Upon arrival to the ED, BP 99/62, , RR 20, & SpO2 100% on RA. On exam, she had an irregularly irregular heart beat and was tachycardic. Significant labs: Trop negative, BNP 1157, D-dimer 0.45, lactate 1.3, T bili 1.8, CBC unremarkable. EKG with a-fib with RVR; no STEMI. CXR with no acute process. CT chest with bilateral small pleural effusions & findings possibly due to increased pulmonary venous pressure. She was given 1 L NS bolus, metoprolol 5 mg IV x 3 (11:50, 14:02, 14:43), diltiazem 10 mg IV x 1, and Toprol 100 mg x 1 and later started on amio ggt by primary team. Cardiology consulted for management of Afib with RVR in the setting of Heart failure exacerbation given increase salt intake   "

## 2023-09-10 NOTE — CONSULTS
[facility] - Cardiac Medical [facility]  Gastroenterology  Consult Note    Patient Name: Naina Keyes  MRN: [id number]  Admission Date: 9/9/2023  Hospital Length of Stay: 1 days  Code Status: Full Code   Attending Provider: Bob Morrison MD   Consulting Provider: Karen Coley MD  Primary Care Physician: Anjana Torres NP  Principal Problem:Atrial fibrillation with RVR    Inpatient consult to Gastroenterology  Consult performed by: Karen Coley MD  Consult ordered by: Francis Gonzalez MD        Subjective:     HPI:  Naina Keyes is a 62 year old female for whom GI is consulted for melena. She has a medical history significant for mechanical AVR 2/2 bacterial endocarditis (on coumadin, INR 2.5-3.5), TAA repair with Dacron graft, VSD surgical patch repair, s/p pacemaker, colon polyps, pAF, and HTN.     She initially presented to [facility] on 9/09 reporting a one week history of palpitations and fatigue. On arrival, she was in A-fib RVR with HR in the 40s and soft blood pressure (99/62), otherwise afebrile and on room air. Laboratory workup notable for macrocytic anemia with Hgb 12.5 (baseline 11-12), INR 2.8 (within goal) and elevated BNP. She reported having three episodes of black stools on 9/09 but has not had any since then. Denies any hematemesis, hematochezia or abdominal pain. Denies any NSAID use, iron or pepto bismol. She was treated with IVFs, beta-blockers and started on a diltiazem gtt. Her hgb was 11.1 on 9/10, again at her baseline with no further episodes of melena.    Most recent endoscopic history:  EGD 10/2018: gastritis.  Colonoscopy 10/2018: small sessile polyp in the cecum, small polyp in the sigmoid.      Past Medical History:   Diagnosis Date    *Atrial fibrillation     Heart disease     Hypertension     Pacemaker 11/25/2013    S/P Mechanical AVR 11/25/2013    S/P thoracic aortic aneurysm repair with Dacron graft (2007) 11/25/2013    S/P VSD surgical patch repair -1997 11/25/2013        Past Surgical History:   Procedure Laterality Date    ASCENDING AORTIC ANEURYSM REPAIR      BREAST SURGERY      CARDIAC CATHETERIZATION      CARDIAC PACEMAKER PLACEMENT      HYSTERECTOMY      MECHANICAL AORTIC VALVE REPLACEMENT         Review of patient's allergies indicates:   Allergen Reactions    Iodine Anaphylaxis     contrast    Lidocaine Anaphylaxis     cetacaine spray     Family History       Problem Relation (Age of Onset)    Arrhythmia Mother    Heart disease Mother, Father    Heart failure Mother    Hypertension Mother, Father          Tobacco Use    Smoking status: Former     Current packs/day: 0.00     Types: Cigarettes     Quit date: 2009     Years since quittin.8    Smokeless tobacco: Never   Substance and Sexual Activity    Alcohol use: No    Drug use: No    Sexual activity: Not on file     Review of Systems   Constitutional:  Negative for fever.   Gastrointestinal:  Positive for blood in stool. Negative for abdominal pain, nausea and vomiting.     Objective:     Vital Signs (Most Recent):  Temp: 98.1 °F (36.7 °C) (09/10/23 0000)  Pulse: 103 (09/10/23 09)  Resp: (!) 27 (09/10/23 0749)  BP: 116/62 (09/10/23 09)  SpO2: (!) 94 % (09/10/23 0749) Vital Signs (24h Range):  Temp:  [97.9 °F (36.6 °C)-98.3 °F (36.8 °C)] 98.1 °F (36.7 °C)  Pulse:  [] 103  Resp:  [13-39] 27  SpO2:  [89 %-100 %] 94 %  BP: ()/(50-92) 116/62     Weight: 68 kg (150 lb) (23 1034)  Body mass index is 26.57 kg/m².      Intake/Output Summary (Last 24 hours) at 9/10/2023 1001  Last data filed at 9/10/2023 0645  Gross per 24 hour   Intake 303.93 ml   Output 100 ml   Net 203.93 ml       Lines/Drains/Airways       Peripherally Inserted Central Catheter Line  Duration             PICC Double Lumen 22 1440 right basilic 474 days              Peripheral Intravenous Line  Duration                  Peripheral IV - Single Lumen 23 1149 20 G Posterior;Right Hand <1 day          Peripheral IV - Single Lumen 09/09/23 1913 20 G Distal;Left;Posterior Forearm <1 day         Peripheral IV - Single Lumen 09/09/23 2300 18 G Left Antecubital <1 day                     Physical Exam  Vitals reviewed.   Constitutional:       General: She is not in acute distress.  HENT:      Head: Normocephalic.   Eyes:      Extraocular Movements: Extraocular movements intact.   Cardiovascular:      Rate and Rhythm: Tachycardia present.   Pulmonary:      Effort: Pulmonary effort is normal. No respiratory distress.   Abdominal:      General: There is no distension.   Neurological:      Mental Status: She is alert. Mental status is at baseline.          Significant Labs:  All pertinent lab results from the last 24 hours have been reviewed.      Assessment/Plan:     GI  Gastrointestinal hemorrhage with melena  62 year old female with mechanical AVR 2/2 bacterial endocarditis (on coumadin, INR 2.5-3.5), TAA repair with Dacron graft, VSD surgical patch repair, s/p pacemaker, colon polyps, pAF, and HTN admitted to the MICU on 9/10 with A-fib RVR. GI consulted for three reports of melena on 9/9 in the setting of stable anemia. Melena noted on rectal exam.    Recommendations:  - NPO at midnight for possible EGD 9/11/23 pending morning INR.  - Trend Hgb q12hrs. Transfuse for Hgb > 7, unless otherwise indicated  - Bolus IV pantoprazole 80mg followed by 40mg BID  - Maintain IV access with 2 large bore Ivs  - Intravascular resuscitation/support with IVFs   - Hold all NSAIDs and anticoagulants, unless contraindicated  - Please correct any coagulopathy with platelets and FFP for goal of platelets >50K and INR <2.0  - Please notify GI team if there is significant change in patient's clinical status        Thank you for your consult. I will follow-up with patient. Please contact us if you have any additional questions.    Karen Coley MD  Gastroenterology  [facility] - Cardiac Medical ICU

## 2023-09-10 NOTE — ASSESSMENT & PLAN NOTE
Symptoms: 3x witnessed melenic stools while inpatient, epigastric pain  Diagnostics: Hgb 12.5 (baseline 11-12s), MCV 100s, , Coag INR 2.8  APT / AC: Warfarin for aortic mechanical valve    -- GI consult for EGD / C-Scope consult for source evaluation +/- intervention  -- IV Protonix 80mg x 1, followed by IV 40mg BID thereafter  -- NPO  -- Trend CBCs ; Transfuse Hgb < 7  -- Resuscitate IVF PRN  -- Cardiac telemetry  -- Maintain 2 large bore IVs  -- If hemodynamically unstable +/- new bleed, stat CTA / consult IR for embolization evaluation  -- Holding warfarin for endoscopy. Planned 9/11, will start heparin/lovenox if delayed to 9/12

## 2023-09-10 NOTE — ASSESSMENT & PLAN NOTE
62 year old female with mechanical AVR 2/2 bacterial endocarditis (on coumadin, INR 2.5-3.5), TAA repair with Dacron graft, VSD surgical patch repair, s/p pacemaker, colon polyps, pAF, and HTN admitted to the MICU on 9/10 with A-fib RVR. GI consulted for three reports of melena on 9/9 in the setting of stable anemia. Melena noted on rectal exam.    Recommendations:  - NPO at midnight for possible EGD 9/11/23 pending morning INR.  - Trend Hgb q12hrs. Transfuse for Hgb > 7, unless otherwise indicated  - Bolus IV pantoprazole 80mg followed by 40mg BID  - Maintain IV access with 2 large bore Ivs  - Intravascular resuscitation/support with IVFs   - Hold all NSAIDs and anticoagulants, unless contraindicated  - Please correct any coagulopathy with platelets and FFP for goal of platelets >50K and INR <2.0  - Please notify GI team if there is significant change in patient's clinical status

## 2023-09-11 ENCOUNTER — ANESTHESIA EVENT (OUTPATIENT)
Dept: ENDOSCOPY | Facility: HOSPITAL | Age: 63
DRG: 308 | End: 2023-09-11
Payer: MEDICARE

## 2023-09-11 ENCOUNTER — ANESTHESIA (OUTPATIENT)
Dept: ENDOSCOPY | Facility: HOSPITAL | Age: 63
DRG: 308 | End: 2023-09-11
Payer: MEDICARE

## 2023-09-11 LAB
ALBUMIN SERPL BCP-MCNC: 3.6 G/DL (ref 3.5–5.2)
ALP SERPL-CCNC: 93 U/L (ref 55–135)
ALT SERPL W/O P-5'-P-CCNC: 27 U/L (ref 10–44)
ANION GAP SERPL CALC-SCNC: 13 MMOL/L (ref 8–16)
ASCENDING AORTA: 3.66 CM
AST SERPL-CCNC: 28 U/L (ref 10–40)
AV INDEX (PROSTH): 0.73
AV MEAN GRADIENT: 12 MMHG
AV PEAK GRADIENT: 26 MMHG
AV VALVE AREA BY VELOCITY RATIO: 1.92 CM²
AV VALVE AREA: 2.06 CM²
AV VELOCITY RATIO: 0.69
BASOPHILS # BLD AUTO: 0.04 K/UL (ref 0–0.2)
BASOPHILS # BLD AUTO: 0.06 K/UL (ref 0–0.2)
BASOPHILS # BLD AUTO: 0.07 K/UL (ref 0–0.2)
BASOPHILS NFR BLD: 0.5 % (ref 0–1.9)
BASOPHILS NFR BLD: 0.7 % (ref 0–1.9)
BASOPHILS NFR BLD: 0.9 % (ref 0–1.9)
BILIRUB SERPL-MCNC: 1.9 MG/DL (ref 0.1–1)
BSA FOR ECHO PROCEDURE: 1.74 M2
BUN SERPL-MCNC: 20 MG/DL (ref 8–23)
CALCIUM SERPL-MCNC: 8.9 MG/DL (ref 8.7–10.5)
CHLORIDE SERPL-SCNC: 101 MMOL/L (ref 95–110)
CO2 SERPL-SCNC: 26 MMOL/L (ref 23–29)
CREAT SERPL-MCNC: 1 MG/DL (ref 0.5–1.4)
CV ECHO LV RWT: 0.49 CM
DIFFERENTIAL METHOD: ABNORMAL
DOP CALC AO PEAK VEL: 2.55 M/S
DOP CALC AO VTI: 55.88 CM
DOP CALC LVOT AREA: 2.8 CM2
DOP CALC LVOT DIAMETER: 1.89 CM
DOP CALC LVOT PEAK VEL: 1.75 M/S
DOP CALC LVOT STROKE VOLUME: 115.11 CM3
DOP CALCLVOT PEAK VEL VTI: 41.05 CM
E WAVE DECELERATION TIME: 130.35 MSEC
E/A RATIO: 1.62
E/E' RATIO: 13.2 M/S
ECHO LV POSTERIOR WALL: 1.17 CM (ref 0.6–1.1)
EJECTION FRACTION: 55 %
EOSINOPHIL # BLD AUTO: 0.2 K/UL (ref 0–0.5)
EOSINOPHIL # BLD AUTO: 0.3 K/UL (ref 0–0.5)
EOSINOPHIL # BLD AUTO: 0.3 K/UL (ref 0–0.5)
EOSINOPHIL NFR BLD: 2.8 % (ref 0–8)
EOSINOPHIL NFR BLD: 3.2 % (ref 0–8)
EOSINOPHIL NFR BLD: 3.6 % (ref 0–8)
ERYTHROCYTE [DISTWIDTH] IN BLOOD BY AUTOMATED COUNT: 14.2 % (ref 11.5–14.5)
ERYTHROCYTE [DISTWIDTH] IN BLOOD BY AUTOMATED COUNT: 14.5 % (ref 11.5–14.5)
ERYTHROCYTE [DISTWIDTH] IN BLOOD BY AUTOMATED COUNT: 14.7 % (ref 11.5–14.5)
EST. GFR  (NO RACE VARIABLE): >60 ML/MIN/1.73 M^2
FRACTIONAL SHORTENING: 25 % (ref 28–44)
GLUCOSE SERPL-MCNC: 114 MG/DL (ref 70–110)
HCT VFR BLD AUTO: 33.7 % (ref 37–48.5)
HCT VFR BLD AUTO: 35.2 % (ref 37–48.5)
HCT VFR BLD AUTO: 39.4 % (ref 37–48.5)
HGB BLD-MCNC: 11.1 G/DL (ref 12–16)
HGB BLD-MCNC: 12.5 G/DL (ref 12–16)
HGB BLD-MCNC: 12.9 G/DL (ref 12–16)
IMM GRANULOCYTES # BLD AUTO: 0.01 K/UL (ref 0–0.04)
IMM GRANULOCYTES # BLD AUTO: 0.02 K/UL (ref 0–0.04)
IMM GRANULOCYTES # BLD AUTO: 0.03 K/UL (ref 0–0.04)
IMM GRANULOCYTES NFR BLD AUTO: 0.1 % (ref 0–0.5)
IMM GRANULOCYTES NFR BLD AUTO: 0.2 % (ref 0–0.5)
IMM GRANULOCYTES NFR BLD AUTO: 0.4 % (ref 0–0.5)
INR PPP: 2.3 (ref 0.8–1.2)
INTERVENTRICULAR SEPTUM: 0.74 CM (ref 0.6–1.1)
IVRT: 51.38 MSEC
LA MAJOR: 4.62 CM
LA MINOR: 4.41 CM
LA WIDTH: 4.3 CM
LEFT ATRIUM SIZE: 2.67 CM
LEFT ATRIUM VOLUME INDEX MOD: 44.3 ML/M2
LEFT ATRIUM VOLUME INDEX: 25.8 ML/M2
LEFT ATRIUM VOLUME MOD: 75.83 CM3
LEFT ATRIUM VOLUME: 44.04 CM3
LEFT INTERNAL DIMENSION IN SYSTOLE: 3.57 CM (ref 2.1–4)
LEFT VENTRICLE DIASTOLIC VOLUME INDEX: 62.5 ML/M2
LEFT VENTRICLE DIASTOLIC VOLUME: 106.88 ML
LEFT VENTRICLE MASS INDEX: 93 G/M2
LEFT VENTRICLE SYSTOLIC VOLUME INDEX: 31.2 ML/M2
LEFT VENTRICLE SYSTOLIC VOLUME: 53.27 ML
LEFT VENTRICULAR INTERNAL DIMENSION IN DIASTOLE: 4.79 CM (ref 3.5–6)
LEFT VENTRICULAR MASS: 159.4 G
LV LATERAL E/E' RATIO: 11 M/S
LV SEPTAL E/E' RATIO: 16.5 M/S
LYMPHOCYTES # BLD AUTO: 1.2 K/UL (ref 1–4.8)
LYMPHOCYTES # BLD AUTO: 1.4 K/UL (ref 1–4.8)
LYMPHOCYTES # BLD AUTO: 1.6 K/UL (ref 1–4.8)
LYMPHOCYTES NFR BLD: 15.2 % (ref 18–48)
LYMPHOCYTES NFR BLD: 16.4 % (ref 18–48)
LYMPHOCYTES NFR BLD: 19.5 % (ref 18–48)
MAGNESIUM SERPL-MCNC: 1.9 MG/DL (ref 1.6–2.6)
MCH RBC QN AUTO: 32.3 PG (ref 27–31)
MCH RBC QN AUTO: 33.7 PG (ref 27–31)
MCH RBC QN AUTO: 36 PG (ref 27–31)
MCHC RBC AUTO-ENTMCNC: 31.7 G/DL (ref 32–36)
MCHC RBC AUTO-ENTMCNC: 32.9 G/DL (ref 32–36)
MCHC RBC AUTO-ENTMCNC: 36.6 G/DL (ref 32–36)
MCV RBC AUTO: 102 FL (ref 82–98)
MCV RBC AUTO: 102 FL (ref 82–98)
MCV RBC AUTO: 98 FL (ref 82–98)
MONOCYTES # BLD AUTO: 0.6 K/UL (ref 0.3–1)
MONOCYTES # BLD AUTO: 0.7 K/UL (ref 0.3–1)
MONOCYTES # BLD AUTO: 0.8 K/UL (ref 0.3–1)
MONOCYTES NFR BLD: 10.3 % (ref 4–15)
MONOCYTES NFR BLD: 7.6 % (ref 4–15)
MONOCYTES NFR BLD: 9.2 % (ref 4–15)
MV PEAK A VEL: 0.61 M/S
MV PEAK E VEL: 0.99 M/S
MV STENOSIS PRESSURE HALF TIME: 37.8 MS
MV VALVE AREA P 1/2 METHOD: 5.82 CM2
NEUTROPHILS # BLD AUTO: 5.4 K/UL (ref 1.8–7.7)
NEUTROPHILS # BLD AUTO: 5.6 K/UL (ref 1.8–7.7)
NEUTROPHILS # BLD AUTO: 6.1 K/UL (ref 1.8–7.7)
NEUTROPHILS NFR BLD: 66.3 % (ref 38–73)
NEUTROPHILS NFR BLD: 71 % (ref 38–73)
NEUTROPHILS NFR BLD: 72.1 % (ref 38–73)
NRBC BLD-RTO: 0 /100 WBC
PHOSPHATE SERPL-MCNC: 2.4 MG/DL (ref 2.7–4.5)
PISA TR MAX VEL: 2.31 M/S
PLATELET # BLD AUTO: 207 K/UL (ref 150–450)
PLATELET # BLD AUTO: 208 K/UL (ref 150–450)
PLATELET # BLD AUTO: 225 K/UL (ref 150–450)
PMV BLD AUTO: 10 FL (ref 9.2–12.9)
PMV BLD AUTO: 10 FL (ref 9.2–12.9)
PMV BLD AUTO: 10.3 FL (ref 9.2–12.9)
POTASSIUM SERPL-SCNC: 3.1 MMOL/L (ref 3.5–5.1)
PROT SERPL-MCNC: 6.3 G/DL (ref 6–8.4)
PROTHROMBIN TIME: 23 SEC (ref 9–12.5)
RA MAJOR: 4.57 CM
RA PRESSURE ESTIMATED: 15 MMHG
RA WIDTH: 3.98 CM
RBC # BLD AUTO: 3.29 M/UL (ref 4–5.4)
RBC # BLD AUTO: 3.58 M/UL (ref 4–5.4)
RBC # BLD AUTO: 3.87 M/UL (ref 4–5.4)
RIGHT VENTRICULAR END-DIASTOLIC DIMENSION: 3.53 CM
RV TB RVSP: 17 MMHG
SINUS: 2.91 CM
SODIUM SERPL-SCNC: 140 MMOL/L (ref 136–145)
STJ: 2.61 CM
TDI LATERAL: 0.09 M/S
TDI SEPTAL: 0.06 M/S
TDI: 0.08 M/S
TR MAX PG: 21 MMHG
TRICUSPID ANNULAR PLANE SYSTOLIC EXCURSION: 1.75 CM
TV REST PULMONARY ARTERY PRESSURE: 36 MMHG
WBC # BLD AUTO: 7.84 K/UL (ref 3.9–12.7)
WBC # BLD AUTO: 8.09 K/UL (ref 3.9–12.7)
WBC # BLD AUTO: 8.47 K/UL (ref 3.9–12.7)
Z-SCORE OF LEFT VENTRICULAR DIMENSION IN END DIASTOLE: 0.09
Z-SCORE OF LEFT VENTRICULAR DIMENSION IN END SYSTOLE: 1.53

## 2023-09-11 PROCEDURE — 37000008 HC ANESTHESIA 1ST 15 MINUTES: Performed by: INTERNAL MEDICINE

## 2023-09-11 PROCEDURE — 85025 COMPLETE CBC W/AUTO DIFF WBC: CPT

## 2023-09-11 PROCEDURE — 25000003 PHARM REV CODE 250

## 2023-09-11 PROCEDURE — 99232 SBSQ HOSP IP/OBS MODERATE 35: CPT | Mod: 25,,,

## 2023-09-11 PROCEDURE — 37000009 HC ANESTHESIA EA ADD 15 MINS: Performed by: INTERNAL MEDICINE

## 2023-09-11 PROCEDURE — D9220A PRA ANESTHESIA: ICD-10-PCS | Mod: CRNA,,, | Performed by: NURSE ANESTHETIST, CERTIFIED REGISTERED

## 2023-09-11 PROCEDURE — C9113 INJ PANTOPRAZOLE SODIUM, VIA: HCPCS

## 2023-09-11 PROCEDURE — A4216 STERILE WATER/SALINE, 10 ML: HCPCS | Performed by: ANESTHESIOLOGY

## 2023-09-11 PROCEDURE — 25000003 PHARM REV CODE 250: Performed by: ANESTHESIOLOGY

## 2023-09-11 PROCEDURE — 20600001 HC STEP DOWN PRIVATE ROOM

## 2023-09-11 PROCEDURE — 99291 CRITICAL CARE FIRST HOUR: CPT | Mod: GC,,, | Performed by: INTERNAL MEDICINE

## 2023-09-11 PROCEDURE — 99232 PR SUBSEQUENT HOSPITAL CARE,LEVL II: ICD-10-PCS | Mod: 25,,,

## 2023-09-11 PROCEDURE — 80053 COMPREHEN METABOLIC PANEL: CPT

## 2023-09-11 PROCEDURE — 43235 EGD DIAGNOSTIC BRUSH WASH: CPT | Performed by: INTERNAL MEDICINE

## 2023-09-11 PROCEDURE — 85610 PROTHROMBIN TIME: CPT

## 2023-09-11 PROCEDURE — 63600175 PHARM REV CODE 636 W HCPCS

## 2023-09-11 PROCEDURE — 99291 PR CRITICAL CARE, E/M 30-74 MINUTES: ICD-10-PCS | Mod: GC,,, | Performed by: INTERNAL MEDICINE

## 2023-09-11 PROCEDURE — 94761 N-INVAS EAR/PLS OXIMETRY MLT: CPT

## 2023-09-11 PROCEDURE — 85025 COMPLETE CBC W/AUTO DIFF WBC: CPT | Mod: 91 | Performed by: INTERNAL MEDICINE

## 2023-09-11 PROCEDURE — 84100 ASSAY OF PHOSPHORUS: CPT

## 2023-09-11 PROCEDURE — 43235 PR EGD, FLEX, DIAGNOSTIC: ICD-10-PCS | Mod: ,,, | Performed by: INTERNAL MEDICINE

## 2023-09-11 PROCEDURE — 25000003 PHARM REV CODE 250: Performed by: STUDENT IN AN ORGANIZED HEALTH CARE EDUCATION/TRAINING PROGRAM

## 2023-09-11 PROCEDURE — D9220A PRA ANESTHESIA: Mod: CRNA,,, | Performed by: NURSE ANESTHETIST, CERTIFIED REGISTERED

## 2023-09-11 PROCEDURE — 63600175 PHARM REV CODE 636 W HCPCS: Performed by: NURSE ANESTHETIST, CERTIFIED REGISTERED

## 2023-09-11 PROCEDURE — D9220A PRA ANESTHESIA: ICD-10-PCS | Mod: ANES,,, | Performed by: ANESTHESIOLOGY

## 2023-09-11 PROCEDURE — 25000003 PHARM REV CODE 250: Mod: UD | Performed by: NURSE ANESTHETIST, CERTIFIED REGISTERED

## 2023-09-11 PROCEDURE — 83735 ASSAY OF MAGNESIUM: CPT

## 2023-09-11 PROCEDURE — D9220A PRA ANESTHESIA: Mod: ANES,,, | Performed by: ANESTHESIOLOGY

## 2023-09-11 PROCEDURE — 43235 EGD DIAGNOSTIC BRUSH WASH: CPT | Mod: ,,, | Performed by: INTERNAL MEDICINE

## 2023-09-11 RX ORDER — FENTANYL CITRATE 50 UG/ML
INJECTION, SOLUTION INTRAMUSCULAR; INTRAVENOUS
Status: DISCONTINUED | OUTPATIENT
Start: 2023-09-11 | End: 2023-09-11

## 2023-09-11 RX ORDER — METOPROLOL SUCCINATE 100 MG/1
100 TABLET, EXTENDED RELEASE ORAL DAILY
Status: DISCONTINUED | OUTPATIENT
Start: 2023-09-12 | End: 2023-09-14 | Stop reason: HOSPADM

## 2023-09-11 RX ORDER — MUPIROCIN 20 MG/G
OINTMENT TOPICAL 2 TIMES DAILY
Status: DISCONTINUED | OUTPATIENT
Start: 2023-09-11 | End: 2023-09-14 | Stop reason: HOSPADM

## 2023-09-11 RX ORDER — PROPOFOL 10 MG/ML
VIAL (ML) INTRAVENOUS CONTINUOUS PRN
Status: DISCONTINUED | OUTPATIENT
Start: 2023-09-11 | End: 2023-09-11

## 2023-09-11 RX ORDER — SODIUM,POTASSIUM PHOSPHATES 280-250MG
2 POWDER IN PACKET (EA) ORAL ONCE
Status: DISCONTINUED | OUTPATIENT
Start: 2023-09-11 | End: 2023-09-11

## 2023-09-11 RX ORDER — SODIUM CHLORIDE 0.9 % (FLUSH) 0.9 %
3 SYRINGE (ML) INJECTION EVERY 6 HOURS
Status: DISCONTINUED | OUTPATIENT
Start: 2023-09-11 | End: 2023-09-14 | Stop reason: HOSPADM

## 2023-09-11 RX ORDER — WARFARIN 7.5 MG/1
7.5 TABLET ORAL DAILY
Status: DISCONTINUED | OUTPATIENT
Start: 2023-09-11 | End: 2023-09-14 | Stop reason: HOSPADM

## 2023-09-11 RX ORDER — PROCHLORPERAZINE EDISYLATE 5 MG/ML
5 INJECTION INTRAMUSCULAR; INTRAVENOUS EVERY 30 MIN PRN
Status: CANCELLED | OUTPATIENT
Start: 2023-09-11

## 2023-09-11 RX ORDER — SODIUM,POTASSIUM PHOSPHATES 280-250MG
2 POWDER IN PACKET (EA) ORAL ONCE
Status: COMPLETED | OUTPATIENT
Start: 2023-09-11 | End: 2023-09-11

## 2023-09-11 RX ORDER — PROPOFOL 10 MG/ML
VIAL (ML) INTRAVENOUS
Status: DISCONTINUED | OUTPATIENT
Start: 2023-09-11 | End: 2023-09-11

## 2023-09-11 RX ORDER — ONDANSETRON 2 MG/ML
4 INJECTION INTRAMUSCULAR; INTRAVENOUS DAILY PRN
Status: CANCELLED | OUTPATIENT
Start: 2023-09-11

## 2023-09-11 RX ORDER — PHENYLEPHRINE HYDROCHLORIDE 10 MG/ML
INJECTION INTRAVENOUS
Status: DISCONTINUED | OUTPATIENT
Start: 2023-09-11 | End: 2023-09-11

## 2023-09-11 RX ADMIN — PANTOPRAZOLE SODIUM 40 MG: 40 INJECTION, POWDER, FOR SOLUTION INTRAVENOUS at 08:09

## 2023-09-11 RX ADMIN — CEFADROXIL 1 G: 500 CAPSULE ORAL at 08:09

## 2023-09-11 RX ADMIN — PROPOFOL 20 MG: 10 INJECTION, EMULSION INTRAVENOUS at 02:09

## 2023-09-11 RX ADMIN — AMIODARONE HYDROCHLORIDE 0.5 MG/MIN: 1.8 INJECTION, SOLUTION INTRAVENOUS at 07:09

## 2023-09-11 RX ADMIN — PHENYLEPHRINE HYDROCHLORIDE 100 MCG: 10 INJECTION INTRAVENOUS at 02:09

## 2023-09-11 RX ADMIN — MUPIROCIN: 20 OINTMENT TOPICAL at 08:09

## 2023-09-11 RX ADMIN — PROPOFOL 50 MG: 10 INJECTION, EMULSION INTRAVENOUS at 02:09

## 2023-09-11 RX ADMIN — ESCITALOPRAM OXALATE 10 MG: 5 TABLET, FILM COATED ORAL at 08:09

## 2023-09-11 RX ADMIN — POTASSIUM BICARBONATE 40 MEQ: 391 TABLET, EFFERVESCENT ORAL at 10:09

## 2023-09-11 RX ADMIN — SODIUM CHLORIDE: 0.9 INJECTION, SOLUTION INTRAVENOUS at 02:09

## 2023-09-11 RX ADMIN — Medication 3 ML: at 11:09

## 2023-09-11 RX ADMIN — PROPOFOL 150 MCG/KG/MIN: 10 INJECTION, EMULSION INTRAVENOUS at 02:09

## 2023-09-11 RX ADMIN — POTASSIUM & SODIUM PHOSPHATES POWDER PACK 280-160-250 MG 2 PACKET: 280-160-250 PACK at 05:09

## 2023-09-11 RX ADMIN — FENTANYL CITRATE 50 MCG: 50 INJECTION, SOLUTION INTRAMUSCULAR; INTRAVENOUS at 02:09

## 2023-09-11 RX ADMIN — Medication 3 ML: at 04:09

## 2023-09-11 RX ADMIN — POTASSIUM BICARBONATE 20 MEQ: 391 TABLET, EFFERVESCENT ORAL at 04:09

## 2023-09-11 RX ADMIN — POTASSIUM BICARBONATE 40 MEQ: 391 TABLET, EFFERVESCENT ORAL at 05:09

## 2023-09-11 RX ADMIN — WARFARIN SODIUM 7.5 MG: 7.5 TABLET ORAL at 05:09

## 2023-09-11 NOTE — PLAN OF CARE
[facility]  Initial Discharge Assessment       Primary Care Provider: Anjana Torres NP    Admission Diagnosis: Palpitations [R00.2]  History of mechanical aortic valve replacement [Z95.2]  Atrial fibrillation with RVR [I48.91]  Aneurysm of ascending aorta without rupture [I71.21]    Admission Date: 9/9/2023  Expected Discharge Date:          Payor: HUMANA MANAGED MEDICARE / Plan: HU[employer]NA SNP HMO PPO SPECIAL NEEDS / Product Type: Medicare Advantage /     Extended Emergency Contact Information  Primary Emergency Contact: Lorena Wilson  Mobile Phone: [phone]  Relation: Daughter  Secondary Emergency Contact: Isa Delgado  Mobile Phone: [phone]  Relation: Sister    Discharge Plan A: Home with family  Discharge Plan B: [facility] [place] - [address] [place]  [address] [place]  [place] [address]  Phone: [phone] Fax: [phone]      Initial Assessment (most recent)       Adult Discharge Assessment - 09/11/23 1243          Discharge Assessment    Assessment Type Discharge Planning Assessment     Confirmed/corrected address, phone number and insurance Yes     Confirmed Demographics Correct on Facesheet     Source of Information patient     Communicated OFELIA with patient/caregiver Date not available/Unable to determine     Reason For Admission Atrial fibrillation with RVR     People in Home alone     Do you expect to return to your current living situation? No   The patient will be going to her friend's home upon d/c Noe Chin.    Do you have help at home or someone to help you manage your care at home? Yes     Who are your caregiver(s) and their phone number(s)? Noe Chin     Prior to hospitilization cognitive status: Alert/Oriented     Current cognitive status: Alert/Oriented     Equipment Currently Used at Home none     Readmission within 30 days? No     Patient currently being followed by outpatient case management? No     Do you  currently have service(s) that help you manage your care at home? No     Do you take prescription medications? Yes     Do you have prescription coverage? Yes     Coverage HUMANA MANAGED MEDICARE - HU[employer]NA [facility] HMO PPO SPECIAL NEEDS     Is the patient taking medications as prescribed? yes     Who is going to help you get home at discharge? Amy Leger     Are you on dialysis? No     Do you take coumadin? Yes     Who monitors your labs? The patient's Cardiologist montiors her Coumadin.     DME Needed Upon Discharge  other (see comments)   TBD    Discharge Plan discussed with: Patient     Discharge Plan A Home with family     Discharge Plan B Home Health        Physical Activity    On average, how many days per week do you engage in moderate to strenuous exercise (like a brisk walk)? 7 days     On average, how many minutes do you engage in exercise at this level? Patient refused   The patient reported that she gets alot of physical exercise daily.       Financial Resource Strain    How hard is it for you to pay for the very basics like food, housing, medical care, and heating? Patient refused        Housing Stability    In the last 12 months, was there a time when you were not able to pay the mortgage or rent on time? No     In the last 12 months, was there a time when you did not have a steady place to sleep or slept in a shelter (including now)? No        Transportation Needs    In the past 12 months, has lack of transportation kept you from medical appointments or from getting medications? No     In the past 12 months, has lack of transportation kept you from meetings, work, or from getting things needed for daily living? No        Food Insecurity    Within the past 12 months, you worried that your food would run out before you got the money to buy more. Never true     Within the past 12 months, the food you bought just didn't last and you didn't have money to get more. Never true        Stress    Do you feel stress  - tense, restless, nervous, or anxious, or unable to sleep at night because your mind is troubled all the time - these days? Patient refused        Social Connections    In a typical week, how many times do you talk on the phone with family, friends, or neighbors? More than three times a week     How often do you get together with friends or relatives? More than three times a week     How often do you attend Hinduism or Buddhist services? Patient refused     Do you belong to any clubs or organizations such as Hinduism groups, unions, [employer] or athletic groups, or school groups? Patient refused     How often do you attend meetings of the clubs or organizations you belong to? Patient refused     Are you , , , , never , or living with a partner? Patient refused        Alcohol Use    Q1: How often do you have a drink containing alcohol? Patient refused     Q2: How many drinks containing alcohol do you have on a typical day when you are drinking? Patient refused     Q3: How often do you have six or more drinks on one occasion? Patient refused                        This SW met with patient and her friend at bedside to complete DPA. Questions answered / contact numbers provided.  Use PREFERRED PHARMACY / BEDSIDE DELIVERY for any necessary medications at time of discharge. The patient is independent with all ADLs - does not use DME, In-home equipment, is not on HD or home oxygen. The patient takes Coumadin and it is managed by her Cardiologist( Dr. Yip in [place]).  The patient's friend will be assisting with help upon discharge. The patient's friend  will be providing transportation home. Hospital follow up will be scheduled with PCP. Will continue to follow for course of hospitalization.     Patrick Paulino LMSW  Case Management [facility]

## 2023-09-11 NOTE — RESIDENT HANDOFF
Handoff     Primary Team: Networked reference to record [facility]  Room Number: 6069/6069 A     Patient Name: Naina Keyes MRN: [id number]     Date of Birth: [id number] Allergies: Iodine and Lidocaine     Age: 62 y.o. Admit Date: 9/9/2023     Sex: female  BMI: Body mass index is 26.57 kg/m².     Code Status: Full Code        Illness Level (current clinical status): Watcher - No    Reason for Admission: Atrial fibrillation with RVR    Brief HPI (pertinent PMH and diagnosis or differential diagnosis): 62 year old female with extensive cardiac history (TAA, mechanical AVR, VSD surgical patch, pAF, pacemaker, HTN) admitted to MICU for afib with RVR requiring diltiazem drip for rate control. Diltiazem switched to amiodarone drip. Concern for melenotic stools; EGD performed today and GI signed off on patient. Restarting warfarin 7.5 mg and switching to metoprolol PO per cardiology recommendations.    Procedure Date: EGD 9/11/23    Hospital Course (updated, brief assessment by system or problem, significant events): Patient initially presented to the ED complaining of intermittent palpitations x 1 week. During the episodes of palpitations, she also experienced concomitant SOB, nausea, fatigue, and generalized weakness. She is on chronic anticoagulation and chronic antibiotics, denies missing any doses. Patient admitted to MICU for afib with RVR requiring diltiazem drip. Warfarin discontinued on 9/10/23 and patient switched to heparin in preparation for EGD, with  resumption of warfarin after procedure. Patient off diltiazem in ED and switched to amiodarone infusion with plans for PO metoprolol monotherapy after completion of EGD and with stable pressures.    Tasks (specific, using if-then statements):     Contingency Plan (special circumstances anticipated and plan): If patient goes back into afib with RVR despite metoprolol PO, trial amiodarone.    Estimated Discharge Date: 9/12/23    Discharge Disposition: Admitted as an  Inpatient    Mentored By:

## 2023-09-11 NOTE — TRANSFER OF CARE
"Anesthesia Transfer of Care Note    Patient: Naina Keyes    Procedure(s) Performed: Procedure(s) (LRB):  EGD (ESOPHAGOGASTRODUODENOSCOPY) (N/A)    Patient location: ICU    Anesthesia Type: general    Transport from OR: Transported from OR on room air with adequate spontaneous ventilation. Continuous ECG monitoring in transport. Continuous SpO2 monitoring in transport    Post pain: adequate analgesia    Post assessment: no apparent anesthetic complications    Post vital signs: stable    Level of consciousness: awake and alert    Nausea/Vomiting: no nausea/vomiting    Complications: none    Transfer of care protocol was followed      Last vitals:   Visit Vitals  BP (!) 117/55 (BP Location: Right arm, Patient Position: Lying)   Pulse 69   Temp 36.4 °C (97.5 °F) (Temporal)   Resp 16   Ht 5' 3" (1.6 m)   Wt 68 kg (150 lb)   SpO2 97%   Breastfeeding No   BMI 26.57 kg/m²     "

## 2023-09-11 NOTE — TREATMENT PLAN
GI Post-Procedure Treatment Plan    EGD complete    Impression:            - Normal esophagus.                          - Erosive gastropathy with no bleeding and no                          stigmata of recent bleeding.                          - Normal examined duodenum.                          - No specimens collected.     Recommendation:   - Ok to discharge home from GI perspective  - Resume previous diet.   - Continue present medications.   - GI will sign off.    Julio Suresh  Gastroenterology Fellow, PGY-IV

## 2023-09-11 NOTE — SUBJECTIVE & OBJECTIVE
Subjective:     Interval History: Followed up with patient for possible EGD pending improvement in INR. Noted to be 2.3 this AM. Currently hemodynamically stable. Continued on Amiodarone infusion for A. Fib now in NSR. Afebrile. NPO after midnight.     Review of Systems   Constitutional:  Positive for activity change. Negative for appetite change and fatigue.   HENT:  Negative for sore throat and trouble swallowing.    Eyes:  Negative for redness.   Gastrointestinal:  Positive for blood in stool. Negative for abdominal distention, abdominal pain, anal bleeding, constipation, diarrhea, nausea, rectal pain and vomiting.   Neurological:  Negative for dizziness, syncope and weakness.     Objective:     Vital Signs (Most Recent):  Temp: 98.3 °F (36.8 °C) (09/11/23 0705)  Pulse: 65 (09/11/23 0800)  Resp: 19 (09/11/23 0800)  BP: (!) 97/57 (09/11/23 0800)  SpO2: 97 % (09/11/23 0800) Vital Signs (24h Range):  Temp:  [97.8 °F (36.6 °C)-98.3 °F (36.8 °C)] 98.3 °F (36.8 °C)  Pulse:  [] 65  Resp:  [15-41] 19  SpO2:  [92 %-99 %] 97 %  BP: ()/(51-78) 97/57     Weight: 68 kg (150 lb) (09/09/23 1034)  Body mass index is 26.57 kg/m².      Intake/Output Summary (Last 24 hours) at 9/11/2023 0924  Last data filed at 9/11/2023 0800  Gross per 24 hour   Intake 1165.05 ml   Output 3875 ml   Net -2709.95 ml       Lines/Drains/Airways       Peripheral Intravenous Line  Duration                  Peripheral IV - Single Lumen 09/09/23 1913 20 G Distal;Left;Posterior Forearm 1 day         Peripheral IV - Single Lumen 09/09/23 2300 18 G Left Antecubital 1 day                     Physical Exam  Vitals and nursing note reviewed.   Constitutional:       Appearance: She is normal weight. She is not ill-appearing.   HENT:      Mouth/Throat:      Mouth: Mucous membranes are moist.      Pharynx: Oropharynx is clear.   Eyes:      General: No scleral icterus.  Abdominal:      General: Abdomen is flat. Bowel sounds are normal. There is no  distension.      Palpations: Abdomen is soft. There is no mass.      Tenderness: There is no abdominal tenderness.      Hernia: No hernia is present.   Skin:     General: Skin is warm and dry.      Capillary Refill: Capillary refill takes less than 2 seconds.      Coloration: Skin is not jaundiced or pale.      Findings: No bruising or erythema.   Neurological:      Mental Status: She is alert and oriented to person, place, and time. Mental status is at baseline.          Significant Labs:  CBC:   Recent Labs   Lab 09/10/23  0943 09/10/23  1715 09/10/23  2357   WBC 9.27 9.31 8.47   HGB 11.8* 12.1 11.1*   HCT 36.7* 37.5 33.7*    213 207     CMP:   Recent Labs   Lab 09/11/23  0348   *   CALCIUM 8.9   ALBUMIN 3.6   PROT 6.3      K 3.1*   CO2 26      BUN 20   CREATININE 1.0   ALKPHOS 93   ALT 27   AST 28   BILITOT 1.9*     Coagulation:   Recent Labs   Lab 09/11/23  0348   INR 2.3*         Significant Imaging:  Imaging results within the past 24 hours have been reviewed.

## 2023-09-11 NOTE — ASSESSMENT & PLAN NOTE
Patient with extensive cardiac history presented to the ED c/o one-week history of intermittent palpitations associated with SOB, nausea, fatigue, and generalized weakness. In the ED, EKG with a-fib with RVR. She was given metoprolol 5 mg IV x 3 and diltiazem 10 mg IV x 1 with no change in her heart rhythm. She is being admitted to the ICU for titrating diltiazem drip.     Decompensation: Etiology may be 2/2 hypervolemia s/o ADHF  Current rhythm: AF with RVR  Home meds: States previous history of AF RVR (documented history of metoprolol and sotalol, was supposed to follow up with cardiologist OP for regimen clarification)    -- Rate control: diltiazem gtt with goal HR < 110; titrate off and substitute with amiodarone   - transitioned to amiodarone infusion. Now in NSR. Discussed with Cardiology, appreciate recs    - cardiology recommends PO metoprolol; now that EGD shows no evidence of GI bleeding, will switch to PO  -- Patient anticoagulated with warfarin for mechanical aortic valve   - held for endoscopy; restarting  -- Cardiac telemetry  -- Maintain K > 4, Mg > 2, Ca wnl; replete PRN  -- STAT cardiology consult if hemodynamic instability for DCCV evaluation

## 2023-09-11 NOTE — PROGRESS NOTES
[facility] Medical [facility]  Gastroenterology  Progress Note    Patient Name: Naina Keyes  MRN: [id number]  Admission Date: 9/9/2023  Hospital Length of Stay: 2 days  Code Status: Full Code   Attending Provider: Raul Caro MD  Consulting Provider: Tonya Delgado NP  Primary Care Physician: Anjana Torres NP  Principal Problem: Atrial fibrillation with RVR    Subjective:     Interval History: Followed up with patient for possible EGD pending improvement in INR. Noted to be 2.3 this AM. Currently hemodynamically stable. Continued on Amiodarone infusion for A. Fib now in NSR. Afebrile. NPO after midnight.     Review of Systems   Constitutional:  Positive for activity change. Negative for appetite change and fatigue.   HENT:  Negative for sore throat and trouble swallowing.    Eyes:  Negative for redness.   Gastrointestinal:  Positive for blood in stool. Negative for abdominal distention, abdominal pain, anal bleeding, constipation, diarrhea, nausea, rectal pain and vomiting.   Neurological:  Negative for dizziness, syncope and weakness.     Objective:     Vital Signs (Most Recent):  Temp: 98.3 °F (36.8 °C) (09/11/23 0705)  Pulse: 65 (09/11/23 0800)  Resp: 19 (09/11/23 0800)  BP: (!) 97/57 (09/11/23 0800)  SpO2: 97 % (09/11/23 0800) Vital Signs (24h Range):  Temp:  [97.8 °F (36.6 °C)-98.3 °F (36.8 °C)] 98.3 °F (36.8 °C)  Pulse:  [] 65  Resp:  [15-41] 19  SpO2:  [92 %-99 %] 97 %  BP: ()/(51-78) 97/57     Weight: 68 kg (150 lb) (09/09/23 1034)  Body mass index is 26.57 kg/m².      Intake/Output Summary (Last 24 hours) at 9/11/2023 0924  Last data filed at 9/11/2023 0800  Gross per 24 hour   Intake 1165.05 ml   Output 3875 ml   Net -2709.95 ml       Lines/Drains/Airways       Peripheral Intravenous Line  Duration                  Peripheral IV - Single Lumen 09/09/23 1913 20 G Distal;Left;Posterior Forearm 1 day         Peripheral IV - Single Lumen 09/09/23 2300 18 G Left Antecubital 1 day                      Physical Exam  Vitals and nursing note reviewed.   Constitutional:       Appearance: She is normal weight. She is not ill-appearing.   HENT:      Mouth/Throat:      Mouth: Mucous membranes are moist.      Pharynx: Oropharynx is clear.   Eyes:      General: No scleral icterus.  Abdominal:      General: Abdomen is flat. Bowel sounds are normal. There is no distension.      Palpations: Abdomen is soft. There is no mass.      Tenderness: There is no abdominal tenderness.      Hernia: No hernia is present.   Skin:     General: Skin is warm and dry.      Capillary Refill: Capillary refill takes less than 2 seconds.      Coloration: Skin is not jaundiced or pale.      Findings: No bruising or erythema.   Neurological:      Mental Status: She is alert and oriented to person, place, and time. Mental status is at baseline.          Significant Labs:  CBC:   Recent Labs   Lab 09/10/23  0943 09/10/23  1715 09/10/23  2357   WBC 9.27 9.31 8.47   HGB 11.8* 12.1 11.1*   HCT 36.7* 37.5 33.7*    213 207     CMP:   Recent Labs   Lab 09/11/23  0348   *   CALCIUM 8.9   ALBUMIN 3.6   PROT 6.3      K 3.1*   CO2 26      BUN 20   CREATININE 1.0   ALKPHOS 93   ALT 27   AST 28   BILITOT 1.9*     Coagulation:   Recent Labs   Lab 09/11/23  0348   INR 2.3*         Significant Imaging:  Imaging results within the past 24 hours have been reviewed.    Assessment/Plan:     GI  Gastrointestinal hemorrhage with melena  62 year old female with mechanical AVR 2/2 bacterial endocarditis (on coumadin, INR 2.5-3.5), TAA repair with Dacron graft, VSD surgical patch repair, s/p pacemaker, colon polyps, pAF, and HTN admitted to the MICU on 9/10 with A-fib RVR. GI consulted for three reports of melena on 9/9 in the setting of stable anemia. Melena noted on rectal exam.    Recommendations:  - Will proceed with EGD Today given improvement in INR.  - Trend Hgb q12hrs. Transfuse for Hgb > 7, unless otherwise  indicated  - Bolus IV pantoprazole 80mg followed by 40mg BID  - Maintain IV access with 2 large bore Ivs  - Intravascular resuscitation/support with IVFs   - Hold all NSAIDs and anticoagulants, unless contraindicated  - Please correct any coagulopathy with platelets and FFP for goal of platelets >50K and INR <2.0  - Please notify GI team if there is significant change in patient's clinical status          Thank you for your consult. I will follow-up with patient. Please contact us if you have any additional questions.      Tonya Delgado NP  Gastroenterology  [facility] - [facility]

## 2023-09-11 NOTE — PLAN OF CARE
MICU DAILY GOALS     Family/Goals of care/Code Status   Code Status: Full Code    24H Vital Sign Range  Temp:  [97.5 °F (36.4 °C)-98.3 °F (36.8 °C)]   Pulse:  []   Resp:  [15-36]   BP: ()/(51-71)   SpO2:  [93 %-99 %]      Shift Events   No acute events throughout shift. Amio d/c today; restarted pt coumadin and metoprolol. EGD performed today was negative. Active step down orders present    AWAKE RASS: Goal - RASS Goal: 0-->alert and calm  Actual - RASS (Meyers Agitation-Sedation Scale): alert and calm    Restraint necessity: Not necessary   BREATHE SBT: Not intubated    Coordinate A & B, analgesics/sedatives Pain: managed   SAT: Not intubated   Delirium CAM-ICU: Overall CAM-ICU: Negative   Early(intubated/ Progressive (non-intubated) Mobility MOVE Screen: Pass   Activity: Activity Management: Ambulated to bathroom - L4   Feeding/Nutrition Diet order: Diet/Nutrition Received: NPO,     Thrombus DVT prophylaxis: VTE Required Core Measure: Pharmacological prophylaxis initiated/maintained   HOB Elevation Head of Bed (HOB) Positioning: HOB at 30-45 degrees   Ulcer Prophylaxis GI: yes   Glucose control managed     Skin Skin assessed during daily assessment. LDA present.   Bowel Function no issues    Indwelling Catheter Necessity              De-escalation Antibiotics Yes       VS and assessment per flow sheet, patient progressing towards goals as tolerated, plan of care reviewed with family, all concerns addressed, will continue to monitor.

## 2023-09-11 NOTE — ANESTHESIA PREPROCEDURE EVALUATION
09/11/2023  Naina Keyes is a 62 y.o., female.    Procedure: EGD (ESOPHAGOGASTRODUODENOSCOPY) (Abdomen)   Anesthesia type: General/MAC   Diagnosis: Gastrointestinal hemorrhage with melena [K92.1]         Pre-operative evaluation for Procedure(s) (LRB):  EGD (ESOPHAGOGASTRODUODENOSCOPY) (N/A)    @[id number]    Encounter Diagnoses   Name Primary?    Palpitations     Atrial fibrillation with RVR Yes    Aneurysm of ascending aorta without rupture     History of mechanical aortic valve replacement     Gastrointestinal hemorrhage with melena        Review of patient's allergies indicates:   Allergen Reactions    Iodine Anaphylaxis     contrast    Lidocaine Anaphylaxis     cetacaine spray       Medications Prior to Admission   Medication Sig Dispense Refill Last Dose    acetaminophen (TYLENOL) 500 MG tablet Take 1,000 mg by mouth daily as needed (pain/fever).       ALBUTEROL SULFATE (VENTOLIN HFA INHL) Inhale 1 puff into the lungs as needed (wheezing/shortness of breath).       alprazolam (XANAX) 0.25 MG tablet Take 0.25 mg by mouth 2 (two) times daily as needed for Anxiety.       amLODIPine (NORVASC) 5 MG tablet TAKE ONE TABLET BY MOUTH once a day AT NIGHT       cefadroxil (DURICEF) 500 MG Cap Take 2 capsules (1 g total) by mouth every 12 (twelve) hours. 120 capsule 11     EScitalopram oxalate (LEXAPRO) 10 MG tablet Take by mouth.       loratadine (CLARITIN) 10 mg tablet TAKE ONE TABLET BY MOUTH DAILY AS NEEDED FOR ALLERGIES       metoprolol succinate (TOPROL-XL) 50 MG 24 hr tablet Take 1 tablet every Morning and take 2 tablets in the evening       ofloxacin (FLOXIN) 0.3 % otic solution 5 drops once daily.       potassium chloride (MICRO-K) 10 MEQ CpSR Take 10 mEq by mouth 2 (two) times daily.        prednisoLONE acetate (PRED FORTE) 1 % DrpS Place 1 drop into the right eye 4 (four) times  daily.       sotaloL (BETAPACE) 80 MG tablet Take 1 tablet (80 mg total) by mouth 2 (two) times daily. Please HOLD taking Sotalol until follow up with cardiologist outpatient. 30 tablet 0     valACYclovir (VALTREX) 1000 MG tablet Take 1 tablet (1,000 mg total) by mouth 2 (two) times daily. for 5 days 10 tablet 0     valsartan (DIOVAN) 320 MG tablet Take 160 mg by mouth every 12 (twelve) hours. 1/2 in the morning 1/2 at night       warfarin (COUMADIN) 5 MG tablet On 5/23-5/24, take 1.5 tablets (7.5 mg) by mouth daily. Starting 5/25, take 1 tablet (5 mg) by mouth daily or as directed by coumadin clinic       zolpidem (AMBIEN) 10 mg Tab Take 10 mg by mouth every evening.            amiodarone in dextrose 5% 0.5 mg/min (09/11/23 1300)       No current facility-administered medications on file prior to encounter.     Current Outpatient Medications on File Prior to Encounter   Medication Sig Dispense Refill    acetaminophen (TYLENOL) 500 MG tablet Take 1,000 mg by mouth daily as needed (pain/fever).      ALBUTEROL SULFATE (VENTOLIN HFA INHL) Inhale 1 puff into the lungs as needed (wheezing/shortness of breath).      alprazolam (XANAX) 0.25 MG tablet Take 0.25 mg by mouth 2 (two) times daily as needed for Anxiety.      amLODIPine (NORVASC) 5 MG tablet TAKE ONE TABLET BY MOUTH once a day AT NIGHT      cefadroxil (DURICEF) 500 MG Cap Take 2 capsules (1 g total) by mouth every 12 (twelve) hours. 120 capsule 11    EScitalopram oxalate (LEXAPRO) 10 MG tablet Take by mouth.      loratadine (CLARITIN) 10 mg tablet TAKE ONE TABLET BY MOUTH DAILY AS NEEDED FOR ALLERGIES      metoprolol succinate (TOPROL-XL) 50 MG 24 hr tablet Take 1 tablet every Morning and take 2 tablets in the evening      ofloxacin (FLOXIN) 0.3 % otic solution 5 drops once daily.      potassium chloride (MICRO-K) 10 MEQ CpSR Take 10 mEq by mouth 2 (two) times daily.       prednisoLONE acetate (PRED FORTE) 1 % DrpS Place 1 drop into the right eye  "4 (four) times daily.      sotaloL (BETAPACE) 80 MG tablet Take 1 tablet (80 mg total) by mouth 2 (two) times daily. Please HOLD taking Sotalol until follow up with cardiologist outpatient. 30 tablet 0    valACYclovir (VALTREX) 1000 MG tablet Take 1 tablet (1,000 mg total) by mouth 2 (two) times daily. for 5 days 10 tablet 0    valsartan (DIOVAN) 320 MG tablet Take 160 mg by mouth every 12 (twelve) hours. 1/2 in the morning 1/2 at night      warfarin (COUMADIN) 5 MG tablet On -, take 1.5 tablets (7.5 mg) by mouth daily. Starting , take 1 tablet (5 mg) by mouth daily or as directed by coumadin clinic      zolpidem (AMBIEN) 10 mg Tab Take 10 mg by mouth every evening.         Past Medical History:  No date: *Atrial fibrillation  No date: Heart disease  No date: Hypertension  2013: Pacemaker  2013: S/P Mechanical AVR  2013: S/P thoracic aortic aneurysm repair with Dacron graft   ()  2013: S/P VSD surgical patch repair -    Past Surgical History:   Procedure Laterality Date    ASCENDING AORTIC ANEURYSM REPAIR      BREAST SURGERY      CARDIAC CATHETERIZATION      CARDIAC PACEMAKER PLACEMENT      HYSTERECTOMY      MECHANICAL AORTIC VALVE REPLACEMENT         Social History     Tobacco Use   Smoking Status Former    Current packs/day: 0.00    Types: Cigarettes    Quit date: 2009    Years since quittin.8   Smokeless Tobacco Never       Social History     Substance and Sexual Activity   Alcohol Use No       Physical Activity: Unknown (2023)    Exercise Vital Sign     Days of Exercise per Week: 7 days     Minutes of Exercise per Session: Patient refused         Recent Labs     23  0837   HCT 39.4     Recent Labs     23  0837        Recent Labs     23  0348   K 3.1*     Recent Labs     23  0348   CREATININE 1.0     Recent Labs     23  0348   *     No results for input(s): "PT" in the last 72 " hours.                      Pre-op Assessment          Review of Systems  Anesthesia Hx:  No problems with previous Anesthesia    Hematology/Oncology:     Oncology Normal   Hematology Comments: Last coumadin for afib was Friday   Cardiovascular:   Hypertension, well controlled Valvular problems/Murmurs (AVR, ) Denies MI.  Dysrhythmias (new onset) atrial fibrillation  Denies Angina.    Pulmonary:  Pulmonary Normal  Denies COPD.  Denies Asthma.  Denies Shortness of breath. Acute infectious bronchitis once a year.   Renal/:   Denies Chronic Renal Disease.     Hepatic/GI:   Denies Liver Disease.    Neurological:   Denies TIA. Denies CVA. Denies Seizures.    Endocrine:   Denies Diabetes.        Physical Exam  General: Well nourished, Cooperative, Alert and Oriented    Airway:  Mallampati: II   Mouth Opening: Normal  TM Distance: Normal  Tongue: Normal  Neck ROM: Normal ROM        Anesthesia Plan  Type of Anesthesia, risks & benefits discussed:    Anesthesia Type: Gen Natural Airway  Intra-op Monitoring Plan: Standard ASA Monitors  Post Op Pain Control Plan: IV/PO Opioids PRN  Induction:  IV  Informed Consent: Informed consent signed with the Patient and all parties understand the risks and agree with anesthesia plan.  All questions answered.   ASA Score: 2 Emergent  Day of Surgery Review of History & Physical: H&P Update referred to the surgeon/provider.    Ready For Surgery From Anesthesia Perspective.     .    Date/Time Temp Temp #2 Pulse Resp BP Patient Position MAP (mmHg) SpO2 Weight Flow (L/min) Oxygen Concentration (%) Device (Oxygen Therapy) Arterial Line BP Arterial Line BP 2 Temp #2 [facility]   09/11/23 1200 -- -- 71 18 107/56 Abnormal  Lying 76 98 % -- -- -- room air -- -- -- ME   09/11/23 1105 36.7 °C (98 °F) -- 66 19 102/55 Abnormal  Lying 74 97 % -- -- -- room air -- -- -- ME   09/11/23 1100 -- -- -- 16 -- -- -- -- -- -- -- -- -- -- -- ME   09/11/23 1018 -- -- -- -- -- -- -- -- 68 kg (150 lb) -- -- -- -- -- --     09/11/23 1000 -- -- 65 20 93/55 Abnormal  Lying 70 96 % -- -- -- room air -- -- -- ME   09/11/23 0900 -- -- 65 15 108/53 Abnormal  Lying 77 97 % -- -- -- room air -- -- -- ME   09/11/23 0800 -- -- 65 19 97/57 Abnormal  Lying 71 97 % -- -- -- room air -- -- -- ME   09/11/23 0725 -- -- 65 17 -- -- -- 96 % -- -- -- room air -- -- --    09/11/23 0705 36.8 °C (98.3 °F) -- 65 20 90/55 Abnormal  Lying 69 95 % -- -- -- room air -- -- -- ME   09/11/23 0600 -- -- 70 18 92/54 Abnormal  Lying 68 95 % -- -- -- room air -- -- --    09/11/23 0500 -- -- 66 18 91/55 Abnormal  Lying 67 96 % -- -- -- room air -- -- --    09/11/23 0400 -- -- 70 27 Abnormal  97/52 Abnormal  Lying 69 94 % Abnormal  -- -- -- room air -- -- --    09/11/23 0330 36.7 °C (98.1 °F) -- 66 23 Abnormal  94/51 Abnormal  -- 67 93 % Abnormal  -- -- -- -- -- -- --    09/11/23 0300 -- -- 65 21 Abnormal  92/55 Abnormal  Lying 68 93 % Abnormal  -- -- -- room air -- -- --    09/11/23 0200 -- -- 65 17 93/58 Abnormal  Lying 71 94 % Abnormal  -- -- -- room air -- -- --    09/11/23 0115 -- -- 63 16 89/55 Abnormal  Lying 68 94 % Abnormal  -- -- -- room air -- -- --    09/11/23 0100 -- -- 65 21 Abnormal  81/53 Abnormal  Lying 63 94 % Abnormal  -- -- -- room air -- -- --    09/11/23 0000 -- -- 67 30 Abnormal  97/53 Abnormal  Lying 70 97 % -- -- -- room air -- -- -- JF

## 2023-09-11 NOTE — PLAN OF CARE
MICU DAILY GOALS     Family/Goals of care/Code Status   Code Status: Full Code    24H Vital Sign Range  Temp:  [97.8 °F (36.6 °C)-98.3 °F (36.8 °C)]   Pulse:  []   Resp:  [15-41]   BP: ()/(51-78)   SpO2:  [92 %-99 %]      Shift Events  Transfer orders in place. Amiodarone gtt continued. Currently in paced NSR.   No complaints of GI symptoms this shift.  Lab values reported to provider. Potassium and Phos replaced per orders.    AWAKE RASS: Goal - RASS Goal: 0-->alert and calm  Actual - RASS (Meyers Agitation-Sedation Scale): alert and calm    Restraint necessity: Not necessary   BREATHE SBT: Not intubated    Coordinate A & B, analgesics/sedatives Pain: managed   SAT: Not intubated   Delirium CAM-ICU: Overall CAM-ICU: Negative   Early(intubated/ Progressive (non-intubated) Mobility MOVE Screen: Pass   Activity: Activity Management: Ambulated to bathroom - L4   Feeding/Nutrition Diet order: Diet/Nutrition Received: NPO,     Thrombus DVT prophylaxis: VTE Required Core Measure: Pharmacological prophylaxis initiated/maintained   HOB Elevation Head of Bed (HOB) Positioning: HOB at 20-30 degrees   Ulcer Prophylaxis GI: yes   Glucose control managed     Skin Skin assessed during daily assessment.    Bowel Function no issues    Indwelling Catheter Necessity            De-escalation Antibiotics N/A       VS and assessment per flow sheet, patient progressing towards goals as tolerated, plan of care reviewed with patient and patient friend, all concerns addressed at this time.    Brenton Hanson RN

## 2023-09-11 NOTE — HOSPITAL COURSE
Patient initially presented to the ED complaining of intermittent palpitations x 1 week. During the episodes of palpitations, she also experienced concomitant SOB, nausea, fatigue, and generalized weakness. She is on chronic anticoagulation and chronic antibiotics, denies missing any doses. Patient admitted to MICU for afib with RVR requiring diltiazem drip. Warfarin discontinued on 9/10/23 and patient switched to heparin in preparation for EGD, with plan for resumption of warfarin after procedure. Patient off diltiazem in ED and switched to amiodarone infusion prior to EGD. After completion of EGD, warfarin resumed with heparin bridge. INR goal 2.5-3.5 per cardiology. Patient started on PO metoprolol, off amiodarone infusion, and stepped down from MICU.

## 2023-09-11 NOTE — ANESTHESIA POSTPROCEDURE EVALUATION
Anesthesia Post Evaluation    Patient: Naina Keyes    Procedure(s) Performed: Procedure(s) (LRB):  EGD (ESOPHAGOGASTRODUODENOSCOPY) (N/A)    Final Anesthesia Type: general      Patient location during evaluation: PACU  Patient participation: Yes- Able to Participate  Level of consciousness: awake and alert and oriented  Post-procedure vital signs: reviewed and stable  Pain management: adequate  Airway patency: patent    PONV status at discharge: No PONV  Anesthetic complications: no      Cardiovascular status: stable  Respiratory status: unassisted, spontaneous ventilation and room air  Hydration status: euvolemic  Follow-up not needed.                                              No case tracking events are documented in the log.      Pain/Darryl Score: Pain Rating Prior to Med Admin: 0 (9/11/2023  1:00 AM)  Pain Rating Post Med Admin: 6 (9/10/2023 12:15 AM)      She was awake enough during transport for the interview.

## 2023-09-11 NOTE — ASSESSMENT & PLAN NOTE
S/P VSD surgical patch repair 1997  S/P thoracic aortic aneurysm repair with Dacron graft 2007    Follows with Dr Colton Gallegos in Interventional Cardiology. Anticoagulated with warfarin.    -- Continue warfarin while inpatient  -- Appreciate PharmD assistance in dosing adjustment while inpatient  -- Daily INR    9/11: Endoscopy complete without evidence of GI bleed; warfarin restarted with heparin bridge. Confirmed INR goal with cardiology 2.5-3.5

## 2023-09-11 NOTE — H&P
Short Stay Endoscopy   Pre-Procedure Note    PCP - Anjana Torres NP  Referring Physician - Bob Morrison MD  [address] [place] [address]    Procedure - Endoscopy    ASA - per anesthesia  Mallampati - per anesthesia    HPI  62 y.o. female scheduled for endoscopy for evaluation of    1. Atrial fibrillation with RVR    2. Palpitations    3. Aneurysm of ascending aorta without rupture    4. History of mechanical aortic valve replacement    5. Gastrointestinal hemorrhage with melena         Medical History:  has a past medical history of *Atrial fibrillation, Heart disease, Hypertension, Pacemaker (11/25/2013), S/P Mechanical AVR (11/25/2013), S/P thoracic aortic aneurysm repair with Dacron graft (2007) (11/25/2013), and S/P VSD surgical patch repair -1997 (11/25/2013).    Surgical History:  has a past surgical history that includes Cardiac pacemaker placement; Mechanical aortic valve replacement; Ascending aortic aneurysm repair; Hysterectomy; Breast surgery; and Cardiac catheterization.    Family History: family history includes Arrhythmia in her mother; Heart disease in her father and mother; Heart failure in her mother; Hypertension in her father and mother..    Social History:  reports that she quit smoking about 13 years ago. Her smoking use included cigarettes. She has never used smokeless tobacco. She reports that she does not drink alcohol and does not use drugs.    Review of patient's allergies indicates:   Allergen Reactions    Iodine Anaphylaxis     contrast    Lidocaine Anaphylaxis     cetacaine spray       Medications:   Medications Prior to Admission   Medication Sig Dispense Refill Last Dose    acetaminophen (TYLENOL) 500 MG tablet Take 1,000 mg by mouth daily as needed (pain/fever).       ALBUTEROL SULFATE (VENTOLIN HFA INHL) Inhale 1 puff into the lungs as needed (wheezing/shortness of breath).       alprazolam (XANAX) 0.25 MG tablet Take 0.25 mg by mouth 2 (two) times daily as  needed for Anxiety.       amLODIPine (NORVASC) 5 MG tablet TAKE ONE TABLET BY MOUTH once a day AT NIGHT       cefadroxil (DURICEF) 500 MG Cap Take 2 capsules (1 g total) by mouth every 12 (twelve) hours. 120 capsule 11     EScitalopram oxalate (LEXAPRO) 10 MG tablet Take by mouth.       loratadine (CLARITIN) 10 mg tablet TAKE ONE TABLET BY MOUTH DAILY AS NEEDED FOR ALLERGIES       metoprolol succinate (TOPROL-XL) 50 MG 24 hr tablet Take 1 tablet every Morning and take 2 tablets in the evening       ofloxacin (FLOXIN) 0.3 % otic solution 5 drops once daily.       potassium chloride (MICRO-K) 10 MEQ CpSR Take 10 mEq by mouth 2 (two) times daily.        prednisoLONE acetate (PRED FORTE) 1 % DrpS Place 1 drop into the right eye 4 (four) times daily.       sotaloL (BETAPACE) 80 MG tablet Take 1 tablet (80 mg total) by mouth 2 (two) times daily. Please HOLD taking Sotalol until follow up with cardiologist outpatient. 30 tablet 0     valACYclovir (VALTREX) 1000 MG tablet Take 1 tablet (1,000 mg total) by mouth 2 (two) times daily. for 5 days 10 tablet 0     valsartan (DIOVAN) 320 MG tablet Take 160 mg by mouth every 12 (twelve) hours. 1/2 in the morning 1/2 at night       warfarin (COUMADIN) 5 MG tablet On 5/23-5/24, take 1.5 tablets (7.5 mg) by mouth daily. Starting 5/25, take 1 tablet (5 mg) by mouth daily or as directed by coumadin clinic       zolpidem (AMBIEN) 10 mg Tab Take 10 mg by mouth every evening.            Labs:  Lab Results   Component Value Date    WBC 7.84 09/11/2023    HGB 12.5 09/11/2023    HCT 39.4 09/11/2023     09/11/2023    ALT 27 09/11/2023    AST 28 09/11/2023     09/11/2023    K 3.1 (L) 09/11/2023     09/11/2023    CREATININE 1.0 09/11/2023    BUN 20 09/11/2023    CO2 26 09/11/2023    TSH 1.024 09/09/2023    INR 2.3 (H) 09/11/2023       Risks and benefits of this endoscopic procedure, including but not limited to bleeding, inflammation, infection, perforation, and death,  explained to the patient prior to procedure      Stefan Mendes MD

## 2023-09-11 NOTE — SUBJECTIVE & OBJECTIVE
Interval History/Significant Events: No acute overnight events    Review of Systems   Constitutional:  Negative for activity change and appetite change.   HENT:  Negative for congestion, ear pain and hearing loss.    Eyes:  Negative for discharge and redness.   Respiratory:  Positive for cough. Negative for shortness of breath and wheezing.    Cardiovascular:  Negative for chest pain, palpitations and leg swelling.   Gastrointestinal:  Negative for blood in stool and constipation.   Endocrine: Negative for polydipsia, polyphagia and polyuria.   Genitourinary:  Negative for difficulty urinating and hematuria.   Skin:  Negative for pallor, rash and wound.   Allergic/Immunologic: Negative for food allergies and immunocompromised state.   Neurological:  Negative for seizures and facial asymmetry.   Hematological:  Negative for adenopathy. Does not bruise/bleed easily.   Psychiatric/Behavioral:  Negative for agitation and confusion.    All other systems reviewed and are negative.    Objective:     Vital Signs (Most Recent):  Temp: 98 °F (36.7 °C) (09/11/23 1105)  Pulse: 71 (09/11/23 1200)  Resp: 18 (09/11/23 1200)  BP: (!) 107/56 (09/11/23 1200)  SpO2: 98 % (09/11/23 1200) Vital Signs (24h Range):  Temp:  [97.8 °F (36.6 °C)-98.3 °F (36.8 °C)] 98 °F (36.7 °C)  Pulse:  [] 71  Resp:  [15-36] 18  SpO2:  [92 %-99 %] 98 %  BP: ()/(51-77) 107/56   Weight: 68 kg (150 lb)  Body mass index is 26.57 kg/m².      Intake/Output Summary (Last 24 hours) at 9/11/2023 1330  Last data filed at 9/11/2023 1300  Gross per 24 hour   Intake 779.52 ml   Output 2550 ml   Net -1770.48 ml          Physical Exam  Vitals and nursing note reviewed.   Constitutional:       Appearance: Normal appearance.   HENT:      Head: Normocephalic and atraumatic.   Cardiovascular:      Rate and Rhythm: Normal rate and regular rhythm.   Pulmonary:      Effort: Pulmonary effort is normal.      Breath sounds: Normal breath sounds.   Abdominal:       General: There is no distension.      Palpations: Abdomen is soft.   Skin:     General: Skin is warm and dry.   Neurological:      General: No focal deficit present.      Mental Status: She is alert and oriented to person, place, and time.   Psychiatric:         Mood and Affect: Mood normal.         Behavior: Behavior normal.            Vents:     Lines/Drains/Airways       Peripheral Intravenous Line  Duration                  Peripheral IV - Single Lumen 09/09/23 1913 20 G Distal;Left;Posterior Forearm 1 day         Peripheral IV - Single Lumen 09/09/23 2300 18 G Left Antecubital 1 day                  Significant Labs:    CBC/Anemia Profile:  Recent Labs   Lab 09/10/23  1715 09/10/23  2357 09/11/23  0837   WBC 9.31 8.47 7.84   HGB 12.1 11.1* 12.5   HCT 37.5 33.7* 39.4    207 208   * 102* 102*   RDW 14.5 14.2 14.5        Chemistries:  Recent Labs   Lab 09/10/23  0328 09/11/23  0348    140   K 4.0 3.1*    101   CO2 19* 26   BUN 20 20   CREATININE 1.0 1.0   CALCIUM 8.5* 8.9   ALBUMIN 3.5 3.6   PROT 6.1 6.3   BILITOT 1.6* 1.9*   ALKPHOS 93 93   ALT 24 27   AST 32 28   MG 1.8 1.9   PHOS 3.1 2.4*       All pertinent labs within the past 24 hours have been reviewed.    Significant Imaging:  I have reviewed all pertinent imaging results/findings within the past 24 hours.

## 2023-09-11 NOTE — ASSESSMENT & PLAN NOTE
62 year old female with mechanical AVR 2/2 bacterial endocarditis (on coumadin, INR 2.5-3.5), TAA repair with Dacron graft, VSD surgical patch repair, s/p pacemaker, colon polyps, pAF, and HTN admitted to the MICU on 9/10 with A-fib RVR. GI consulted for three reports of melena on 9/9 in the setting of stable anemia. Melena noted on rectal exam.    Recommendations:  - Will proceed with EGD Today given improvement in INR.  - Trend Hgb q12hrs. Transfuse for Hgb > 7, unless otherwise indicated  - Bolus IV pantoprazole 80mg followed by 40mg BID  - Maintain IV access with 2 large bore Ivs  - Intravascular resuscitation/support with IVFs   - Hold all NSAIDs and anticoagulants, unless contraindicated  - Please correct any coagulopathy with platelets and FFP for goal of platelets >50K and INR <2.0  - Please notify GI team if there is significant change in patient's clinical status

## 2023-09-11 NOTE — PROVATION PATIENT INSTRUCTIONS
Discharge Summary/Instructions after an Endoscopic Procedure  Patient Name: Naina Keyes  Patient MRN: [id number]  Patient YOB: 1960  Monday, September 11, 2023  Stefan Mendes MD  Dear patient,  As a result of recent federal legislation (The Federal Cures Act), you may   receive lab or pathology results from your procedure in your [facility]   account before your physician is able to contact you. Your physician or   their representative will relay the results to you with their   recommendations at their soonest availability.  Thank you,  RESTRICTIONS:  During your procedure today, you received medications for sedation.  These   medications may affect your judgment, balance and coordination.  Therefore,   for 24 hours, you have the following restrictions:   - DO NOT drive a car, operate machinery, make legal/financial decisions,   sign important papers or drink alcohol.    ACTIVITY:  Today: no heavy lifting, straining or running due to procedural   sedation/anesthesia.  The following day: return to full activity including work.  DIET:  Eat and drink normally unless instructed otherwise.     TREATMENT FOR COMMON SIDE EFFECTS:  - Mild abdominal pain, nausea, belching, bloating or excessive gas:  rest,   eat lightly and use a heating pad.  - Sore Throat: treat with throat lozenges and/or gargle with warm salt   water.  - Because air was used during the procedure, expelling large amounts of air   from your rectum or belching is normal.  - If a bowel prep was taken, you may not have a bowel movement for 1-3 days.    This is normal.  SYMPTOMS TO WATCH FOR AND REPORT TO YOUR PHYSICIAN:  1. Abdominal pain or bloating, other than gas cramps.  2. Chest pain.  3. Back pain.  4. Signs of infection such as: chills or fever occurring within 24 hours   after the procedure.  5. Rectal bleeding, which would show as bright red, maroon, or black stools.   (A tablespoon of blood from the rectum is not serious, especially if    hemorrhoids are present.)  6. Vomiting.  7. Weakness or dizziness.  GO DIRECTLY TO THE NEAREST EMERGENCY ROOM IF YOU HAVE ANY OF THE FOLLOWING:      Difficulty breathing              Chills and/or fever over 101 F   Persistent vomiting and/or vomiting blood   Severe abdominal pain   Severe chest pain   Black, tarry stools   Bleeding- more than one tablespoon   Any other symptom or condition that you feel may need urgent attention  Your doctor recommends these additional instructions:  If any biopsies were taken, your doctors clinic will contact you in 1 to 2   weeks with any results.  - Discharge patient to home.   - Resume previous diet.   - Continue present medications.   - Observe patient's clinical course.  For questions, problems or results please call your physician - Stefan Mendes MD at Work:  [phone].  [facility] [place], EMERGENCY ROOM PHONE NUMBER: [phone]  IF A COMPLICATION OR EMERGENCY SITUATION ARISES AND YOU ARE UNABLE TO REACH   YOUR PHYSICIAN - GO DIRECTLY TO THE EMERGENCY ROOM.  Stefan Mendes MD  9/11/2023 2:19:10 PM  This report has been verified and signed electronically.  Dear patient,  As a result of recent federal legislation (The Federal Cures Act), you may   receive lab or pathology results from your procedure in your [facility]   account before your physician is able to contact you. Your physician or   their representative will relay the results to you with their   recommendations at their soonest availability.  Thank you,  PROVATION

## 2023-09-11 NOTE — PROGRESS NOTES
"[facility]  Critical Care Medicine  Progress Note    Patient Name: Naina Keyes  MRN: [id number]  Admission Date: 9/9/2023  Hospital Length of Stay: 2 days  Code Status: Full Code  Attending Provider: Raul Caro MD  Primary Care Provider: Anjana Torres NP   Principal Problem: Atrial fibrillation with RVR    Subjective:     HPI:  Naina Keyes is a 61 yo F with an extensive cardiac history including mechanical AVR 2/2 bacterial endocarditis, TAA repair with Dacron graft, VSD surgical patch repair, s/p pacemaker, pAF, and HTN who is being admitted to MICU for further management of a-fib with RVR requiring titrating diltiazem drip. She initially presented to the ED complaining of intermittent palpitations x 1 week. During the episodes of palpitations, she also experienced concomitant SOB, nausea, fatigue, and generalized weakness. This morning, her symptoms were sustained and more severe, which prompted her to come to the ED. During our interview, she additionally mentioned that she has been experiencing epigastric abdominal pain described as "feeling my pulse in my stomach." She is on chronic anticoagulation and chronic antibiotics, denies missing any doses.     Upon arrival to the ED, BP 99/62, , RR 20, & SpO2 100% on RA. On exam, she had an irregularly irregular heart beat and was tachycardic. Significant labs: Trop negative, BNP 1157, D-dimer 0.45, lactate 1.3, T bili 1.8, CBC unremarkable. EKG with a-fib with RVR; no STEMI. CXR with no acute process. CT chest with bilateral small pleural effusions & findings possibly due to increased pulmonary venous pressure. She was given 1 L NS bolus, metoprolol 5 mg IV x 3 (11:50, 14:02, 14:43), diltiazem 10 mg IV x 1, and Toprol 100 mg x 1.       Hospital/ICU Course:  Patient initially presented to the ED complaining of intermittent palpitations x 1 week. During the episodes of palpitations, she also experienced concomitant SOB, nausea, fatigue, " and generalized weakness. She is on chronic anticoagulation and chronic antibiotics, denies missing any doses. Patient admitted to MICU for afib with RVR requiring diltiazem drip. Warfarin discontinued on 9/10/23 and patient switched to heparin in preparation for EGD, with plan for resumption of warfarin after procedure. Patient off diltiazem in ED and switched to amiodarone infusion with plans for PO metoprolol monotherapy after completion of EGD and with stable pressures.           Interval History/Significant Events: No acute overnight events    Review of Systems   Constitutional:  Negative for activity change and appetite change.   HENT:  Negative for congestion, ear pain and hearing loss.    Eyes:  Negative for discharge and redness.   Respiratory:  Positive for cough. Negative for shortness of breath and wheezing.    Cardiovascular:  Negative for chest pain, palpitations and leg swelling.   Gastrointestinal:  Negative for blood in stool and constipation.   Endocrine: Negative for polydipsia, polyphagia and polyuria.   Genitourinary:  Negative for difficulty urinating and hematuria.   Skin:  Negative for pallor, rash and wound.   Allergic/Immunologic: Negative for food allergies and immunocompromised state.   Neurological:  Negative for seizures and facial asymmetry.   Hematological:  Negative for adenopathy. Does not bruise/bleed easily.   Psychiatric/Behavioral:  Negative for agitation and confusion.    All other systems reviewed and are negative.    Objective:     Vital Signs (Most Recent):  Temp: 98 °F (36.7 °C) (09/11/23 1105)  Pulse: 71 (09/11/23 1200)  Resp: 18 (09/11/23 1200)  BP: (!) 107/56 (09/11/23 1200)  SpO2: 98 % (09/11/23 1200) Vital Signs (24h Range):  Temp:  [97.8 °F (36.6 °C)-98.3 °F (36.8 °C)] 98 °F (36.7 °C)  Pulse:  [] 71  Resp:  [15-36] 18  SpO2:  [92 %-99 %] 98 %  BP: ()/(51-77) 107/56   Weight: 68 kg (150 lb)  Body mass index is 26.57 kg/m².      Intake/Output Summary (Last  "24 hours) at 9/11/2023 1330  Last data filed at 9/11/2023 1300  Gross per 24 hour   Intake 779.52 ml   Output 2550 ml   Net -1770.48 ml          Physical Exam  Vitals and nursing note reviewed.   Constitutional:       Appearance: Normal appearance.   HENT:      Head: Normocephalic and atraumatic.   Cardiovascular:      Rate and Rhythm: Normal rate and regular rhythm.   Pulmonary:      Effort: Pulmonary effort is normal.      Breath sounds: Normal breath sounds.   Abdominal:      General: There is no distension.      Palpations: Abdomen is soft.   Skin:     General: Skin is warm and dry.   Neurological:      General: No focal deficit present.      Mental Status: She is alert and oriented to person, place, and time.   Psychiatric:         Mood and Affect: Mood normal.         Behavior: Behavior normal.            Vents:     Lines/Drains/Airways       Peripheral Intravenous Line  Duration                  Peripheral IV - Single Lumen 09/09/23 1913 20 G Distal;Left;Posterior Forearm 1 day         Peripheral IV - Single Lumen 09/09/23 2300 18 G Left Antecubital 1 day                  Significant Labs:    CBC/Anemia Profile:  Recent Labs   Lab 09/10/23  1715 09/10/23  2357 09/11/23  0837   WBC 9.31 8.47 7.84   HGB 12.1 11.1* 12.5   HCT 37.5 33.7* 39.4    207 208   * 102* 102*   RDW 14.5 14.2 14.5        Chemistries:  Recent Labs   Lab 09/10/23  0328 09/11/23  0348    140   K 4.0 3.1*    101   CO2 19* 26   BUN 20 20   CREATININE 1.0 1.0   CALCIUM 8.5* 8.9   ALBUMIN 3.5 3.6   PROT 6.1 6.3   BILITOT 1.6* 1.9*   ALKPHOS 93 93   ALT 24 27   AST 32 28   MG 1.8 1.9   PHOS 3.1 2.4*       All pertinent labs within the past 24 hours have been reviewed.    Significant Imaging:  I have reviewed all pertinent imaging results/findings within the past 24 hours.      ABG  No results for input(s): "PH", "PO2", "PCO2", "HCO3", "BE" in the last 168 hours.  Assessment/Plan:     Cardiac/Vascular  * Atrial " fibrillation with RVR  Patient with extensive cardiac history presented to the ED c/o one-week history of intermittent palpitations associated with SOB, nausea, fatigue, and generalized weakness. In the ED, EKG with a-fib with RVR. She was given metoprolol 5 mg IV x 3 and diltiazem 10 mg IV x 1 with no change in her heart rhythm. She is being admitted to the ICU for titrating diltiazem drip.     Decompensation: Etiology may be 2/2 hypervolemia s/o ADHF  Current rhythm: AF with RVR  Home meds: States previous history of AF RVR (documented history of metoprolol and sotalol, was supposed to follow up with cardiologist OP for regimen clarification)    -- Rate control: diltiazem gtt with goal HR < 110; titrate off and substitute with home beta-blocker (titrate to goal rate)   - transitioned to amiodarone infusion. Now in NSR. Discussed with Cardiology, appreciate recs    - complete infusion and maintain PO metoprolol, no PO amiodarone following infusion  -- Patient anticoagulated with warfarin for mechanical aortic valve   - holding for endoscopy  -- Cardiac telemetry  -- Maintain K > 4, Mg > 2, Ca wnl; replete PRN  -- STAT cardiology consult if hemodynamic instability for DCCV evaluation    Essential hypertension  Blood Pressure: 120s-130s SBP  Home Medications: valsartan 160 bid, amlodipine 5, metoprolol 100 qd    -- Restart PO home medications as tolerated; holding s/o normotension and GI bleed      Acute decompensated heart failure  Patient with recent onset of orthopnea, increased cough with laying back. Patient is not on a home diuretic regimen, nor does she believe she has been diagnosed with heart failure. She has an extensive cardiac history, however, and states she may have coded on a procedure table 30+ years ago when her initial AVR was placed.    TTE (05/2022) EF 60%  Home meds: Valsartan, Toprol  Dry weight: Unknown  Diagnostics: CXR coarse interstitial opacities relatively stable, no pleural effusion; BNP  1000+; Lactate 1.3; O2 requirement: None    -- Aggressive diuresis: Lasix 40 qd; titrate to clinical response and UOP goal  -- Strict I/Os; goal net negative 1.5 - 2 L / day  -- Daily weights (standing if tolerated)  -- Fluid restriction at 1500mL  -- Cardiac diet w/ 2 g Na restriction  -- GDMT held s/o active GI bleed and normal pressures; will restart when clinically appropriate  -- Repeat TTE pending      S/P Mechanical AVR secondary to bacterial endocarditis -1997, St Patric valve  S/P VSD surgical patch repair 1997  S/P thoracic aortic aneurysm repair with Dacron graft 2007    Follows with Dr Colton Gallegos in Interventional Cardiology. Anticoagulated with warfarin.    -- Continue warfarin while inpatient  -- Appreciate PharmD assistance in dosing adjustment while inpatient  -- Daily INR    9/10: holding warfarin for endoscopy tomorrow    GI  Gastrointestinal hemorrhage with melena  Symptoms: 3x witnessed melenic stools while inpatient, epigastric pain  Diagnostics: Hgb 12.5 (baseline 11-12s), MCV 100s, , Coag INR 2.8  APT / AC: Warfarin for aortic mechanical valve    -- GI consult for EGD / C-Scope consult for source evaluation +/- intervention  -- IV Protonix 80mg x 1, followed by IV 40mg BID thereafter  -- NPO  -- Trend CBCs ; Transfuse Hgb < 7  -- Resuscitate IVF PRN  -- Cardiac telemetry  -- Maintain 2 large bore IVs  -- If hemodynamically unstable +/- new bleed, stat CTA / consult IR for embolization evaluation  -- Holding warfarin for endoscopy. Planned 9/11, will start heparin/lovenox if delayed to 9/12      After completion of EGD and transition of patient to PO metoprolol from amiodarone drip, patient has no ICU-level requirements. Will start stepdown.     Critical Care Daily Checklist:    A: Awake: RASS Goal/Actual Goal: RASS Goal: 0-->alert and calm  Actual:     B: Spontaneous Breathing Trial Performed?     C: SAT & SBT Coordinated?  N/A                   D: Delirium: CAM-ICU Overall CAM-ICU:  Negative   E: Early Mobility Performed? Yes   F: Feeding Goal:    Status:     Current Diet Order   Procedures    Diet NPO Except for: Medication, Ice Chips     Order Specific Question:   Except for     Answer:   Medication     Order Specific Question:   Except for     Answer:   Ice Chips      AS: Analgesia/Sedation N/A   T: Thromboembolic Prophylaxis Holding warfarin pending EGD   H: HOB > 300 Yes   U: Stress Ulcer Prophylaxis (if needed) yes   G: Glucose Control yes   B: Bowel Function Stool Occurrence: 1   I: Indwelling Catheter (Lines & Don) Necessity N/A   D: De-escalation of Antimicrobials/Pharmacotherapies D/c abx    Plan for the day/ETD EGD, then resume anticoagulation    Code Status:  Family/Goals of Care: Full Code  Full code       Critical secondary to Patient has a condition that poses threat to life and bodily function: afib RVR      Critical care was time spent personally by me on the following activities: development of treatment plan with patient or surrogate and bedside caregivers, discussions with consultants, evaluation of patient's response to treatment, examination of patient, ordering and performing treatments and interventions, ordering and review of laboratory studies, ordering and review of radiographic studies, pulse oximetry, re-evaluation of patient's condition. This critical care time did not overlap with that of any other provider or involve time for any procedures.     Mateo Barney MD  Critical Care Medicine  [facility]

## 2023-09-12 LAB
ALBUMIN SERPL BCP-MCNC: 3.5 G/DL (ref 3.5–5.2)
ALP SERPL-CCNC: 105 U/L (ref 55–135)
ALT SERPL W/O P-5'-P-CCNC: 25 U/L (ref 10–44)
ANION GAP SERPL CALC-SCNC: 12 MMOL/L (ref 8–16)
APTT PPP: 29.6 SEC (ref 21–32)
APTT PPP: 43.9 SEC (ref 21–32)
AST SERPL-CCNC: 26 U/L (ref 10–40)
BASOPHILS # BLD AUTO: 0.06 K/UL (ref 0–0.2)
BASOPHILS # BLD AUTO: 0.07 K/UL (ref 0–0.2)
BASOPHILS NFR BLD: 0.6 % (ref 0–1.9)
BASOPHILS NFR BLD: 0.8 % (ref 0–1.9)
BILIRUB SERPL-MCNC: 1.2 MG/DL (ref 0.1–1)
BUN SERPL-MCNC: 25 MG/DL (ref 8–23)
CALCIUM SERPL-MCNC: 9 MG/DL (ref 8.7–10.5)
CHLORIDE SERPL-SCNC: 103 MMOL/L (ref 95–110)
CO2 SERPL-SCNC: 23 MMOL/L (ref 23–29)
CREAT SERPL-MCNC: 1 MG/DL (ref 0.5–1.4)
DIFFERENTIAL METHOD: ABNORMAL
DIFFERENTIAL METHOD: ABNORMAL
EOSINOPHIL # BLD AUTO: 0.3 K/UL (ref 0–0.5)
EOSINOPHIL # BLD AUTO: 0.3 K/UL (ref 0–0.5)
EOSINOPHIL NFR BLD: 3.4 % (ref 0–8)
EOSINOPHIL NFR BLD: 3.4 % (ref 0–8)
ERYTHROCYTE [DISTWIDTH] IN BLOOD BY AUTOMATED COUNT: 14.7 % (ref 11.5–14.5)
ERYTHROCYTE [DISTWIDTH] IN BLOOD BY AUTOMATED COUNT: 14.8 % (ref 11.5–14.5)
EST. GFR  (NO RACE VARIABLE): >60 ML/MIN/1.73 M^2
GLUCOSE SERPL-MCNC: 117 MG/DL (ref 70–110)
HCT VFR BLD AUTO: 37 % (ref 37–48.5)
HCT VFR BLD AUTO: 39.7 % (ref 37–48.5)
HGB BLD-MCNC: 13.2 G/DL (ref 12–16)
HGB BLD-MCNC: 13.5 G/DL (ref 12–16)
IMM GRANULOCYTES # BLD AUTO: 0.02 K/UL (ref 0–0.04)
IMM GRANULOCYTES # BLD AUTO: 0.03 K/UL (ref 0–0.04)
IMM GRANULOCYTES NFR BLD AUTO: 0.2 % (ref 0–0.5)
IMM GRANULOCYTES NFR BLD AUTO: 0.3 % (ref 0–0.5)
INR PPP: 1.6 (ref 0.8–1.2)
INR PPP: 1.7 (ref 0.8–1.2)
LYMPHOCYTES # BLD AUTO: 1 K/UL (ref 1–4.8)
LYMPHOCYTES # BLD AUTO: 1.3 K/UL (ref 1–4.8)
LYMPHOCYTES NFR BLD: 10.8 % (ref 18–48)
LYMPHOCYTES NFR BLD: 15.4 % (ref 18–48)
MAGNESIUM SERPL-MCNC: 2.1 MG/DL (ref 1.6–2.6)
MCH RBC QN AUTO: 34.8 PG (ref 27–31)
MCH RBC QN AUTO: 35.1 PG (ref 27–31)
MCHC RBC AUTO-ENTMCNC: 34 G/DL (ref 32–36)
MCHC RBC AUTO-ENTMCNC: 35.7 G/DL (ref 32–36)
MCV RBC AUTO: 102 FL (ref 82–98)
MCV RBC AUTO: 98 FL (ref 82–98)
MONOCYTES # BLD AUTO: 0.7 K/UL (ref 0.3–1)
MONOCYTES # BLD AUTO: 0.8 K/UL (ref 0.3–1)
MONOCYTES NFR BLD: 8.1 % (ref 4–15)
MONOCYTES NFR BLD: 8.9 % (ref 4–15)
NEUTROPHILS # BLD AUTO: 6.2 K/UL (ref 1.8–7.7)
NEUTROPHILS # BLD AUTO: 7 K/UL (ref 1.8–7.7)
NEUTROPHILS NFR BLD: 72.1 % (ref 38–73)
NEUTROPHILS NFR BLD: 76 % (ref 38–73)
NRBC BLD-RTO: 0 /100 WBC
NRBC BLD-RTO: 0 /100 WBC
PHOSPHATE SERPL-MCNC: 3.1 MG/DL (ref 2.7–4.5)
PLATELET # BLD AUTO: 244 K/UL (ref 150–450)
PLATELET # BLD AUTO: 257 K/UL (ref 150–450)
PMV BLD AUTO: 10 FL (ref 9.2–12.9)
PMV BLD AUTO: 10.1 FL (ref 9.2–12.9)
POCT GLUCOSE: 123 MG/DL (ref 70–110)
POTASSIUM SERPL-SCNC: 3.5 MMOL/L (ref 3.5–5.1)
PROT SERPL-MCNC: 6.4 G/DL (ref 6–8.4)
PROTHROMBIN TIME: 17.1 SEC (ref 9–12.5)
PROTHROMBIN TIME: 17.6 SEC (ref 9–12.5)
RBC # BLD AUTO: 3.76 M/UL (ref 4–5.4)
RBC # BLD AUTO: 3.88 M/UL (ref 4–5.4)
SODIUM SERPL-SCNC: 138 MMOL/L (ref 136–145)
WBC # BLD AUTO: 8.56 K/UL (ref 3.9–12.7)
WBC # BLD AUTO: 9.25 K/UL (ref 3.9–12.7)

## 2023-09-12 PROCEDURE — 85025 COMPLETE CBC W/AUTO DIFF WBC: CPT | Mod: 91 | Performed by: INTERNAL MEDICINE

## 2023-09-12 PROCEDURE — 83735 ASSAY OF MAGNESIUM: CPT

## 2023-09-12 PROCEDURE — 99232 PR SUBSEQUENT HOSPITAL CARE,LEVL II: ICD-10-PCS | Mod: GC,,, | Performed by: INTERNAL MEDICINE

## 2023-09-12 PROCEDURE — 36415 COLL VENOUS BLD VENIPUNCTURE: CPT

## 2023-09-12 PROCEDURE — 99232 SBSQ HOSP IP/OBS MODERATE 35: CPT | Mod: GC,,, | Performed by: INTERNAL MEDICINE

## 2023-09-12 PROCEDURE — 85730 THROMBOPLASTIN TIME PARTIAL: CPT

## 2023-09-12 PROCEDURE — 80053 COMPREHEN METABOLIC PANEL: CPT

## 2023-09-12 PROCEDURE — 20600001 HC STEP DOWN PRIVATE ROOM

## 2023-09-12 PROCEDURE — 25000003 PHARM REV CODE 250: Performed by: HOSPITALIST

## 2023-09-12 PROCEDURE — A4216 STERILE WATER/SALINE, 10 ML: HCPCS | Performed by: ANESTHESIOLOGY

## 2023-09-12 PROCEDURE — 63600175 PHARM REV CODE 636 W HCPCS: Mod: UD | Performed by: STUDENT IN AN ORGANIZED HEALTH CARE EDUCATION/TRAINING PROGRAM

## 2023-09-12 PROCEDURE — 25000003 PHARM REV CODE 250

## 2023-09-12 PROCEDURE — C9113 INJ PANTOPRAZOLE SODIUM, VIA: HCPCS

## 2023-09-12 PROCEDURE — 25000003 PHARM REV CODE 250: Performed by: ANESTHESIOLOGY

## 2023-09-12 PROCEDURE — 84100 ASSAY OF PHOSPHORUS: CPT

## 2023-09-12 PROCEDURE — 63600175 PHARM REV CODE 636 W HCPCS

## 2023-09-12 PROCEDURE — 85610 PROTHROMBIN TIME: CPT

## 2023-09-12 PROCEDURE — 99233 PR SUBSEQUENT HOSPITAL CARE,LEVL III: ICD-10-PCS | Mod: GC,,, | Performed by: INTERNAL MEDICINE

## 2023-09-12 PROCEDURE — 85730 THROMBOPLASTIN TIME PARTIAL: CPT | Mod: 91 | Performed by: INTERNAL MEDICINE

## 2023-09-12 PROCEDURE — 63600175 PHARM REV CODE 636 W HCPCS: Mod: UD

## 2023-09-12 PROCEDURE — 85610 PROTHROMBIN TIME: CPT | Mod: 91

## 2023-09-12 PROCEDURE — 99233 SBSQ HOSP IP/OBS HIGH 50: CPT | Mod: GC,,, | Performed by: INTERNAL MEDICINE

## 2023-09-12 PROCEDURE — 94761 N-INVAS EAR/PLS OXIMETRY MLT: CPT

## 2023-09-12 PROCEDURE — 36415 COLL VENOUS BLD VENIPUNCTURE: CPT | Performed by: INTERNAL MEDICINE

## 2023-09-12 PROCEDURE — 85025 COMPLETE CBC W/AUTO DIFF WBC: CPT

## 2023-09-12 RX ORDER — CEFADROXIL 500 MG/1
1 CAPSULE ORAL EVERY 12 HOURS
Status: DISCONTINUED | OUTPATIENT
Start: 2023-09-12 | End: 2023-09-14 | Stop reason: HOSPADM

## 2023-09-12 RX ORDER — HEPARIN SODIUM,PORCINE/D5W 25000/250
0-40 INTRAVENOUS SOLUTION INTRAVENOUS CONTINUOUS
Status: DISCONTINUED | OUTPATIENT
Start: 2023-09-12 | End: 2023-09-14

## 2023-09-12 RX ORDER — POTASSIUM CHLORIDE 750 MG/1
10 CAPSULE, EXTENDED RELEASE ORAL ONCE
Status: COMPLETED | OUTPATIENT
Start: 2023-09-12 | End: 2023-09-12

## 2023-09-12 RX ORDER — FUROSEMIDE 10 MG/ML
40 INJECTION INTRAMUSCULAR; INTRAVENOUS 2 TIMES DAILY
Status: DISCONTINUED | OUTPATIENT
Start: 2023-09-12 | End: 2023-09-12

## 2023-09-12 RX ORDER — PANTOPRAZOLE SODIUM 40 MG/1
40 TABLET, DELAYED RELEASE ORAL DAILY
Status: DISCONTINUED | OUTPATIENT
Start: 2023-09-13 | End: 2023-09-14 | Stop reason: HOSPADM

## 2023-09-12 RX ADMIN — POTASSIUM CHLORIDE 10 MEQ: 10 CAPSULE, COATED, EXTENDED RELEASE ORAL at 01:09

## 2023-09-12 RX ADMIN — Medication 3 ML: at 05:09

## 2023-09-12 RX ADMIN — WARFARIN SODIUM 7.5 MG: 7.5 TABLET ORAL at 05:09

## 2023-09-12 RX ADMIN — CEFADROXIL 1 G: 500 CAPSULE ORAL at 08:09

## 2023-09-12 RX ADMIN — Medication 3 ML: at 12:09

## 2023-09-12 RX ADMIN — ESCITALOPRAM OXALATE 10 MG: 5 TABLET, FILM COATED ORAL at 08:09

## 2023-09-12 RX ADMIN — FUROSEMIDE 40 MG: 10 INJECTION, SOLUTION INTRAMUSCULAR; INTRAVENOUS at 11:09

## 2023-09-12 RX ADMIN — PANTOPRAZOLE SODIUM 40 MG: 40 INJECTION, POWDER, FOR SOLUTION INTRAVENOUS at 08:09

## 2023-09-12 RX ADMIN — HEPARIN SODIUM 12 UNITS/KG/HR: 10000 INJECTION, SOLUTION INTRAVENOUS at 11:09

## 2023-09-12 RX ADMIN — Medication 3 ML: at 06:09

## 2023-09-12 RX ADMIN — METOPROLOL SUCCINATE 100 MG: 100 TABLET, EXTENDED RELEASE ORAL at 08:09

## 2023-09-12 RX ADMIN — MUPIROCIN: 20 OINTMENT TOPICAL at 08:09

## 2023-09-12 NOTE — NURSING
MICU DAILY GOALS     Family/Goals of care/Code Status   Code Status: Full Code    24H Vital Sign Range  Temp:  [97.5 °F (36.4 °C)-98.3 °F (36.8 °C)]   Pulse:  [65-74]   Resp:  [15-44]   BP: ()/(51-77)   SpO2:  [93 %-99 %]      Shift Events   No acute events throughout shift    AWAKE RASS: Goal - RASS Goal: 0-->alert and calm  Actual - RASS (Meyers Agitation-Sedation Scale): alert and calm    Restraint necessity: Not necessary   BREATHE SBT: Not intubated    Coordinate A & B, analgesics/sedatives Pain: managed   SAT: Not intubated   Delirium CAM-ICU: Overall CAM-ICU: Negative   Early(intubated/ Progressive (non-intubated) Mobility MOVE Screen: Pass   Activity: Activity Management: Rolling - L1   Feeding/Nutrition Diet order: Diet/Nutrition Received: low saturated fat/low cholesterol, 2 gram sodium,     Thrombus DVT prophylaxis: VTE Required Core Measure: Pharmacological prophylaxis initiated/maintained   HOB Elevation Head of Bed (HOB) Positioning: HOB at 15 degrees   Ulcer Prophylaxis GI: yes   Glucose control managed     Skin Skin assessed during daily assessment.   Bowel Function no issues    Indwelling Catheter Necessity         n   De-escalation Antibiotics See MAR       VS and assessment per flow sheet, patient progressing towards goals as tolerated, plan of care reviewed with  pt , all concerns addressed, will continue to monitor.

## 2023-09-12 NOTE — ASSESSMENT & PLAN NOTE
· 2D echo Sept 2023 with EF 55%, likely provoked from afib with RVR  · BNP >1,000 and CXR with pulmonary edema  · Started on IV diuresis  · Strict I&Os with daily weights  · Low Sodium diet with 1.5L fluid restriction  · Continue Metoprolol  · Cardiology consulted:  Suggest Lasix prn

## 2023-09-12 NOTE — HOSPITAL COURSE
Ms. Keyes was initially admitted to MICU for management of afib with RVR.  She was started on a titrating Diltiazem drip with no improvement.  Cardiology was consulted and she was started on an Amio gtt and IV Lasix.  Heart rates improved and Cardiology suggested no PO Amio, just Metoprolol.  She was SD to  on 9/10 but boarded in the ICU while waiting on a floor bed.  She was found to have melanotic stools.  GI was consulted and Warfarin was held.  She underwent EGD 9/11 that showed erosive gastropathy with no bleeding and no stigmata of recent bleeding or ulcer.  She was changed to PO PPI daily.  She was started back on Warfarin with Heparin bridge while awaiting therapeutic INR. She arrived to the floor on 9/12.    On 9/13; INR became therapeutic. HR is controlled. Pt is feeling great. No black stool. Will discharge home with cardiology follow up outpt.     Below are the medical problems that were addressed this hospitalization;     * Atrial fibrillation with RVR  Patient with Paroxysmal (<7 days) atrial fibrillation. [id number] Score: 1. HASBLED Score: 2. Anticoagulation indicated. Anticoagulation done with Heparin bridge to Warfarin. INR is therapeutic today.     Initially admitted to MICU with afib with RVR  Started on a Dilt gtt with no improvement in HR  Cardiology consulted and started on an Amio gtt with improvement in HR  Continue Metoprolol monotherapy - Per Cards PO Amio not needed. Pt will be discharged on metoprolol xl 100 mg daily.  Heparin bridged to Warfarin. INR is therapeutic today. Per pharmacy, pt can be discharged on warfarin 5 mg daily with repeat INR level. Pt was asked to have her INR checked in 2 days. She follows up with cardiology clinic which manages her INR outpt.      Acute decompensated heart failure  2D echo Sept 2023 with EF 55%, likely provoked from afib with RVR  BNP >1,000 and CXR with pulmonary edema  Started on IV diuresis  Strict I&Os with daily weights  Low Sodium diet with  1.5L fluid restriction  Continue Metoprolol  Cardiology consulted:  Suggest Lasix prn on discharge        S/P Mechanical AVR secondary to bacterial endocarditis -1997, St Patric valve  Follows with Dr. Gallegos of Interventional Cardiology  Continue suppressive therapy with Cefadroxil 1g BID  Continue coumadin with goal of INR 2.5 - 3.5 given that pt also has afib.        Gastrointestinal hemorrhage with melena  While in ICU had melanotic stools  Hgb remains stable, no transfusions required  GI consulted  EGD 9/11 with erosive gastropathy with no bleeding and no stigmata of recent bleeding or ulcer  Continue PPI daily         S/P thoracic aortic aneurysm repair with Dacron graft (2007)  Follows with Dr. Gallegos of Interventional Cardiology  Continue suppressive therapy with Cefadroxil 1g BID        S/P VSD surgical patch repair -1997  Follows with Dr. Gallegos of Interventional Cardiology  Continue suppressive therapy with Cefadroxil 1g BID    Pt's clinical status much improved, she will be discharged home today with follow up with cardiology and the coumadin clinic outpt.      Physical exam;   Vitals; reviewed  General; no acute distress  HENT; NC/AT  CV; RRR  Resp; CTA bilateral lung fields.  Abdomen; soft, NT, ND

## 2023-09-12 NOTE — PROGRESS NOTES
"[facility] ([place] [id number])  Critical Care Medicine  Progress Note    Patient Name: Naina Keyes  MRN: [id number]  Admission Date: 9/9/2023  Hospital Length of Stay: 3 days  Code Status: Full Code  Attending Provider: Raul Caro MD  Primary Care Provider: Anjana Torres NP   Principal Problem: Atrial fibrillation with RVR    Subjective:     HPI:  Naina Keyes is a 63 yo F with an extensive cardiac history including mechanical AVR 2/2 bacterial endocarditis, TAA repair with Dacron graft, VSD surgical patch repair, s/p pacemaker, pAF, and HTN who is being admitted to MICU for further management of a-fib with RVR requiring titrating diltiazem drip. She initially presented to the ED complaining of intermittent palpitations x 1 week. During the episodes of palpitations, she also experienced concomitant SOB, nausea, fatigue, and generalized weakness. This morning, her symptoms were sustained and more severe, which prompted her to come to the ED. During our interview, she additionally mentioned that she has been experiencing epigastric abdominal pain described as "feeling my pulse in my stomach." She is on chronic anticoagulation and chronic antibiotics, denies missing any doses.     Upon arrival to the ED, BP 99/62, , RR 20, & SpO2 100% on RA. On exam, she had an irregularly irregular heart beat and was tachycardic. Significant labs: Trop negative, BNP 1157, D-dimer 0.45, lactate 1.3, T bili 1.8, CBC unremarkable. EKG with a-fib with RVR; no STEMI. CXR with no acute process. CT chest with bilateral small pleural effusions & findings possibly due to increased pulmonary venous pressure. She was given 1 L NS bolus, metoprolol 5 mg IV x 3 (11:50, 14:02, 14:43), diltiazem 10 mg IV x 1, and Toprol 100 mg x 1.       Hospital/ICU Course:  Patient initially presented to the ED complaining of intermittent palpitations x 1 week. During the episodes of palpitations, she also experienced concomitant " SOB, nausea, fatigue, and generalized weakness. She is on chronic anticoagulation and chronic antibiotics, denies missing any doses. Patient admitted to MICU for afib with RVR requiring diltiazem drip. Warfarin discontinued on 9/10/23 and patient switched to heparin in preparation for EGD, with plan for resumption of warfarin after procedure. Patient off diltiazem in ED and switched to amiodarone infusion prior to EGD. After completion of EGD, warfarin resumed with heparin bridge. INR goal 2.5-3.5 per cardiology. Patient started on PO metoprolol, off amiodarone infusion, and stepped down from MICU.           Interval History/Significant Events: No acute overnight events    Review of Systems   Constitutional:  Negative for activity change, chills and fever.   HENT:  Negative for nosebleeds and sore throat.    Eyes:  Negative for discharge and redness.   Respiratory:  Positive for cough. Negative for chest tightness and shortness of breath.    Cardiovascular:  Negative for chest pain, palpitations and leg swelling.   Gastrointestinal:  Negative for abdominal pain, nausea and vomiting.   Endocrine: Negative for polydipsia, polyphagia and polyuria.   Genitourinary:  Negative for difficulty urinating and hematuria.   Musculoskeletal:  Negative for arthralgias and neck stiffness.   Skin:  Negative for pallor, rash and wound.   Allergic/Immunologic: Negative for environmental allergies and food allergies.   Neurological:  Negative for dizziness and light-headedness.   Hematological:  Negative for adenopathy. Does not bruise/bleed easily.   Psychiatric/Behavioral:  Negative for agitation and behavioral problems.    All other systems reviewed and are negative.    Objective:     Vital Signs (Most Recent):  Temp: 97.8 °F (36.6 °C) (09/12/23 0705)  Pulse: 67 (09/12/23 1115)  Resp: 19 (09/12/23 0900)  BP: 133/62 (09/12/23 0900)  SpO2: 96 % (09/12/23 0900) Vital Signs (24h Range):  Temp:  [97.5 °F (36.4 °C)-98 °F (36.7 °C)] 97.8  "°F (36.6 °C)  Pulse:  [67-79] 67  Resp:  [15-44] 19  SpO2:  [93 %-99 %] 96 %  BP: ()/(51-77) 133/62   Weight: 68 kg (150 lb)  Body mass index is 26.57 kg/m².      Intake/Output Summary (Last 24 hours) at 9/12/2023 1324  Last data filed at 9/11/2023 1600  Gross per 24 hour   Intake 153.9 ml   Output --   Net 153.9 ml          Physical Exam  Vitals and nursing note reviewed. Exam conducted with a chaperone present.   Constitutional:       Appearance: Normal appearance.   Cardiovascular:      Rate and Rhythm: Normal rate and regular rhythm.   Pulmonary:      Effort: Pulmonary effort is normal.      Breath sounds: Normal breath sounds.   Abdominal:      Palpations: Abdomen is soft.      Tenderness: There is no abdominal tenderness. There is no guarding or rebound.   Skin:     General: Skin is warm and dry.   Neurological:      General: No focal deficit present.      Mental Status: She is alert and oriented to person, place, and time.            Vents:     Lines/Drains/Airways       Peripheral Intravenous Line  Duration                  Peripheral IV - Single Lumen 09/09/23 2300 18 G Left Antecubital 2 days                  Significant Labs:    CBC/Anemia Profile:  Recent Labs   Lab 09/11/23  0837 09/11/23  2033 09/12/23  1042   WBC 7.84 8.09 9.25   HGB 12.5 12.9 13.5   HCT 39.4 35.2* 39.7    225 244   * 98 102*   RDW 14.5 14.7* 14.7*        Chemistries:  Recent Labs   Lab 09/11/23  0348 09/12/23  0422    138   K 3.1* 3.5    103   CO2 26 23   BUN 20 25*   CREATININE 1.0 1.0   CALCIUM 8.9 9.0   ALBUMIN 3.6 3.5   PROT 6.3 6.4   BILITOT 1.9* 1.2*   ALKPHOS 93 105   ALT 27 25   AST 28 26   MG 1.9 2.1   PHOS 2.4* 3.1       All pertinent labs within the past 24 hours have been reviewed.    Significant Imaging:  I have reviewed all pertinent imaging results/findings within the past 24 hours.      ABG  No results for input(s): "PH", "PO2", "PCO2", "HCO3", "BE" in the last 168 " hours.  Assessment/Plan:     Cardiac/Vascular  * Atrial fibrillation with RVR  Patient with extensive cardiac history presented to the ED c/o one-week history of intermittent palpitations associated with SOB, nausea, fatigue, and generalized weakness. In the ED, EKG with a-fib with RVR. She was given metoprolol 5 mg IV x 3 and diltiazem 10 mg IV x 1 with no change in her heart rhythm. She is being admitted to the ICU for titrating diltiazem drip.     Decompensation: Etiology may be 2/2 hypervolemia s/o ADHF  Current rhythm: AF with RVR  Home meds: States previous history of AF RVR (documented history of metoprolol and sotalol, was supposed to follow up with cardiologist OP for regimen clarification)    -- Rate control: diltiazem gtt with goal HR < 110; titrate off and substitute with amiodarone   - transitioned to amiodarone infusion. Now in NSR. Discussed with Cardiology, appreciate recs    - cardiology recommends PO metoprolol; now that EGD shows no evidence of GI bleeding, will switch to PO  -- Patient anticoagulated with warfarin for mechanical aortic valve   - held for endoscopy; restarting  -- Cardiac telemetry  -- Maintain K > 4, Mg > 2, Ca wnl; replete PRN  -- STAT cardiology consult if hemodynamic instability for DCCV evaluation    Essential hypertension  Blood Pressure: 120s-130s SBP  Home Medications: valsartan 160 bid, amlodipine 5, metoprolol 100 qd    -- Restart PO home medications as tolerated; holding s/o normotension and GI bleed      Acute decompensated heart failure  Patient with recent onset of orthopnea, increased cough with laying back. Patient is not on a home diuretic regimen, nor does she believe she has been diagnosed with heart failure. She has an extensive cardiac history, however, and states she may have coded on a procedure table 30+ years ago when her initial AVR was placed.    TTE (09/2023) EF 55%  Home meds: Valsartan, Toprol  Dry weight: Unknown  Diagnostics: CXR coarse interstitial  opacities relatively stable, no pleural effusion; BNP 1000+; Lactate 1.3; O2 requirement: None    -- Aggressive diuresis: Lasix 40 qd; titrate to clinical response and UOP goal  -- Strict I/Os; goal net negative 1.5 - 2 L / day  -- Daily weights (standing if tolerated)  -- Fluid restriction at 1500mL  -- Cardiac diet w/ 2 g Na restriction  -- GDMT held s/o active GI bleed and normal pressures; will restart when clinically appropriate  -- Repeat TTE completed 9/11/23, EF 55%      S/P Mechanical AVR secondary to bacterial endocarditis -1997, [facility] [employer] valve  S/P VSD surgical patch repair 1997  S/P thoracic aortic aneurysm repair with Dacron graft 2007    Follows with Dr Colton Gallegos in Interventional Cardiology. Anticoagulated with warfarin.    -- Continue warfarin while inpatient  -- Appreciate PharmD assistance in dosing adjustment while inpatient  -- Daily INR    9/11: Endoscopy complete without evidence of GI bleed; warfarin restarted with heparin bridge. Confirmed INR goal with cardiology 2.5-3.5    GI  Gastrointestinal hemorrhage with melena  Symptoms: 3x witnessed melenic stools while inpatient, epigastric pain  Diagnostics: Hgb 12.5 (baseline 11-12s), MCV 100s, , Coag INR 2.8  APT / AC: Warfarin for aortic mechanical valve    -- GI consult for EGD / C-Scope consult for source evaluation +/- intervention  -- IV Protonix 80mg x 1, followed by IV 40mg BID thereafter  -- NPO  -- Trend CBCs ; Transfuse Hgb < 7  -- Resuscitate IVF PRN  -- Cardiac telemetry  -- Maintain 2 large bore IVs  -- If hemodynamically unstable +/- new bleed, stat CTA / consult IR for embolization evaluation  -- Warfarin resumed after negative EGD with heparin bridge         Critical Care Daily Checklist:    A: Awake: RASS Goal/Actual Goal: RASS Goal: 0-->alert and calm  Actual:     B: Spontaneous Breathing Trial Performed?     C: SAT & SBT Coordinated?  n/a                      D: Delirium: CAM-ICU Overall CAM-ICU: Negative   E: Early  Mobility Performed? Yes   F: Feeding Goal:    Status:     Current Diet Order   Procedures    Diet Cardiac      AS: Analgesia/Sedation n/a   T: Thromboembolic Prophylaxis Heparin bridge to warfarin   H: HOB > 300 Yes   U: Stress Ulcer Prophylaxis (if needed) yes   G: Glucose Control yes   B: Bowel Function Stool Occurrence: 1   I: Indwelling Catheter (Lines & Don) Necessity n/a   D: De-escalation of Antimicrobials/Pharmacotherapies Amiodarone drip to metoprolol PO    Plan for the day/ETD Stepdown    Code Status:  Family/Goals of Care: Full Code  Full code       Critical secondary to Patient has a condition that poses threat to life and bodily function: afib RVR      Critical care was time spent personally by me on the following activities: development of treatment plan with patient or surrogate and bedside caregivers, discussions with consultants, evaluation of patient's response to treatment, examination of patient, ordering and performing treatments and interventions, ordering and review of laboratory studies, ordering and review of radiographic studies, pulse oximetry, re-evaluation of patient's condition. This critical care time did not overlap with that of any other provider or involve time for any procedures.     Mateo Barney MD  Critical Care Medicine  [facility] - [facility] ([place]

## 2023-09-12 NOTE — PLAN OF CARE
CHW met with patient/family at bedside. Patient experience rounding completed and reviewed the following.     Do you know your discharge plan? Yes staying with a friend.     Have you discussed your needs and preferences with your SW/CM? Yes     If you are discharging home, do you have help at home? Yes     Do you think you will need help additional at home at discharge? No     Do you currently have difficulty keeping up with bills, affording medicine or buying food? No    Assigned SW/CM notified of any patient/family needs or concerns. Appropriate resources provided to address patient's needs.

## 2023-09-12 NOTE — SUBJECTIVE & OBJECTIVE
Past Medical History:   Diagnosis Date    *Atrial fibrillation     Heart disease     Hypertension     Pacemaker 11/25/2013    S/P Mechanical AVR 11/25/2013    S/P thoracic aortic aneurysm repair with Dacron graft (2007) 11/25/2013    S/P VSD surgical patch repair -1997 11/25/2013       Past Surgical History:   Procedure Laterality Date    ASCENDING AORTIC ANEURYSM REPAIR      BREAST SURGERY      CARDIAC CATHETERIZATION      CARDIAC PACEMAKER PLACEMENT      HYSTERECTOMY      MECHANICAL AORTIC VALVE REPLACEMENT         Review of patient's allergies indicates:   Allergen Reactions    Iodine Anaphylaxis     contrast    Lidocaine Anaphylaxis     cetacaine spray       No current facility-administered medications on file prior to encounter.     Current Outpatient Medications on File Prior to Encounter   Medication Sig    acetaminophen (TYLENOL) 500 MG tablet Take 1,000 mg by mouth daily as needed (pain/fever).    ALBUTEROL SULFATE (VENTOLIN HFA INHL) Inhale 1 puff into the lungs as needed (wheezing/shortness of breath).    alprazolam (XANAX) 0.25 MG tablet Take 0.25 mg by mouth 2 (two) times daily as needed for Anxiety.    amLODIPine (NORVASC) 5 MG tablet TAKE ONE TABLET BY MOUTH once a day AT NIGHT    cefadroxil (DURICEF) 500 MG Cap Take 2 capsules (1 g total) by mouth every 12 (twelve) hours.    EScitalopram oxalate (LEXAPRO) 10 MG tablet Take by mouth.    loratadine (CLARITIN) 10 mg tablet TAKE ONE TABLET BY MOUTH DAILY AS NEEDED FOR ALLERGIES    metoprolol succinate (TOPROL-XL) 50 MG 24 hr tablet Take 1 tablet every Morning and take 2 tablets in the evening    ofloxacin (FLOXIN) 0.3 % otic solution 5 drops once daily.    potassium chloride (MICRO-K) 10 MEQ CpSR Take 10 mEq by mouth 2 (two) times daily.     prednisoLONE acetate (PRED FORTE) 1 % DrpS Place 1 drop into the right eye 4 (four) times daily.    sotaloL (BETAPACE) 80 MG tablet Take 1 tablet (80 mg total) by mouth 2 (two) times daily. Please HOLD taking Sotalol  until follow up with cardiologist outpatient.    valACYclovir (VALTREX) 1000 MG tablet Take 1 tablet (1,000 mg total) by mouth 2 (two) times daily. for 5 days    valsartan (DIOVAN) 320 MG tablet Take 160 mg by mouth every 12 (twelve) hours. 1/2 in the morning 1/2 at night    warfarin (COUMADIN) 5 MG tablet On -, take 1.5 tablets (7.5 mg) by mouth daily. Starting , take 1 tablet (5 mg) by mouth daily or as directed by coumadin clinic    zolpidem (AMBIEN) 10 mg Tab Take 10 mg by mouth every evening.     Family History       Problem Relation (Age of Onset)    Arrhythmia Mother    Heart disease Mother, Father    Heart failure Mother    Hypertension Mother, Father          Tobacco Use    Smoking status: Former     Current packs/day: 0.00     Types: Cigarettes     Quit date: 2009     Years since quittin.8    Smokeless tobacco: Never   Substance and Sexual Activity    Alcohol use: No    Drug use: No    Sexual activity: Not on file     Review of Systems   Cardiovascular:  Negative for chest pain, irregular heartbeat, leg swelling, orthopnea and palpitations.   Respiratory:  Positive for cough. Negative for shortness of breath.      Objective:     Vital Signs (Most Recent):  Temp: 98 °F (36.7 °C) (23 0300)  Pulse: 69 (23 0800)  Resp: 18 (23 0800)  BP: (!) 97/52 (23 0800)  SpO2: 95 % (23 0800) Vital Signs (24h Range):  Temp:  [97.5 °F (36.4 °C)-98 °F (36.7 °C)] 98 °F (36.7 °C)  Pulse:  [65-75] 69  Resp:  [15-44] 18  SpO2:  [93 %-99 %] 95 %  BP: ()/(51-77) 97/52     Weight: 68 kg (150 lb)  Body mass index is 26.57 kg/m².    SpO2: 95 %         Intake/Output Summary (Last 24 hours) at 2023 0838  Last data filed at 2023 1600  Gross per 24 hour   Intake 234.05 ml   Output --   Net 234.05 ml       Lines/Drains/Airways       Peripheral Intravenous Line  Duration                  Peripheral IV - Single Lumen 23 1913 20 G Distal;Left;Posterior Forearm 2 days          Peripheral IV - Single Lumen 09/09/23 2300 18 G Left Antecubital 2 days                     Physical Exam  Vitals and nursing note reviewed.   Constitutional:       General: She is not in acute distress.     Appearance: Normal appearance. She is not toxic-appearing.   HENT:      Head: Normocephalic and atraumatic.   Eyes:      Extraocular Movements: Extraocular movements intact.      Pupils: Pupils are equal, round, and reactive to light.   Cardiovascular:      Rate and Rhythm: Normal rate. Rhythm irregularly irregular.      Heart sounds: Murmur heard.      Comments: Mechanical S2  Pulmonary:      Effort: Pulmonary effort is normal. No respiratory distress.      Breath sounds: Normal breath sounds. No wheezing or rales.   Abdominal:      General: Abdomen is flat. There is no distension.      Palpations: Abdomen is soft.   Musculoskeletal:      Cervical back: Normal range of motion and neck supple.      Right lower leg: No edema.      Left lower leg: No edema.   Skin:     General: Skin is warm and dry.   Neurological:      General: No focal deficit present.      Mental Status: She is alert.   Psychiatric:         Mood and Affect: Mood normal.         Behavior: Behavior normal.          Significant Labs: All pertinent lab results from the last 24 hours have been reviewed.    Significant Imaging: Echocardiogram: Transthoracic echo (TTE) complete (Cupid Only):   Results for orders placed or performed during the hospital encounter of 09/09/23   Echo   Result Value Ref Range    Ascending aorta 3.66 cm    STJ 2.61 cm    AV mean gradient 12 mmHg    Ao peak shaun 2.55 m/s    Ao VTI 55.88 cm    IVRT 51.38 msec    IVS 0.74 0.6 - 1.1 cm    LA size 2.67 cm    Left Atrium Major Axis 4.62 cm    Left Atrium Minor Axis 4.41 cm    LVIDd 4.79 3.5 - 6.0 cm    LVIDs 3.57 2.1 - 4.0 cm    LVOT diameter 1.89 cm    LVOT peak VTI 41.05 cm    Posterior Wall 1.17 (A) 0.6 - 1.1 cm    MV Peak A Shaun 0.61 m/s    E wave deceleration time 130.35  msec    MV Peak E Shaun 0.99 m/s    RA Major Axis 4.57 cm    RA Width 3.98 cm    RVDD 3.53 cm    Sinus 2.91 cm    TAPSE 1.75 cm    TR Max Shaun 2.31 m/s    LA WIDTH 4.30 cm    MV stenosis pressure 1/2 time 37.80 ms    LV Diastolic Volume 106.88 mL    LV Systolic Volume 53.27 mL    LVOT peak shaun 1.75 m/s    TDI LATERAL 0.09 m/s    TDI SEPTAL 0.06 m/s    LA volume (mod) 75.83 cm3    LV LATERAL E/E' RATIO 11.00 m/s    LV SEPTAL E/E' RATIO 16.50 m/s    FS 25 %    LA volume 44.04 cm3    LV mass 159.40 g    ZLVIDD 0.09     ZLVIDS 1.53     Left Ventricle Relative Wall Thickness 0.49 cm    AV valve area 2.06 cm²    AV Velocity Ratio 0.69     AV index (prosthetic) 0.73     MV valve area p 1/2 method 5.82 cm2    E/A ratio 1.62     Mean e' 0.08 m/s    LVOT area 2.8 cm2    LVOT stroke volume 115.11 cm3    AV peak gradient 26 mmHg    E/E' ratio 13.20 m/s    LV Systolic Volume Index 31.2 mL/m2    LV Diastolic Volume Index 62.50 mL/m2    LA Volume Index 25.8 mL/m2    LV Mass Index 93 g/m2    Triscuspid Valve Regurgitation Peak Gradient 21 mmHg    LA Volume Index (Mod) 44.3 mL/m2    JAYASHREE by Velocity Ratio 1.92 cm²    BSA 1.74 m2    EF 55 %    TV resting pulmonary artery pressure 36 mmHg    RV TB RVSP 17 mmHg    Est. RA pres 15 mmHg    Narrative      Left Ventricle: The left ventricle is normal in size. Ventricular mass   is normal. Increased wall thickness. There is concentric remodeling.   regional wall motion abnormalities present. Septal motion is consistent   with pacing. There is low normal systolic function. Ejection fraction by   visual approximation is 55%. There is indeterminate diastolic function.    Right Ventricle: Normal right ventricular cavity size. Wall thickness   is normal. Catheter. Right ventricle wall motion  is normal. Systolic   function is normal.    Aortic Valve: There is a mechanical valve in the aortic position.   Aortic valve area by VTI is 2.06 cm². Aortic valve peak velocity is 2.55   m/s. Mean gradient is  12 mmHg. The dimensionless index is 0.73. There is   trace aortic regurgitation.    Mitral Valve: There is mild bileaflet sclerosis. There is mild mitral   annular calcification present.    Pulmonary Artery: The estimated pulmonary artery systolic pressure is   36 mmHg.    IVC/SVC: Elevated venous pressure at 15 mmHg.

## 2023-09-12 NOTE — PROGRESS NOTES
[facility] ([place] [address])  Cardiology  Progress Note    Patient Name: Naina Keyes  MRN: [id number]  Admission Date: 9/9/2023  Hospital Length of Stay: 3 days  Code Status: Full Code   Attending Physician: Shaye Tucker MD   Primary Care Physician: Anjana Torres NP  Expected Discharge Date: 9/13/2023  Principal Problem:Atrial fibrillation with RVR    Subjective:     Hospital Course:   No notes on file    Past Medical History:   Diagnosis Date    *Atrial fibrillation     Heart disease     Hypertension     Pacemaker 11/25/2013    S/P Mechanical AVR 11/25/2013    S/P thoracic aortic aneurysm repair with Dacron graft (2007) 11/25/2013    S/P VSD surgical patch repair -1997 11/25/2013       Past Surgical History:   Procedure Laterality Date    ASCENDING AORTIC ANEURYSM REPAIR      BREAST SURGERY      CARDIAC CATHETERIZATION      CARDIAC PACEMAKER PLACEMENT      HYSTERECTOMY      MECHANICAL AORTIC VALVE REPLACEMENT         Review of patient's allergies indicates:   Allergen Reactions    Iodine Anaphylaxis     contrast    Lidocaine Anaphylaxis     cetacaine spray       No current facility-administered medications on file prior to encounter.     Current Outpatient Medications on File Prior to Encounter   Medication Sig    acetaminophen (TYLENOL) 500 MG tablet Take 1,000 mg by mouth daily as needed (pain/fever).    ALBUTEROL SULFATE (VENTOLIN HFA INHL) Inhale 1 puff into the lungs as needed (wheezing/shortness of breath).    alprazolam (XANAX) 0.25 MG tablet Take 0.25 mg by mouth 2 (two) times daily as needed for Anxiety.    amLODIPine (NORVASC) 5 MG tablet TAKE ONE TABLET BY MOUTH once a day AT NIGHT    cefadroxil (DURICEF) 500 MG Cap Take 2 capsules (1 g total) by mouth every 12 (twelve) hours.    EScitalopram oxalate (LEXAPRO) 10 MG tablet Take by mouth.    loratadine (CLARITIN) 10 mg tablet TAKE ONE TABLET BY MOUTH DAILY AS NEEDED FOR ALLERGIES    metoprolol succinate (TOPROL-XL) 50 MG 24 hr  tablet Take 1 tablet every Morning and take 2 tablets in the evening    ofloxacin (FLOXIN) 0.3 % otic solution 5 drops once daily.    potassium chloride (MICRO-K) 10 MEQ CpSR Take 10 mEq by mouth 2 (two) times daily.     prednisoLONE acetate (PRED FORTE) 1 % DrpS Place 1 drop into the right eye 4 (four) times daily.    sotaloL (BETAPACE) 80 MG tablet Take 1 tablet (80 mg total) by mouth 2 (two) times daily. Please HOLD taking Sotalol until follow up with cardiologist outpatient.    valACYclovir (VALTREX) 1000 MG tablet Take 1 tablet (1,000 mg total) by mouth 2 (two) times daily. for 5 days    valsartan (DIOVAN) 320 MG tablet Take 160 mg by mouth every 12 (twelve) hours. 1/2 in the morning 1/2 at night    warfarin (COUMADIN) 5 MG tablet On -, take 1.5 tablets (7.5 mg) by mouth daily. Starting , take 1 tablet (5 mg) by mouth daily or as directed by coumadin clinic    zolpidem (AMBIEN) 10 mg Tab Take 10 mg by mouth every evening.     Family History       Problem Relation (Age of Onset)    Arrhythmia Mother    Heart disease Mother, Father    Heart failure Mother    Hypertension Mother, Father          Tobacco Use    Smoking status: Former     Current packs/day: 0.00     Types: Cigarettes     Quit date: 2009     Years since quittin.8    Smokeless tobacco: Never   Substance and Sexual Activity    Alcohol use: No    Drug use: No    Sexual activity: Not on file     Review of Systems   Cardiovascular:  Negative for chest pain, irregular heartbeat, leg swelling, orthopnea and palpitations.   Respiratory:  Positive for cough. Negative for shortness of breath.      Objective:     Vital Signs (Most Recent):  Temp: 98 °F (36.7 °C) (23 0300)  Pulse: 69 (23 0800)  Resp: 18 (23 0800)  BP: (!) 97/52 (23 0800)  SpO2: 95 % (23 0800) Vital Signs (24h Range):  Temp:  [97.5 °F (36.4 °C)-98 °F (36.7 °C)] 98 °F (36.7 °C)  Pulse:  [65-75] 69  Resp:  [15-44] 18  SpO2:  [93 %-99 %] 95 %  BP:  ()/(51-77) 97/52     Weight: 68 kg (150 lb)  Body mass index is 26.57 kg/m².    SpO2: 95 %         Intake/Output Summary (Last 24 hours) at 9/12/2023 0838  Last data filed at 9/11/2023 1600  Gross per 24 hour   Intake 234.05 ml   Output --   Net 234.05 ml       Lines/Drains/Airways       Peripheral Intravenous Line  Duration                  Peripheral IV - Single Lumen 09/09/23 1913 20 G Distal;Left;Posterior Forearm 2 days         Peripheral IV - Single Lumen 09/09/23 2300 18 G Left Antecubital 2 days                     Physical Exam  Vitals and nursing note reviewed.   Constitutional:       General: She is not in acute distress.     Appearance: Normal appearance. She is not toxic-appearing.   HENT:      Head: Normocephalic and atraumatic.   Eyes:      Extraocular Movements: Extraocular movements intact.      Pupils: Pupils are equal, round, and reactive to light.   Cardiovascular:      Rate and Rhythm: Normal rate. Rhythm irregularly irregular.      Heart sounds: Murmur heard.      Comments: Mechanical S2  Pulmonary:      Effort: Pulmonary effort is normal. No respiratory distress.      Breath sounds: Normal breath sounds. No wheezing or rales.   Abdominal:      General: Abdomen is flat. There is no distension.      Palpations: Abdomen is soft.   Musculoskeletal:      Cervical back: Normal range of motion and neck supple.      Right lower leg: No edema.      Left lower leg: No edema.   Skin:     General: Skin is warm and dry.   Neurological:      General: No focal deficit present.      Mental Status: She is alert.   Psychiatric:         Mood and Affect: Mood normal.         Behavior: Behavior normal.          Significant Labs: All pertinent lab results from the last 24 hours have been reviewed.    Significant Imaging: Echocardiogram: Transthoracic echo (TTE) complete (Cupid Only):   Results for orders placed or performed during the hospital encounter of 09/09/23   Echo   Result Value Ref Range    Ascending  aorta 3.66 cm    STJ 2.61 cm    AV mean gradient 12 mmHg    Ao peak shaun 2.55 m/s    Ao VTI 55.88 cm    IVRT 51.38 msec    IVS 0.74 0.6 - 1.1 cm    LA size 2.67 cm    Left Atrium Major Axis 4.62 cm    Left Atrium Minor Axis 4.41 cm    LVIDd 4.79 3.5 - 6.0 cm    LVIDs 3.57 2.1 - 4.0 cm    LVOT diameter 1.89 cm    LVOT peak VTI 41.05 cm    Posterior Wall 1.17 (A) 0.6 - 1.1 cm    MV Peak A Shaun 0.61 m/s    E wave deceleration time 130.35 msec    MV Peak E Shaun 0.99 m/s    RA Major Axis 4.57 cm    RA Width 3.98 cm    RVDD 3.53 cm    Sinus 2.91 cm    TAPSE 1.75 cm    TR Max Shaun 2.31 m/s    LA WIDTH 4.30 cm    MV stenosis pressure 1/2 time 37.80 ms    LV Diastolic Volume 106.88 mL    LV Systolic Volume 53.27 mL    LVOT peak shaun 1.75 m/s    TDI LATERAL 0.09 m/s    TDI SEPTAL 0.06 m/s    LA volume (mod) 75.83 cm3    LV LATERAL E/E' RATIO 11.00 m/s    LV SEPTAL E/E' RATIO 16.50 m/s    FS 25 %    LA volume 44.04 cm3    LV mass 159.40 g    ZLVIDD 0.09     ZLVIDS 1.53     Left Ventricle Relative Wall Thickness 0.49 cm    AV valve area 2.06 cm²    AV Velocity Ratio 0.69     AV index (prosthetic) 0.73     MV valve area p 1/2 method 5.82 cm2    E/A ratio 1.62     Mean e' 0.08 m/s    LVOT area 2.8 cm2    LVOT stroke volume 115.11 cm3    AV peak gradient 26 mmHg    E/E' ratio 13.20 m/s    LV Systolic Volume Index 31.2 mL/m2    LV Diastolic Volume Index 62.50 mL/m2    LA Volume Index 25.8 mL/m2    LV Mass Index 93 g/m2    Triscuspid Valve Regurgitation Peak Gradient 21 mmHg    LA Volume Index (Mod) 44.3 mL/m2    JAYASHREE by Velocity Ratio 1.92 cm²    BSA 1.74 m2    EF 55 %    TV resting pulmonary artery pressure 36 mmHg    RV TB RVSP 17 mmHg    Est. RA pres 15 mmHg    Narrative      Left Ventricle: The left ventricle is normal in size. Ventricular mass   is normal. Increased wall thickness. There is concentric remodeling.   regional wall motion abnormalities present. Septal motion is consistent   with pacing. There is low normal systolic  function. Ejection fraction by   visual approximation is 55%. There is indeterminate diastolic function.    Right Ventricle: Normal right ventricular cavity size. Wall thickness   is normal. Catheter. Right ventricle wall motion  is normal. Systolic   function is normal.    Aortic Valve: There is a mechanical valve in the aortic position.   Aortic valve area by VTI is 2.06 cm². Aortic valve peak velocity is 2.55   m/s. Mean gradient is 12 mmHg. The dimensionless index is 0.73. There is   trace aortic regurgitation.    Mitral Valve: There is mild bileaflet sclerosis. There is mild mitral   annular calcification present.    Pulmonary Artery: The estimated pulmonary artery systolic pressure is   36 mmHg.    IVC/SVC: Elevated venous pressure at 15 mmHg.       Assessment and Plan:     * Atrial fibrillation with RVR  Patient with paroxysmal atrial fibrillation that was triggered by decompensated HFpEF. Patient on anticoagulation with coumadin for mechanical  AVR.    PLAN:  - Continue coumadin  - Can continue metoprolol succinate 100mg daily   - Monitor electrolytes closely. Maintain Mg >2 and K >4    Acute decompensated heart failure  Patient presented in decompensated HFpEF and atrial fibrillation after significant salt intake. BNP 1157. TTE shows EF 55%, indeterminate diastolic function, PASP 36 and IVC 15mmHg.    Appears euvolemic on exam today with no significant JVD or symptoms of volume overload.    PLAN:  - may hold further diuresis  - may initiate SGLT-I  - may prescribe furosemide PO as needed  - Sodium restriction <2 g  - Fluid restriction < 1500 mL  - Strict I/Os  - Daily weights  - maintain K > 4 and mag > 2    S/P Mechanical AVR secondary to bacterial endocarditis -1997, [facility] valve  -Continue coumadin 2.5-3.5 goal         VTE Risk Mitigation (From admission, onward)           Ordered     heparin 25,000 units in dextrose 5% (100 units/ml) IV bolus from bag - ADDITIONAL PRN BOLUS - 60 units/kg  As needed  (PRN)        Question:  Heparin Infusion Adjustment (DO NOT MODIFY ANSWER)  Answer:  [email or web address]\epic\Images\Pharmacy\HeparinInfusions\heparin LOW INTENSITY nomogram for [facility] [id number]    09/12/23 0926     heparin 25,000 units in dextrose 5% (100 units/ml) IV bolus from bag - ADDITIONAL PRN BOLUS - 30 units/kg  As needed (PRN)        Question:  Heparin Infusion Adjustment (DO NOT MODIFY ANSWER)  Answer:  \\[email or web address]\epic\Images\Pharmacy\HeparinInfusions\heparin LOW INTENSITY nomogram for [facility] [id number].pdf    09/12/23 0926     heparin 25,000 units in dextrose 5% 250 mL (100 units/mL) infusion LOW INTENSITY nomogram - [facility]  Continuous        Question Answer Comment   Heparin Infusion Adjustment (DO NOT MODIFY ANSWER) \\[email or web address]\epic\Images\Pharmacy\HeparinInfusions\heparin LOW INTENSITY nomogram for [facility] [id number]    Begin at (in units/kg/hr) 12        09/12/23 0926     warfarin (COUMADIN) tablet 7.5 mg  Daily         09/11/23 1534                    Anuja Stewart DO  Cardiology  [facility] ([place]

## 2023-09-12 NOTE — ASSESSMENT & PLAN NOTE
Patient with paroxysmal atrial fibrillation that was triggered by decompensated HFpEF. Patient on anticoagulation with coumadin for mechanical  AVR.    PLAN:  - Continue coumadin  - Can continue metoprolol succinate 100mg daily   - Monitor electrolytes closely. Maintain Mg >2 and K >4

## 2023-09-12 NOTE — ASSESSMENT & PLAN NOTE
Patient with recent onset of orthopnea, increased cough with laying back. Patient is not on a home diuretic regimen, nor does she believe she has been diagnosed with heart failure. She has an extensive cardiac history, however, and states she may have coded on a procedure table 30+ years ago when her initial AVR was placed.    TTE (09/2023) EF 55%  Home meds: Valsartan, Toprol  Dry weight: Unknown  Diagnostics: CXR coarse interstitial opacities relatively stable, no pleural effusion; BNP 1000+; Lactate 1.3; O2 requirement: None    -- Aggressive diuresis: Lasix 40 qd; titrate to clinical response and UOP goal  -- Strict I/Os; goal net negative 1.5 - 2 L / day  -- Daily weights (standing if tolerated)  -- Fluid restriction at 1500mL  -- Cardiac diet w/ 2 g Na restriction  -- GDMT held s/o active GI bleed and normal pressures; will restart when clinically appropriate  -- Repeat TTE completed 9/11/23, EF 55%

## 2023-09-12 NOTE — ASSESSMENT & PLAN NOTE
Patient with Paroxysmal (<7 days) atrial fibrillation which is uncontrolled currently with Beta Blocker. Patient is currently in atrial fibrillation.[id number] Score: 1. HASBLED Score: 2. Anticoagulation indicated. Anticoagulation done with Heparin bridge to Warfarin.    · Initially admitted to MICU with afib with RVR  · Started on a Dilt gtt with no improvement in HR  · Cardiology consulted and started on an Amio gtt with improvement in HR  · Continue Metoprolol monotherapy - Per Cards PO Amio not needed  · Continue Heparin bridge to Warfarin  · PharmD consulted for Warfarin:  Goal INR unclear - either 2-3 or 2.5-3.5.  Appreciate assistance in finding appropriate range

## 2023-09-12 NOTE — NURSING
Nurses Note -- 4 Eyes      9/12/2023   10:27 AM      Skin assessed during: Transfer      [x] No Altered Skin Integrity Present    [x]Prevention Measures Documented      [] Yes- Altered Skin Integrity Present or Discovered   [] LDA Added if Not in Epic (Describe Wound)   [] New Altered Skin Integrity was Present on Admit and Documented in LDA   [] Wound Image Taken    Wound Care Consulted? Yes    Attending Nurse:  Martha Harrison RN/Staff Member:   Gris

## 2023-09-12 NOTE — ASSESSMENT & PLAN NOTE
Symptoms: 3x witnessed melenic stools while inpatient, epigastric pain  Diagnostics: Hgb 12.5 (baseline 11-12s), MCV 100s, , Coag INR 2.8  APT / AC: Warfarin for aortic mechanical valve    -- GI consult for EGD / C-Scope consult for source evaluation +/- intervention  -- IV Protonix 80mg x 1, followed by IV 40mg BID thereafter  -- NPO  -- Trend CBCs ; Transfuse Hgb < 7  -- Resuscitate IVF PRN  -- Cardiac telemetry  -- Maintain 2 large bore IVs  -- If hemodynamically unstable +/- new bleed, stat CTA / consult IR for embolization evaluation  -- Warfarin resumed after negative EGD with heparin bridge

## 2023-09-12 NOTE — ASSESSMENT & PLAN NOTE
· On Heparin bridge to Warfarin for mechanical valves, grafts, and afib  · Goal INR either 2-3 or 2.5-3.5 - PharmD working on finding right INR  · PharmD consult for dosing

## 2023-09-12 NOTE — ASSESSMENT & PLAN NOTE
· Follows with Dr. Gallegos of Interventional Cardiology  · Continue Heparin bridge to Warfarin as above  · Continue suppressive therapy with Cefadroxil 1g BID

## 2023-09-12 NOTE — NURSING
Nurses Note -- 4 Eyes      9/12/2023   11:02 AM      Skin assessed during: Transfer      [x] No Altered Skin Integrity Present    [x]Prevention Measures Documented      [] Yes- Altered Skin Integrity Present or Discovered   [] LDA Added if Not in Epic (Describe Wound)   [] New Altered Skin Integrity was Present on Admit and Documented in LDA   [] Wound Image Taken    Wound Care Consulted? No    Attending Nurse:  Bernadette Harrison RN/Staff Member:   Salena

## 2023-09-12 NOTE — SUBJECTIVE & OBJECTIVE
Interval History/Significant Events: No acute overnight events    Review of Systems   Constitutional:  Negative for activity change, chills and fever.   HENT:  Negative for nosebleeds and sore throat.    Eyes:  Negative for discharge and redness.   Respiratory:  Positive for cough. Negative for chest tightness and shortness of breath.    Cardiovascular:  Negative for chest pain, palpitations and leg swelling.   Gastrointestinal:  Negative for abdominal pain, nausea and vomiting.   Endocrine: Negative for polydipsia, polyphagia and polyuria.   Genitourinary:  Negative for difficulty urinating and hematuria.   Musculoskeletal:  Negative for arthralgias and neck stiffness.   Skin:  Negative for pallor, rash and wound.   Allergic/Immunologic: Negative for environmental allergies and food allergies.   Neurological:  Negative for dizziness and light-headedness.   Hematological:  Negative for adenopathy. Does not bruise/bleed easily.   Psychiatric/Behavioral:  Negative for agitation and behavioral problems.    All other systems reviewed and are negative.    Objective:     Vital Signs (Most Recent):  Temp: 97.8 °F (36.6 °C) (09/12/23 0705)  Pulse: 67 (09/12/23 1115)  Resp: 19 (09/12/23 0900)  BP: 133/62 (09/12/23 0900)  SpO2: 96 % (09/12/23 0900) Vital Signs (24h Range):  Temp:  [97.5 °F (36.4 °C)-98 °F (36.7 °C)] 97.8 °F (36.6 °C)  Pulse:  [67-79] 67  Resp:  [15-44] 19  SpO2:  [93 %-99 %] 96 %  BP: ()/(51-77) 133/62   Weight: 68 kg (150 lb)  Body mass index is 26.57 kg/m².      Intake/Output Summary (Last 24 hours) at 9/12/2023 1324  Last data filed at 9/11/2023 1600  Gross per 24 hour   Intake 153.9 ml   Output --   Net 153.9 ml          Physical Exam  Vitals and nursing note reviewed. Exam conducted with a chaperone present.   Constitutional:       Appearance: Normal appearance.   Cardiovascular:      Rate and Rhythm: Normal rate and regular rhythm.   Pulmonary:      Effort: Pulmonary effort is normal.      Breath  sounds: Normal breath sounds.   Abdominal:      Palpations: Abdomen is soft.      Tenderness: There is no abdominal tenderness. There is no guarding or rebound.   Skin:     General: Skin is warm and dry.   Neurological:      General: No focal deficit present.      Mental Status: She is alert and oriented to person, place, and time.            Vents:     Lines/Drains/Airways       Peripheral Intravenous Line  Duration                  Peripheral IV - Single Lumen 09/09/23 2300 18 G Left Antecubital 2 days                  Significant Labs:    CBC/Anemia Profile:  Recent Labs   Lab 09/11/23  0837 09/11/23  2033 09/12/23  1042   WBC 7.84 8.09 9.25   HGB 12.5 12.9 13.5   HCT 39.4 35.2* 39.7    225 244   * 98 102*   RDW 14.5 14.7* 14.7*        Chemistries:  Recent Labs   Lab 09/11/23  0348 09/12/23  0422    138   K 3.1* 3.5    103   CO2 26 23   BUN 20 25*   CREATININE 1.0 1.0   CALCIUM 8.9 9.0   ALBUMIN 3.6 3.5   PROT 6.3 6.4   BILITOT 1.9* 1.2*   ALKPHOS 93 105   ALT 27 25   AST 28 26   MG 1.9 2.1   PHOS 2.4* 3.1       All pertinent labs within the past 24 hours have been reviewed.    Significant Imaging:  I have reviewed all pertinent imaging results/findings within the past 24 hours.

## 2023-09-12 NOTE — ASSESSMENT & PLAN NOTE
· While in ICU had melanotic stools  · Hgb remains stable, no transfusions required  · GI consulted  · EGD 9/11 with erosive gastropathy with no bleeding and no stigmata of recent bleeding or ulcer  · Continue PPI daily

## 2023-09-12 NOTE — ASSESSMENT & PLAN NOTE
Patient presented in decompensated HFpEF and atrial fibrillation after significant salt intake. BNP 1157. TTE shows EF 55%, indeterminate diastolic function, PASP 36 and IVC 15mmHg.    Appears euvolemic on exam today with no significant JVD or symptoms of volume overload.    PLAN:  - may hold further diuresis  - may initiate SGLT-I  - may prescribe furosemide PO as needed  - Sodium restriction <2 g  - Fluid restriction < 1500 mL  - Strict I/Os  - Daily weights  - maintain K > 4 and mag > 2

## 2023-09-13 LAB
ALBUMIN SERPL BCP-MCNC: 3.4 G/DL (ref 3.5–5.2)
ALP SERPL-CCNC: 88 U/L (ref 55–135)
ALT SERPL W/O P-5'-P-CCNC: 21 U/L (ref 10–44)
ANION GAP SERPL CALC-SCNC: 12 MMOL/L (ref 8–16)
APTT PPP: 53 SEC (ref 21–32)
APTT PPP: 53.2 SEC (ref 21–32)
AST SERPL-CCNC: 20 U/L (ref 10–40)
BASOPHILS # BLD AUTO: 0.07 K/UL (ref 0–0.2)
BASOPHILS # BLD AUTO: 0.07 K/UL (ref 0–0.2)
BASOPHILS NFR BLD: 0.9 % (ref 0–1.9)
BASOPHILS NFR BLD: 1 % (ref 0–1.9)
BILIRUB SERPL-MCNC: 0.7 MG/DL (ref 0.1–1)
BUN SERPL-MCNC: 20 MG/DL (ref 8–23)
CALCIUM SERPL-MCNC: 9.1 MG/DL (ref 8.7–10.5)
CHLORIDE SERPL-SCNC: 101 MMOL/L (ref 95–110)
CO2 SERPL-SCNC: 25 MMOL/L (ref 23–29)
CREAT SERPL-MCNC: 0.9 MG/DL (ref 0.5–1.4)
DIFFERENTIAL METHOD: ABNORMAL
DIFFERENTIAL METHOD: ABNORMAL
EOSINOPHIL # BLD AUTO: 0.3 K/UL (ref 0–0.5)
EOSINOPHIL # BLD AUTO: 0.4 K/UL (ref 0–0.5)
EOSINOPHIL NFR BLD: 4.9 % (ref 0–8)
EOSINOPHIL NFR BLD: 4.9 % (ref 0–8)
ERYTHROCYTE [DISTWIDTH] IN BLOOD BY AUTOMATED COUNT: 14.3 % (ref 11.5–14.5)
ERYTHROCYTE [DISTWIDTH] IN BLOOD BY AUTOMATED COUNT: 14.9 % (ref 11.5–14.5)
EST. GFR  (NO RACE VARIABLE): >60 ML/MIN/1.73 M^2
GLUCOSE SERPL-MCNC: 107 MG/DL (ref 70–110)
HCT VFR BLD AUTO: 35.3 % (ref 37–48.5)
HCT VFR BLD AUTO: 35.5 % (ref 37–48.5)
HGB BLD-MCNC: 11.8 G/DL (ref 12–16)
HGB BLD-MCNC: 12.5 G/DL (ref 12–16)
IMM GRANULOCYTES # BLD AUTO: 0.01 K/UL (ref 0–0.04)
IMM GRANULOCYTES # BLD AUTO: 0.01 K/UL (ref 0–0.04)
IMM GRANULOCYTES NFR BLD AUTO: 0.1 % (ref 0–0.5)
IMM GRANULOCYTES NFR BLD AUTO: 0.1 % (ref 0–0.5)
INR PPP: 2 (ref 0.8–1.2)
LYMPHOCYTES # BLD AUTO: 1.4 K/UL (ref 1–4.8)
LYMPHOCYTES # BLD AUTO: 1.9 K/UL (ref 1–4.8)
LYMPHOCYTES NFR BLD: 21.3 % (ref 18–48)
LYMPHOCYTES NFR BLD: 24.5 % (ref 18–48)
MAGNESIUM SERPL-MCNC: 2.2 MG/DL (ref 1.6–2.6)
MCH RBC QN AUTO: 32.2 PG (ref 27–31)
MCH RBC QN AUTO: 35.8 PG (ref 27–31)
MCHC RBC AUTO-ENTMCNC: 33.4 G/DL (ref 32–36)
MCHC RBC AUTO-ENTMCNC: 35.2 G/DL (ref 32–36)
MCV RBC AUTO: 102 FL (ref 82–98)
MCV RBC AUTO: 96 FL (ref 82–98)
MONOCYTES # BLD AUTO: 0.7 K/UL (ref 0.3–1)
MONOCYTES # BLD AUTO: 0.9 K/UL (ref 0.3–1)
MONOCYTES NFR BLD: 10.1 % (ref 4–15)
MONOCYTES NFR BLD: 11.4 % (ref 4–15)
NEUTROPHILS # BLD AUTO: 4.2 K/UL (ref 1.8–7.7)
NEUTROPHILS # BLD AUTO: 4.6 K/UL (ref 1.8–7.7)
NEUTROPHILS NFR BLD: 58.2 % (ref 38–73)
NEUTROPHILS NFR BLD: 62.6 % (ref 38–73)
NRBC BLD-RTO: 0 /100 WBC
NRBC BLD-RTO: 0 /100 WBC
PHOSPHATE SERPL-MCNC: 3.1 MG/DL (ref 2.7–4.5)
PLATELET # BLD AUTO: 230 K/UL (ref 150–450)
PLATELET # BLD AUTO: 238 K/UL (ref 150–450)
PLATELET BLD QL SMEAR: ABNORMAL
PMV BLD AUTO: 10 FL (ref 9.2–12.9)
PMV BLD AUTO: 10.1 FL (ref 9.2–12.9)
POTASSIUM SERPL-SCNC: 3.2 MMOL/L (ref 3.5–5.1)
PROT SERPL-MCNC: 6.1 G/DL (ref 6–8.4)
PROTHROMBIN TIME: 20.4 SEC (ref 9–12.5)
RBC # BLD AUTO: 3.49 M/UL (ref 4–5.4)
RBC # BLD AUTO: 3.66 M/UL (ref 4–5.4)
SODIUM SERPL-SCNC: 138 MMOL/L (ref 136–145)
WBC # BLD AUTO: 6.72 K/UL (ref 3.9–12.7)
WBC # BLD AUTO: 7.89 K/UL (ref 3.9–12.7)

## 2023-09-13 PROCEDURE — 63600175 PHARM REV CODE 636 W HCPCS: Mod: UD

## 2023-09-13 PROCEDURE — 85610 PROTHROMBIN TIME: CPT

## 2023-09-13 PROCEDURE — 25000003 PHARM REV CODE 250: Performed by: STUDENT IN AN ORGANIZED HEALTH CARE EDUCATION/TRAINING PROGRAM

## 2023-09-13 PROCEDURE — 25000003 PHARM REV CODE 250: Performed by: HOSPITALIST

## 2023-09-13 PROCEDURE — 20600001 HC STEP DOWN PRIVATE ROOM

## 2023-09-13 PROCEDURE — 36415 COLL VENOUS BLD VENIPUNCTURE: CPT

## 2023-09-13 PROCEDURE — 36415 COLL VENOUS BLD VENIPUNCTURE: CPT | Performed by: INTERNAL MEDICINE

## 2023-09-13 PROCEDURE — 85730 THROMBOPLASTIN TIME PARTIAL: CPT | Mod: 91

## 2023-09-13 PROCEDURE — A4216 STERILE WATER/SALINE, 10 ML: HCPCS | Performed by: ANESTHESIOLOGY

## 2023-09-13 PROCEDURE — 99232 PR SUBSEQUENT HOSPITAL CARE,LEVL II: ICD-10-PCS | Mod: ,,, | Performed by: HOSPITALIST

## 2023-09-13 PROCEDURE — 25000003 PHARM REV CODE 250

## 2023-09-13 PROCEDURE — 80053 COMPREHEN METABOLIC PANEL: CPT

## 2023-09-13 PROCEDURE — 85730 THROMBOPLASTIN TIME PARTIAL: CPT | Performed by: HOSPITALIST

## 2023-09-13 PROCEDURE — 85025 COMPLETE CBC W/AUTO DIFF WBC: CPT

## 2023-09-13 PROCEDURE — 85025 COMPLETE CBC W/AUTO DIFF WBC: CPT | Mod: 91 | Performed by: INTERNAL MEDICINE

## 2023-09-13 PROCEDURE — 99232 SBSQ HOSP IP/OBS MODERATE 35: CPT | Mod: ,,, | Performed by: HOSPITALIST

## 2023-09-13 PROCEDURE — 83735 ASSAY OF MAGNESIUM: CPT

## 2023-09-13 PROCEDURE — 25000003 PHARM REV CODE 250: Performed by: INTERNAL MEDICINE

## 2023-09-13 PROCEDURE — 84100 ASSAY OF PHOSPHORUS: CPT

## 2023-09-13 PROCEDURE — 25000003 PHARM REV CODE 250: Performed by: ANESTHESIOLOGY

## 2023-09-13 RX ORDER — POTASSIUM CHLORIDE 750 MG/1
30 CAPSULE, EXTENDED RELEASE ORAL EVERY 4 HOURS
Status: COMPLETED | OUTPATIENT
Start: 2023-09-13 | End: 2023-09-13

## 2023-09-13 RX ADMIN — METOPROLOL SUCCINATE 100 MG: 100 TABLET, EXTENDED RELEASE ORAL at 08:09

## 2023-09-13 RX ADMIN — WARFARIN SODIUM 7.5 MG: 7.5 TABLET ORAL at 05:09

## 2023-09-13 RX ADMIN — ESCITALOPRAM OXALATE 10 MG: 5 TABLET, FILM COATED ORAL at 09:09

## 2023-09-13 RX ADMIN — CEFADROXIL 1 G: 500 CAPSULE ORAL at 08:09

## 2023-09-13 RX ADMIN — Medication 3 ML: at 02:09

## 2023-09-13 RX ADMIN — POTASSIUM CHLORIDE 30 MEQ: 10 CAPSULE, COATED, EXTENDED RELEASE ORAL at 02:09

## 2023-09-13 RX ADMIN — PANTOPRAZOLE SODIUM 40 MG: 40 TABLET, DELAYED RELEASE ORAL at 08:09

## 2023-09-13 RX ADMIN — MUPIROCIN: 20 OINTMENT TOPICAL at 08:09

## 2023-09-13 RX ADMIN — HEPARIN SODIUM 12 UNITS/KG/HR: 10000 INJECTION, SOLUTION INTRAVENOUS at 09:09

## 2023-09-13 RX ADMIN — MUPIROCIN: 20 OINTMENT TOPICAL at 09:09

## 2023-09-13 RX ADMIN — POTASSIUM CHLORIDE 30 MEQ: 10 CAPSULE, COATED, EXTENDED RELEASE ORAL at 10:09

## 2023-09-13 RX ADMIN — CEFADROXIL 1 G: 500 CAPSULE ORAL at 09:09

## 2023-09-13 NOTE — PLAN OF CARE
Problem: Adult Inpatient Plan of Care  Goal: Plan of Care Review  Outcome: Ongoing, Progressing  Flowsheets (Taken 9/13/2023 0606)  Plan of Care Reviewed With: patient  Goal: Patient-Specific Goal (Individualized)  Outcome: Ongoing, Progressing  Flowsheets (Taken 9/13/2023 0606)  Anxieties, Fears or Concerns: ready to go home  Individualized Care Needs: none  Goal: Absence of Hospital-Acquired Illness or Injury  Outcome: Ongoing, Progressing  Goal: Optimal Comfort and Wellbeing  Outcome: Ongoing, Progressing     Problem: Infection  Goal: Absence of Infection Signs and Symptoms  Outcome: Ongoing, Progressing     Problem: Fall Injury Risk  Goal: Absence of Fall and Fall-Related Injury  Outcome: Ongoing, Progressing     Problem: Adjustment to Device (Cardiac Rhythm Management Device)  Goal: Optimal Adjustment to Device  Outcome: Ongoing, Progressing     Problem: Bleeding (Cardiac Rhythm Management Device)  Goal: Absence of Bleeding  Outcome: Ongoing, Progressing     Problem: Device-Related Complication Risk (Cardiac Rhythm Management Device)  Goal: Effective Device Function  Outcome: Ongoing, Progressing     Problem: Infection (Cardiac Rhythm Management Device)  Goal: Absence of Infection Signs and Symptoms  Outcome: Ongoing, Progressing     Problem: Pain (Cardiac Rhythm Management Device)  Goal: Acceptable Pain Level  Outcome: Ongoing, Progressing     Problem: Respiratory Compromise (Cardiac Rhythm Management Device)  Goal: Effective Oxygenation and Ventilation  Outcome: Ongoing, Progressing     Problem: Dysrhythmia  Goal: Normalized Cardiac Rhythm  Outcome: Ongoing, Progressing     Problem: Cardiac Impairment  Goal: Optimal Activity Tolerance  Outcome: Ongoing, Progressing

## 2023-09-13 NOTE — PLAN OF CARE
Naina Keyes is a 61 yo F with an extensive cardiac history including mechanical AVR 2/2 bacterial endocarditis, TAA repair with Dacron graft, VSD surgical patch repair, tachy-jeanna syndrome s/p pacemaker, pAF, HFpEF and HTN who was admitted management of a-fib with RVR.     Vitals:    09/13/23 1124   BP:    Pulse: 69   Resp:    Temp:      Physical exam: no JVD. Regular rate. Systolic ejection murmur with mechanical S2. No peripheral edema.    Assessment and Plan    HFpEF exacerbation with secondary atrial fibrillation:   - continue metoprolol succinate 100mg daily for rate control   - continue warfarin for prophylaxis   - start SGLT2-inhibitor   - furosemide PO as needed   - discussed need for dietary sodium restriction    Anuja Stewart, DO  Internal Medicine PGY1

## 2023-09-13 NOTE — PLAN OF CARE
Problem: Adult Inpatient Plan of Care  Goal: Plan of Care Review  Outcome: Ongoing, Progressing  Goal: Optimal Comfort and Wellbeing  Outcome: Ongoing, Progressing     Problem: Fall Injury Risk  Goal: Absence of Fall and Fall-Related Injury  Outcome: Ongoing, Progressing     Problem: Bleeding (Cardiac Rhythm Management Device)  Goal: Absence of Bleeding  Outcome: Ongoing, Progressing     Problem: Dysrhythmia  Goal: Normalized Cardiac Rhythm  Outcome: Ongoing, Progressing

## 2023-09-13 NOTE — PLAN OF CARE
09/13/23 1304   Discharge Reassessment   Assessment Type Discharge Planning Reassessment   Discharge Plan discussed with: Patient   Discharge Plan A Home with family   Discharge Plan B Home   DME Needed Upon Discharge  none   Transition of Care Barriers None   Why the patient remains in the hospital Requires continued medical care   Post-Acute Status   Post-Acute Authorization Other   Coverage [employer] MANAGED MEDICARE - [employer] HMO PPO SPECIAL NEEDS   Other Status No Post-Acute Service Needs   Hospital Resources/Appts/Education Provided Provided education on problems/symptoms using teachback   Discharge Delays None known at this time     CM met with patient/family to discuss any changes in discharge planning.  Patient to DC home  Barriers:  None Discharge delays Heparin drip and monitoring INR. : No changes in DC plans. OFELIA:  9/15/23    CM spoke with patient re: who is monitoring her PT/INR levels and patient stated her previous PCP retired and not she has a new PCP. Patient requires assistance in securing a place to monitor blood work. Patient informed of signing for My Chart with [facility].     [facility] with Dr Donnie Calderon  [address] [place] [address].  [place] [id number]  #[phone]  DATE: 10/30/23  TIME: 4:00 pm     Kenya Deluca RN  Case Management  [facility]  [phone]

## 2023-09-13 NOTE — NURSING
Pt AAO x 4, no c/o pain. Telemetry monitoring in progress via telemetry box. Vital signs stable throughout the shift. Pt is ambulatory and independent. Heparin infusing at 12U/hr to right forearm without difficulty. No s/s of bleeding or distress. Pt updated on POC. Significant other at the bedside.

## 2023-09-13 NOTE — PROGRESS NOTES
"[facility] - [facility] ([address] Medicine  Progress Note    Patient Name: Naina Keyes  MRN: [id number]  Patient Class: IP- Inpatient   Admission Date: 9/9/2023  Length of Stay: 4 days  Attending Physician: Shaye Tucker MD  Primary Care Provider: Anjana Torres NP        Subjective:     Principal Problem:Atrial fibrillation with RVR        HPI:  Naina Keyes is a 63 yo F with an extensive cardiac history including mechanical AVR 2/2 bacterial endocarditis, TAA repair with Dacron graft, VSD surgical patch repair, s/p pacemaker, pAF, and HTN who is being admitted to MICU for further management of a-fib with RVR requiring titrating diltiazem drip. She initially presented to the ED complaining of intermittent palpitations x 1 week. During the episodes of palpitations, she also experienced concomitant SOB, nausea, fatigue, and generalized weakness. This morning, her symptoms were sustained and more severe, which prompted her to come to the ED. During our interview, she additionally mentioned that she has been experiencing epigastric abdominal pain described as "feeling my pulse in my stomach." She is on chronic anticoagulation and chronic antibiotics, denies missing any doses.      Upon arrival to the ED, BP 99/62, , RR 20, & SpO2 100% on RA. On exam, she had an irregularly irregular heart beat and was tachycardic. Significant labs: Trop negative, BNP 1157, D-dimer 0.45, lactate 1.3, T bili 1.8, CBC unremarkable. EKG with a-fib with RVR; no STEMI. CXR with no acute process. CT chest with bilateral small pleural effusions & findings possibly due to increased pulmonary venous pressure. She was given 1 L NS bolus, metoprolol 5 mg IV x 3 (11:50, 14:02, 14:43), diltiazem 10 mg IV x 1, and Toprol 100 mg x 1.       Overview/Hospital Course:  Ms. Keyes was initially admitted to MICU for management of afib with RVR.  She was started on a titrating Diltiazem drip with no improvement.  Cardiology was " consulted and she was started on an Amio gtt and IV Lasix.  Heart rates improved and Cardiology suggested no PO Amio, just Metoprolol.  She was SD to  on 9/10 but boarded in the ICU while waiting on a floor bed.  She was found to have melanotic stools.  GI was consulted and Warfarin was held.  She underwent EGD 9/11 that showed erosive gastropathy with no bleeding and no stigmata of recent bleeding or ulcer.  She was changed to PO PPI daily.  She was started back on Warfarin with Heparin bridge while awaiting therapeutic INR.  Of note her goal INR is unclear - either 2-3 or 2.5-3.5.  PharmD is working on finding her exact INR goal.  She arrived to the floor on 9/12.      Interval History: SD from ICU overnight.  This morning reports feeling well.  Denies any further bleeding.  Hgb was 11.8 but repeat was 12.5. No further bleeding.  INR 2 today.  PharmD working on finding out exact goal INR - unclear if 2-3 vs 2.5-3.5.  Tentative DC marilee if INR is in therapeutic range.    Review of Systems   Constitutional:  Negative for chills, fatigue and fever.   Respiratory:  Negative for cough and shortness of breath.    Cardiovascular:  Negative for chest pain, palpitations and leg swelling.   Gastrointestinal:  Negative for abdominal pain, diarrhea, nausea and vomiting.   Genitourinary:  Negative for dysuria and urgency.   Neurological:  Negative for dizziness and headaches.   All other systems reviewed and are negative.    Objective:     Vital Signs (Most Recent):  Temp: 98.2 °F (36.8 °C) (09/13/23 1157)  Pulse: 72 (09/13/23 1157)  Resp: 18 (09/13/23 1157)  BP: (!) 119/57 (09/13/23 1157)  SpO2: 97 % (09/13/23 1157) Vital Signs (24h Range):  Temp:  [97.8 °F (36.6 °C)-98.2 °F (36.8 °C)] 98.2 °F (36.8 °C)  Pulse:  [64-78] 72  Resp:  [18-20] 18  SpO2:  [92 %-97 %] 97 %  BP: ()/(49-61) 119/57     Weight: 68 kg (150 lb)  Body mass index is 26.57 kg/m².    Intake/Output Summary (Last 24 hours) at 9/13/2023 1234  Last data  filed at 9/12/2023 2302  Gross per 24 hour   Intake --   Output 1900 ml   Net -1900 ml         Physical Exam  Constitutional:       Appearance: Normal appearance. She is well-developed.   HENT:      Head: Normocephalic and atraumatic.   Cardiovascular:      Rate and Rhythm: Normal rate and regular rhythm.      Heart sounds: No murmur heard.     Comments: Mechanical valve click  Pulmonary:      Effort: Pulmonary effort is normal. No respiratory distress.      Breath sounds: Normal breath sounds. No wheezing or rales.   Abdominal:      General: There is no distension.      Palpations: Abdomen is soft.      Tenderness: There is no abdominal tenderness.   Musculoskeletal:         General: No deformity.   Skin:     General: Skin is warm.   Neurological:      General: No focal deficit present.      Mental Status: She is alert and oriented to person, place, and time. Mental status is at baseline.             Significant Labs: All pertinent labs within the past 24 hours have been reviewed.  CBC:   Recent Labs   Lab 09/12/23  1904 09/13/23  0310 09/13/23  0825   WBC 8.56 7.89 6.72   HGB 13.2 11.8* 12.5   HCT 37.0 35.3* 35.5*    230 238     CMP:   Recent Labs   Lab 09/12/23  0422 09/13/23  0310    138   K 3.5 3.2*    101   CO2 23 25   * 107   BUN 25* 20   CREATININE 1.0 0.9   CALCIUM 9.0 9.1   PROT 6.4 6.1   ALBUMIN 3.5 3.4*   BILITOT 1.2* 0.7   ALKPHOS 105 88   AST 26 20   ALT 25 21   ANIONGAP 12 12     Coagulation:   Recent Labs   Lab 09/13/23  0310   INR 2.0*   APTT 53.0*  53.2*       Significant Imaging: I have reviewed all pertinent imaging results/findings within the past 24 hours.      Assessment/Plan:      * Atrial fibrillation with RVR  Patient with Paroxysmal (<7 days) atrial fibrillation which is uncontrolled currently with Beta Blocker. Patient is currently in atrial fibrillation.[id number] Score: 1. HASBLED Score: 2. Anticoagulation indicated. Anticoagulation done with Heparin bridge to  Warfarin.    · Initially admitted to MICU with afib with RVR  · Started on a Dilt gtt with no improvement in HR  · Cardiology consulted and started on an Amio gtt with improvement in HR  · Continue Metoprolol monotherapy - Per Cards PO Amio not needed  · Continue Heparin bridge to Warfarin  · PharmD consulted for Warfarin:  Goal INR unclear - either 2-3 or 2.5-3.5.  Appreciate assistance in finding appropriate range    Acute decompensated heart failure  · 2D echo Sept 2023 with EF 55%, likely provoked from afib with RVR  · BNP >1,000 and CXR with pulmonary edema  · Started on IV diuresis  · Strict I&Os with daily weights  · Low Sodium diet with 1.5L fluid restriction  · Continue Metoprolol  · Cardiology consulted:  Suggest Lasix prn      S/P Mechanical AVR secondary to bacterial endocarditis -1997, St Patric valve  · Follows with Dr. Gallegos of Interventional Cardiology  · Continue Heparin bridge to Warfarin as above  · Continue suppressive therapy with Cefadroxil 1g BID      Essential hypertension  · Chronic issue  · Hold BP meds with softer BP  · Continue Metoprolol daily      Gastrointestinal hemorrhage with melena  · While in ICU had melanotic stools  · Hgb remains stable, no transfusions required  · GI consulted  · EGD 9/11 with erosive gastropathy with no bleeding and no stigmata of recent bleeding or ulcer  · Continue PPI daily      Warfarin anticoagulation  · On Heparin bridge to Warfarin for mechanical valves, grafts, and afib  · Goal INR either 2-3 or 2.5-3.5 - PharmD working on finding right INR  · PharmD consult for dosing      S/P thoracic aortic aneurysm repair with Dacron graft (2007)  · Follows with Dr. Gallegos of Interventional Cardiology  · Continue Heparin bridge to Warfarin as above  · Continue suppressive therapy with Cefadroxil 1g BID      S/P VSD surgical patch repair -1997  · Follows with Dr. Gallegos of Interventional Cardiology  · Continue Heparin bridge to Warfarin as above  · Continue suppressive  therapy with Cefadroxil 1g BID      VTE Risk Mitigation (From admission, onward)         Ordered     heparin 25,000 units in dextrose 5% (100 units/ml) IV bolus from bag - ADDITIONAL PRN BOLUS - 60 units/kg  As needed (PRN)        Question:  Heparin Infusion Adjustment (DO NOT MODIFY ANSWER)  Answer:  \\[email or web address]\epic\Images\Pharmacy\HeparinInfusions\heparin LOW INTENSITY nomogram for [facility] [id number].pdf    09/12/23 0926     heparin 25,000 units in dextrose 5% (100 units/ml) IV bolus from bag - ADDITIONAL PRN BOLUS - 30 units/kg  As needed (PRN)        Question:  Heparin Infusion Adjustment (DO NOT MODIFY ANSWER)  Answer:  \\[email or web address]\epic\Images\Pharmacy\HeparinInfusions\heparin LOW INTENSITY nomogram for [facility] [id number].pdf    09/12/23 0926     heparin 25,000 units in dextrose 5% 250 mL (100 units/mL) infusion LOW INTENSITY nomogram - [facility]  Continuous        Question Answer Comment   Heparin Infusion Adjustment (DO NOT MODIFY ANSWER) \\[email or web address]\epic\Images\Pharmacy\HeparinInfusions\heparin LOW INTENSITY nomogram for [facility] [id number].pdf    Begin at (in units/kg/hr) 12        09/12/23 0926     warfarin (COUMADIN) tablet 7.5 mg  Daily         09/11/23 1534                Discharge Planning   OFELIA: 9/14/2023     Code Status: Full Code   Is the patient medically ready for discharge?: No    Reason for patient still in hospital (select all that apply): Laboratory test  Discharge Plan A: Home with family                  Shaye Tucker MD  Department of Hospital Medicine   [facility] - Intensive Care ([place]-[facility])

## 2023-09-13 NOTE — SUBJECTIVE & OBJECTIVE
Interval History: SD from ICU overnight.  This morning reports feeling well.  Denies any further bleeding.  Hgb was 11.8 but repeat was 12.5. No further bleeding.  INR 2 today.  PharmD working on finding out exact goal INR - unclear if 2-3 vs 2.5-3.5.  Tentative DC marilee if INR is in therapeutic range.    Review of Systems   Constitutional:  Negative for chills, fatigue and fever.   Respiratory:  Negative for cough and shortness of breath.    Cardiovascular:  Negative for chest pain, palpitations and leg swelling.   Gastrointestinal:  Negative for abdominal pain, diarrhea, nausea and vomiting.   Genitourinary:  Negative for dysuria and urgency.   Neurological:  Negative for dizziness and headaches.   All other systems reviewed and are negative.    Objective:     Vital Signs (Most Recent):  Temp: 98.2 °F (36.8 °C) (09/13/23 1157)  Pulse: 72 (09/13/23 1157)  Resp: 18 (09/13/23 1157)  BP: (!) 119/57 (09/13/23 1157)  SpO2: 97 % (09/13/23 1157) Vital Signs (24h Range):  Temp:  [97.8 °F (36.6 °C)-98.2 °F (36.8 °C)] 98.2 °F (36.8 °C)  Pulse:  [64-78] 72  Resp:  [18-20] 18  SpO2:  [92 %-97 %] 97 %  BP: ()/(49-61) 119/57     Weight: 68 kg (150 lb)  Body mass index is 26.57 kg/m².    Intake/Output Summary (Last 24 hours) at 9/13/2023 1234  Last data filed at 9/12/2023 2302  Gross per 24 hour   Intake --   Output 1900 ml   Net -1900 ml         Physical Exam  Constitutional:       Appearance: Normal appearance. She is well-developed.   HENT:      Head: Normocephalic and atraumatic.   Cardiovascular:      Rate and Rhythm: Normal rate and regular rhythm.      Heart sounds: No murmur heard.     Comments: Mechanical valve click  Pulmonary:      Effort: Pulmonary effort is normal. No respiratory distress.      Breath sounds: Normal breath sounds. No wheezing or rales.   Abdominal:      General: There is no distension.      Palpations: Abdomen is soft.      Tenderness: There is no abdominal tenderness.   Musculoskeletal:          General: No deformity.   Skin:     General: Skin is warm.   Neurological:      General: No focal deficit present.      Mental Status: She is alert and oriented to person, place, and time. Mental status is at baseline.             Significant Labs: All pertinent labs within the past 24 hours have been reviewed.  CBC:   Recent Labs   Lab 09/12/23  1904 09/13/23  0310 09/13/23  0825   WBC 8.56 7.89 6.72   HGB 13.2 11.8* 12.5   HCT 37.0 35.3* 35.5*    230 238     CMP:   Recent Labs   Lab 09/12/23  0422 09/13/23  0310    138   K 3.5 3.2*    101   CO2 23 25   * 107   BUN 25* 20   CREATININE 1.0 0.9   CALCIUM 9.0 9.1   PROT 6.4 6.1   ALBUMIN 3.5 3.4*   BILITOT 1.2* 0.7   ALKPHOS 105 88   AST 26 20   ALT 25 21   ANIONGAP 12 12     Coagulation:   Recent Labs   Lab 09/13/23 0310   INR 2.0*   APTT 53.0*  53.2*       Significant Imaging: I have reviewed all pertinent imaging results/findings within the past 24 hours.

## 2023-09-14 VITALS
HEIGHT: 63 IN | RESPIRATION RATE: 16 BRPM | DIASTOLIC BLOOD PRESSURE: 56 MMHG | TEMPERATURE: 98 F | SYSTOLIC BLOOD PRESSURE: 111 MMHG | BODY MASS INDEX: 26.58 KG/M2 | WEIGHT: 150 LBS | OXYGEN SATURATION: 96 % | HEART RATE: 60 BPM

## 2023-09-14 PROBLEM — I48.91 ATRIAL FIBRILLATION WITH RVR: Status: RESOLVED | Noted: 2023-09-09 | Resolved: 2023-09-14

## 2023-09-14 PROBLEM — I50.9 ACUTE DECOMPENSATED HEART FAILURE: Status: RESOLVED | Noted: 2023-09-09 | Resolved: 2023-09-14

## 2023-09-14 PROBLEM — K92.1 GASTROINTESTINAL HEMORRHAGE WITH MELENA: Status: RESOLVED | Noted: 2023-09-09 | Resolved: 2023-09-14

## 2023-09-14 LAB
ALBUMIN SERPL BCP-MCNC: 3.3 G/DL (ref 3.5–5.2)
ALP SERPL-CCNC: 82 U/L (ref 55–135)
ALT SERPL W/O P-5'-P-CCNC: 19 U/L (ref 10–44)
ANION GAP SERPL CALC-SCNC: 10 MMOL/L (ref 8–16)
APTT PPP: 59.8 SEC (ref 21–32)
AST SERPL-CCNC: 19 U/L (ref 10–40)
BASOPHILS # BLD AUTO: 0.05 K/UL (ref 0–0.2)
BASOPHILS NFR BLD: 0.9 % (ref 0–1.9)
BILIRUB SERPL-MCNC: 0.5 MG/DL (ref 0.1–1)
BUN SERPL-MCNC: 22 MG/DL (ref 8–23)
CALCIUM SERPL-MCNC: 9 MG/DL (ref 8.7–10.5)
CHLORIDE SERPL-SCNC: 106 MMOL/L (ref 95–110)
CO2 SERPL-SCNC: 23 MMOL/L (ref 23–29)
CREAT SERPL-MCNC: 1 MG/DL (ref 0.5–1.4)
DIFFERENTIAL METHOD: ABNORMAL
EOSINOPHIL # BLD AUTO: 0.3 K/UL (ref 0–0.5)
EOSINOPHIL NFR BLD: 6.4 % (ref 0–8)
ERYTHROCYTE [DISTWIDTH] IN BLOOD BY AUTOMATED COUNT: 15 % (ref 11.5–14.5)
EST. GFR  (NO RACE VARIABLE): >60 ML/MIN/1.73 M^2
GLUCOSE SERPL-MCNC: 92 MG/DL (ref 70–110)
HCT VFR BLD AUTO: 36 % (ref 37–48.5)
HGB BLD-MCNC: 11.6 G/DL (ref 12–16)
IMM GRANULOCYTES # BLD AUTO: 0.01 K/UL (ref 0–0.04)
IMM GRANULOCYTES NFR BLD AUTO: 0.2 % (ref 0–0.5)
INR PPP: 2.8 (ref 0.8–1.2)
LYMPHOCYTES # BLD AUTO: 1.3 K/UL (ref 1–4.8)
LYMPHOCYTES NFR BLD: 25.3 % (ref 18–48)
MAGNESIUM SERPL-MCNC: 2.2 MG/DL (ref 1.6–2.6)
MCH RBC QN AUTO: 33.3 PG (ref 27–31)
MCHC RBC AUTO-ENTMCNC: 32.2 G/DL (ref 32–36)
MCV RBC AUTO: 103 FL (ref 82–98)
MONOCYTES # BLD AUTO: 0.6 K/UL (ref 0.3–1)
MONOCYTES NFR BLD: 10.4 % (ref 4–15)
NEUTROPHILS # BLD AUTO: 3 K/UL (ref 1.8–7.7)
NEUTROPHILS NFR BLD: 56.8 % (ref 38–73)
NRBC BLD-RTO: 0 /100 WBC
PHOSPHATE SERPL-MCNC: 3.5 MG/DL (ref 2.7–4.5)
PLATELET # BLD AUTO: 204 K/UL (ref 150–450)
PMV BLD AUTO: 10 FL (ref 9.2–12.9)
POTASSIUM SERPL-SCNC: 4.3 MMOL/L (ref 3.5–5.1)
PROT SERPL-MCNC: 5.8 G/DL (ref 6–8.4)
PROTHROMBIN TIME: 28.5 SEC (ref 9–12.5)
RBC # BLD AUTO: 3.48 M/UL (ref 4–5.4)
SODIUM SERPL-SCNC: 139 MMOL/L (ref 136–145)
WBC # BLD AUTO: 5.29 K/UL (ref 3.9–12.7)

## 2023-09-14 PROCEDURE — 85025 COMPLETE CBC W/AUTO DIFF WBC: CPT

## 2023-09-14 PROCEDURE — 25000003 PHARM REV CODE 250: Performed by: HOSPITALIST

## 2023-09-14 PROCEDURE — 99239 PR HOSPITAL DISCHARGE DAY,>30 MIN: ICD-10-PCS | Mod: ,,, | Performed by: HOSPITALIST

## 2023-09-14 PROCEDURE — 99239 HOSP IP/OBS DSCHRG MGMT >30: CPT | Mod: ,,, | Performed by: HOSPITALIST

## 2023-09-14 PROCEDURE — 36415 COLL VENOUS BLD VENIPUNCTURE: CPT

## 2023-09-14 PROCEDURE — 83735 ASSAY OF MAGNESIUM: CPT

## 2023-09-14 PROCEDURE — 85730 THROMBOPLASTIN TIME PARTIAL: CPT

## 2023-09-14 PROCEDURE — 1111F PR DISCHARGE MEDS RECONCILED W/ CURRENT OUTPATIENT MED LIST: ICD-10-PCS | Mod: CPTII,,, | Performed by: HOSPITALIST

## 2023-09-14 PROCEDURE — 85610 PROTHROMBIN TIME: CPT

## 2023-09-14 PROCEDURE — 1111F DSCHRG MED/CURRENT MED MERGE: CPT | Mod: CPTII,,, | Performed by: HOSPITALIST

## 2023-09-14 PROCEDURE — 25000003 PHARM REV CODE 250

## 2023-09-14 PROCEDURE — 25000003 PHARM REV CODE 250: Performed by: INTERNAL MEDICINE

## 2023-09-14 PROCEDURE — 80053 COMPREHEN METABOLIC PANEL: CPT

## 2023-09-14 PROCEDURE — 84100 ASSAY OF PHOSPHORUS: CPT

## 2023-09-14 RX ORDER — FUROSEMIDE 40 MG/1
40 TABLET ORAL DAILY PRN
Qty: 30 TABLET | Refills: 0 | Status: SHIPPED | OUTPATIENT
Start: 2023-09-14 | End: 2023-10-14

## 2023-09-14 RX ORDER — WARFARIN SODIUM 5 MG/1
5 TABLET ORAL DAILY
Qty: 30 TABLET | Refills: 0 | Status: SHIPPED | OUTPATIENT
Start: 2023-09-14 | End: 2023-10-14

## 2023-09-14 RX ORDER — TALC
9 POWDER (GRAM) TOPICAL NIGHTLY PRN
Qty: 10 TABLET | Refills: 0 | Status: SHIPPED | OUTPATIENT
Start: 2023-09-14 | End: 2023-09-24

## 2023-09-14 RX ORDER — METOPROLOL SUCCINATE 100 MG/1
100 TABLET, EXTENDED RELEASE ORAL DAILY
Qty: 30 TABLET | Refills: 0 | Status: SHIPPED | OUTPATIENT
Start: 2023-09-15 | End: 2023-10-15

## 2023-09-14 RX ORDER — PANTOPRAZOLE SODIUM 40 MG/1
40 TABLET, DELAYED RELEASE ORAL DAILY
Qty: 30 TABLET | Refills: 0 | Status: SHIPPED | OUTPATIENT
Start: 2023-09-15 | End: 2023-10-15

## 2023-09-14 RX ADMIN — CEFADROXIL 1 G: 500 CAPSULE ORAL at 09:09

## 2023-09-14 RX ADMIN — MUPIROCIN: 20 OINTMENT TOPICAL at 09:09

## 2023-09-14 RX ADMIN — METOPROLOL SUCCINATE 100 MG: 100 TABLET, EXTENDED RELEASE ORAL at 09:09

## 2023-09-14 RX ADMIN — PANTOPRAZOLE SODIUM 40 MG: 40 TABLET, DELAYED RELEASE ORAL at 09:09

## 2023-09-14 NOTE — NURSING
Pt D/C home with . Pt left with all belongings. CM educated pt on follow up INR lab draw in the following week, and cardiology appointment, pt voiced understanding. Pt refused bedside delivery meds requested to  meds on her way out. Bedside delivery notified. D/C instructions reviewed with pt and  both verbalized understanding. Pt ambulated off unit with .

## 2023-09-14 NOTE — PLAN OF CARE
[facility] - [facility] ([facility]  Discharge Final Note    Primary Care Provider: Anjana Torres NP    Expected Discharge Date: 9/14/2023    Final Discharge Note (most recent)       Final Note - 09/14/23 1333          Final Note    Assessment Type Final Discharge Note     Anticipated Discharge Disposition Home or Self Care with Planned Readmission     What phone number can be called within the next 1-3 days to see how you are doing after discharge? [phone]        Post-Acute Status    Post-Acute Authorization Other     Coverage [employer] MANAGED MEDICARE - [employer] HMO PPO SPECIAL NEEDS     Other Status Community Services   Care at home referral 9/12    Discharge Delays None known at this time                     Important Message from Medicare  Important Message from Medicare regarding Discharge Appeal Rights: Explained to patient/caregiver, Signed/date by patient/caregiver     Date IMM was signed: 09/12/23  Time IMM was signed: 1338    Contact Info       [facility]    Coumadin Clinic  Dr Calderon   [address] [place] [address].  [facility]  [id number]  #[phone]       Next Steps: Follow up on 10/30/2023    Instructions: 10/30/23 with Dr Calderon @ 4:00 pm          See CM notes re: f/u appointments and lab work.    Kenya Deluca RN  Case Management  [facility]  [phone]

## 2023-09-14 NOTE — PLAN OF CARE
09/14/23 1333   Final Note   Assessment Type Final Discharge Note   Anticipated Discharge Disposition Home [place]   What phone number can be called within the next 1-3 days to see how you are doing after discharge? [phone]   Post-Acute Status   Post-Acute Authorization Other   Coverage [employer] MANAGED MEDICARE - HU[employer]NA [facility] HMO PPO SPECIAL NEEDS   Other Status Community Services  (Care at home referral 9/12)   Discharge Delays None known at this time     Kenya Deluca RN  Case Management  [facility]  [phone]

## 2023-09-14 NOTE — PLAN OF CARE
"CM called  [facility] and spoke with Zayra in the lab department.  PT/INT lab collection on 9/16/23. Requested documents to send is an order along with diagnostic code Z79.01   faxed to   F: [phone].    Your fax has been successfully sent to [phone] at [phone].  ------------------------------------------------------------  From: [id number]  ------------------------------------------------------------  9/14/2023 12:28:16 PM Transmission Record          Sent to [phone] with remote ID "[phone]        "          Result: (0/339;0/0) Success          Page record: 1 - 4          Elapsed time: 02:13 on channel 65      12:28 pm  CM called and informed the patient and spouse. Patient updated on lab collection scheduled 9/16/23 @ [facility] and [facility] with Cardiology is scheduled 9/25/23 @ 1:45 pm.  And that the nurse from General Family medicine with call with a date and time. Both patient and spouse verified an understanding    [place]  [facility] with Dr Donnie Calderon  [address] [place] [address].  [place] [id number]  #[phone]  DATE: 09/25/23  TIME: 1:45 pm    11:35 am   HOSPFU PCP with Dr Anjana Torres NP spoke with   [facility]  # [phone]  HCP: Zahra Torres  Date TBD  Time: TBD     CM spoke  Lluvia kay   @ [facility] Family [facility] and will speak with NP to schedule a f/u appointment for hospital f/u       Kenya Deluca RN  Case Management  [facility]  [phone]          "

## 2023-09-14 NOTE — PLAN OF CARE
09/14/23 0902   Post-Acute Status   Post-Acute Authorization Other   Other Status Community Services   Hospital Resources/Appts/Education Provided Provided education on problems/symptoms using teachback   Discharge Delays None known at this time   Discharge Plan   Discharge Plan A Home with family   Discharge Plan B Home      Unit pharmacist called  the patients Cardiology clinic yesterday and they mentioned nonadherence (not seeing her since January). CM spoke with the patient in re: non adherence with Coumadin monitoring.  Patient referred to Care at Home .  CM spoke to the patient and her spouse.      Kenya Deluca RN  Case Management  [facility]  [phone]

## 2023-09-14 NOTE — DISCHARGE INSTRUCTIONS
- continue metoprolol succinate 100mg daily for rate control  - continue warfarin for prophylaxis  - start empagliflozin  - Take furosemide oral as needed for shortness of breath, leg swelling or increase of weight by 3-5 pounds.  - please follow the dietary sodium restriction that cardiology recommended, 2 gram salt diet.   - please have your INR checked at the coumadin clinic [facility]. We recommend that your blood work be done on Saturday 9/16 so they can adjust your coumadin dose if needed.

## 2023-09-14 NOTE — PLAN OF CARE
Problem: Adult Inpatient Plan of Care  Goal: Plan of Care Review  Outcome: Met     Problem: Infection  Goal: Absence of Infection Signs and Symptoms  Outcome: Met     Problem: Fall Injury Risk  Goal: Absence of Fall and Fall-Related Injury  Outcome: Met     Problem: Bleeding (Cardiac Rhythm Management Device)  Goal: Absence of Bleeding  Outcome: Met     Problem: Pain (Cardiac Rhythm Management Device)  Goal: Acceptable Pain Level  Outcome: Met    No acute events overnight. Pt continues on heparin gtt with bridge to coumadin. Supportive significant other at bedside

## 2023-09-14 NOTE — NURSING
Pt AAO x 4, no c/o pain at this time. Pt is on RA with no SOB / distress noted. Pt is on continuous telemetry monitoring. Pt is continent of B&B. Pt is ambulatory with no assistance, pt educated to call for assistance if needed, pt verbalized understanding. Pt skin is clean, dry, and intact. Pt has heparin infusing to PIV @ 12 units/hr. Family at bedside. Bed is low and locked, call light in reach. Monitoring.

## 2023-09-15 ENCOUNTER — PATIENT OUTREACH (OUTPATIENT)
Dept: ADMINISTRATIVE | Facility: CLINIC | Age: 63
End: 2023-09-15
Payer: MEDICARE

## 2023-09-15 NOTE — DISCHARGE SUMMARY
"[facility] - Intensive Care ([address] Medicine  Discharge Summary      Patient Name: Naina Keyes  MRN: [id number]  OLINDA: [id number]  Patient Class: IP- Inpatient  Admission Date: 9/9/2023  Hospital Length of Stay: 5 days  Discharge Date and Time:  09/14/2023 9:34 PM  Attending Physician: Vidya att. providers found   Discharging Provider: Layla Jordan MD  Primary Care Provider: Anjana Torres NP  Hospital Medicine Team: [facility] MED A Layla Jordan MD  Primary Care Team: [facility] A    HPI:   Naina Keyes is a 63 yo F with an extensive cardiac history including mechanical AVR 2/2 bacterial endocarditis, TAA repair with Dacron graft, VSD surgical patch repair, s/p pacemaker, pAF, and HTN who is being admitted to MICU for further management of a-fib with RVR requiring titrating diltiazem drip. She initially presented to the ED complaining of intermittent palpitations x 1 week. During the episodes of palpitations, she also experienced concomitant SOB, nausea, fatigue, and generalized weakness. This morning, her symptoms were sustained and more severe, which prompted her to come to the ED. During our interview, she additionally mentioned that she has been experiencing epigastric abdominal pain described as "feeling my pulse in my stomach." She is on chronic anticoagulation and chronic antibiotics, denies missing any doses.      Upon arrival to the ED, BP 99/62, , RR 20, & SpO2 100% on RA. On exam, she had an irregularly irregular heart beat and was tachycardic. Significant labs: Trop negative, BNP 1157, D-dimer 0.45, lactate 1.3, T bili 1.8, CBC unremarkable. EKG with a-fib with RVR; no STEMI. CXR with no acute process. CT chest with bilateral small pleural effusions & findings possibly due to increased pulmonary venous pressure. She was given 1 L NS bolus, metoprolol 5 mg IV x 3 (11:50, 14:02, 14:43), diltiazem 10 mg IV x 1, and Toprol 100 mg x 1.       Procedure(s) (LRB):  EGD " (ESOPHAGOGASTRODUODENOSCOPY) (N/A)      Hospital Course:   Ms. Keyes was initially admitted to MICU for management of afib with RVR.  She was started on a titrating Diltiazem drip with no improvement.  Cardiology was consulted and she was started on an Amio gtt and IV Lasix.  Heart rates improved and Cardiology suggested no PO Amio, just Metoprolol.  She was SD to  on 9/10 but boarded in the ICU while waiting on a floor bed.  She was found to have melanotic stools.  GI was consulted and Warfarin was held.  She underwent EGD 9/11 that showed erosive gastropathy with no bleeding and no stigmata of recent bleeding or ulcer.  She was changed to PO PPI daily.  She was started back on Warfarin with Heparin bridge while awaiting therapeutic INR. She arrived to the floor on 9/12.    On 9/13; INR became therapeutic. HR is controlled. Pt is feeling great. No black stool. Will discharge home with cardiology follow up outpt.     Below are the medical problems that were addressed this hospitalization;     * Atrial fibrillation with RVR  Patient with Paroxysmal (<7 days) atrial fibrillation. [id number] Score: 1. HASBLED Score: 2. Anticoagulation indicated. Anticoagulation done with Heparin bridge to Warfarin. INR is therapeutic today.     Initially admitted to MICU with afib with RVR  Started on a Dilt gtt with no improvement in HR  Cardiology consulted and started on an Amio gtt with improvement in HR  Continue Metoprolol monotherapy - Per Cards PO Amio not needed. Pt will be discharged on metoprolol xl 100 mg daily.  Heparin bridged to Warfarin. INR is therapeutic today. Per pharmacy, pt can be discharged on warfarin 5 mg daily with repeat INR level. Pt was asked to have her INR checked in 2 days. She follows up with cardiology clinic which manages her INR outpt.      Acute decompensated heart failure  2D echo Sept 2023 with EF 55%, likely provoked from afib with RVR  BNP >1,000 and CXR with pulmonary edema  Started on IV  diuresis  Strict I&Os with daily weights  Low Sodium diet with 1.5L fluid restriction  Continue Metoprolol  Cardiology consulted:  Suggest Lasix prn on discharge        S/P Mechanical AVR secondary to bacterial endocarditis -1997, St Patric valve  Follows with Dr. Gallegos of Interventional Cardiology  Continue suppressive therapy with Cefadroxil 1g BID  Continue coumadin with goal of INR 2.5 - 3.5 given that pt also has afib.        Gastrointestinal hemorrhage with melena  While in ICU had melanotic stools  Hgb remains stable, no transfusions required  GI consulted  EGD 9/11 with erosive gastropathy with no bleeding and no stigmata of recent bleeding or ulcer  Continue PPI daily         S/P thoracic aortic aneurysm repair with Dacron graft (2007)  Follows with Dr. Gallegos of Interventional Cardiology  Continue suppressive therapy with Cefadroxil 1g BID        S/P VSD surgical patch repair -1997  Follows with Dr. Gallegos of Interventional Cardiology  Continue suppressive therapy with Cefadroxil 1g BID    Pt's clinical status much improved, she will be discharged home today with follow up with cardiology and the coumadin clinic outpt.      Physical exam;   Vitals; reviewed  General; no acute distress  HENT; NC/AT  CV; RRR  Resp; CTA bilateral lung fields.  Abdomen; soft, NT, ND       Goals of Care Treatment Preferences:  Code Status: Full Code      Consults:   Consults (From admission, onward)          Status Ordering Provider     Inpatient consult to Gastroenterology  Once        Provider:  (Not yet assigned)    Completed LI CASTANON-ON     Inpatient consult to Critical Care Medicine  Once        Provider:  (Not yet assigned)    Completed MARGARITO KEEN     Inpatient consult to Cardiology  Once        Provider:  (Not yet assigned)    Completed MARGARITO KEEN            No new Assessment & Plan notes have been filed under this hospital service since the last note was generated.  Service: Hospital Medicine    Final Active  Diagnoses:    Diagnosis Date Noted POA    Essential hypertension [I10] 09/09/2023 Yes    Warfarin anticoagulation [Z79.01] 05/18/2022 Not Applicable     Chronic    S/P Mechanical AVR secondary to bacterial endocarditis -1997, St Patric valve [Z95.2] 11/25/2013 Not Applicable     Chronic    S/P thoracic aortic aneurysm repair with Dacron graft (2007) [Z98.890, Z86.79] 11/25/2013 Not Applicable     Chronic    S/P VSD surgical patch repair -1997 [Z87.74] 11/25/2013 Not Applicable     Chronic      Problems Resolved During this Admission:    Diagnosis Date Noted Date Resolved POA    PRINCIPAL PROBLEM:  Atrial fibrillation with RVR [I48.91] 09/09/2023 09/14/2023 Yes    Acute decompensated heart failure [I50.9] 09/09/2023 09/14/2023 Yes    Gastrointestinal hemorrhage with melena [K92.1] 09/09/2023 09/14/2023 Yes       Discharged Condition: stable    Disposition: Home or Self Care    Follow Up:   Follow-up Information       [facility] Follow up on 10/30/2023.    Why: 10/30/23 with Dr Calderon @ 4:00 pm  Contact information:  Coumadin Clinic  Dr Calderon   [address] [place] [address]  [id number]  #[phone]                         Patient Instructions:      Ambulatory referral/consult to [facility]   Standing Status: Future   Referral Priority: Routine Referral Type: Consultation   Referral Reason: Specialty Services Required   Number of Visits Requested: 1     Ambulatory referral/consult to Cardiology   Standing Status: Future   Referral Priority: Routine Referral Type: Consultation   Referral Reason: Specialty Services Required   Requested Specialty: Cardiology   Number of Visits Requested: 1     POCT PT/INR   Standing Status: Future Standing Exp. Date: 11/12/24       Significant Diagnostic Studies: Labs:   CMP   Recent Labs   Lab 09/13/23  0310 09/14/23  0429    139   K 3.2* 4.3    106   CO2 25 23    92   BUN 20 22   CREATININE 0.9 1.0   CALCIUM 9.1 9.0   PROT 6.1 5.8*    ALBUMIN 3.4* 3.3*   BILITOT 0.7 0.5   ALKPHOS 88 82   AST 20 19   ALT 21 19   ANIONGAP 12 10    and CBC   Recent Labs   Lab 09/13/23  0310 09/13/23  0825 09/14/23  0429   WBC 7.89 6.72 5.29   HGB 11.8* 12.5 11.6*   HCT 35.3* 35.5* 36.0*    238 204       Pending Diagnostic Studies:       None           Medications:  Reconciled Home Medications:      Medication List        START taking these medications      furosemide 40 MG tablet  Commonly known as: LASIX  Take 1 tablet (40 mg total) by mouth daily as needed (Take if your weight increased by 3-5 Ibs, or if you developed shortness of breath and leg swelling.).     JARDIANCE 10 mg tablet  Generic drug: empagliflozin  Take 1 tablet (10 mg total) by mouth once daily.     melatonin 3 mg tablet  Commonly known as: MELATIN  Take 3 tablets (9 mg total) by mouth nightly as needed for Insomnia.     pantoprazole 40 MG tablet  Commonly known as: PROTONIX  Take 1 tablet (40 mg total) by mouth once daily.  Start taking on: September 15, 2023            CHANGE how you take these medications      metoprolol succinate 100 MG 24 hr tablet  Commonly known as: TOPROL-XL  Take 1 tablet (100 mg total) by mouth once daily.  Start taking on: September 15, 2023  What changed:   medication strength  how much to take  how to take this  when to take this  additional instructions     warfarin 5 MG tablet  Commonly known as: COUMADIN  Take 1 tablet (5 mg total) by mouth Daily.  What changed:   how much to take  how to take this  when to take this  additional instructions            CONTINUE taking these medications      acetaminophen 500 MG tablet  Commonly known as: TYLENOL  Take 1,000 mg by mouth daily as needed (pain/fever).     ALPRAZolam 0.25 MG tablet  Commonly known as: XANAX  Take 0.25 mg by mouth 2 (two) times daily as needed for Anxiety.     cefadroxil 500 MG Cap  Commonly known as: DURICEF  Take 2 capsules (1 g total) by mouth every 12 (twelve) hours.     EScitalopram oxalate  10 MG tablet  Commonly known as: LEXAPRO  Take by mouth.     loratadine 10 mg tablet  Commonly known as: CLARITIN  TAKE ONE TABLET BY MOUTH DAILY AS NEEDED FOR ALLERGIES     ofloxacin 0.3 % otic solution  Commonly known as: FLOXIN  5 drops once daily.     prednisoLONE acetate 1 % Drps  Commonly known as: PRED FORTE  Place 1 drop into the right eye 4 (four) times daily.     VENTOLIN HFA INHL  Inhale 1 puff into the lungs as needed (wheezing/shortness of breath).            STOP taking these medications      amLODIPine 5 MG tablet  Commonly known as: NORVASC     potassium chloride 10 MEQ Cpsr  Commonly known as: MICRO-K     sotaloL 80 MG tablet  Commonly known as: BETAPACE     valACYclovir 1000 MG tablet  Commonly known as: VALTREX     valsartan 320 MG tablet  Commonly known as: DIOVAN     zolpidem 10 mg Tab  Commonly known as: AMBIEN              Indwelling Lines/Drains at time of discharge:   Lines/Drains/Airways       None                   Time spent on the discharge of patient: 42 minutes         Layla Jordan MD  Department of Hospital Medicine  [facility] - Intensive Care ([place]-14)

## 2023-09-15 NOTE — PROGRESS NOTES
C3 nurse spoke with Naina Keyes  for a TCC post hospital discharge follow up call. The patient has a scheduled HOSFU appointment with Anjana Torres NP  on 9/25/23 @ [id number].

## 2024-03-13 DIAGNOSIS — I71.21 ANEURYSM OF ASCENDING AORTA WITHOUT RUPTURE: Primary | ICD-10-CM

## 2024-06-11 DIAGNOSIS — A49.1 STREPTOCOCCAL INFECTION: ICD-10-CM

## 2024-06-11 DIAGNOSIS — T82.6XXD PROSTHETIC VALVE ENDOCARDITIS, SUBSEQUENT ENCOUNTER: ICD-10-CM

## 2024-06-11 DIAGNOSIS — Z79.2 CHRONIC ANTIBIOTIC SUPPRESSION: ICD-10-CM

## 2024-06-11 DIAGNOSIS — I38 PROSTHETIC VALVE ENDOCARDITIS, SUBSEQUENT ENCOUNTER: ICD-10-CM

## 2024-06-11 RX ORDER — CEFADROXIL 500 MG/1
1 CAPSULE ORAL EVERY 12 HOURS
Qty: 120 CAPSULE | Refills: 11 | Status: SHIPPED | OUTPATIENT
Start: 2024-06-11 | End: 2025-06-11

## 2024-06-11 NOTE — TELEPHONE ENCOUNTER
----- Message from Glenys Medina MA sent at 6/11/2024 12:26 PM CDT -----  Regarding: refill  Contact: pt [phone]  Rx Refill/Request    Is this a Refill or New Rx:  Refill   Rx Name and Strength:  cefadroxil (DURICEF) 500 MG Cap  Preferred Pharmacy with phone number:  [facility] [id number] [address]  Phone: [phone] Fax: [phone]      Communication Preference  [phone]

## 2024-07-17 ENCOUNTER — TELEPHONE (OUTPATIENT)
Dept: CARDIOTHORACIC SURGERY | Facility: CLINIC | Age: 64
End: 2024-07-17
Payer: MEDICARE

## 2024-07-17 NOTE — TELEPHONE ENCOUNTER
----- Message from Magaly Arita sent at 7/17/2024 12:39 PM CDT -----  Type:  New Appointed Cardiologist     Who Called: Pt   Would the patient rather a call back or a response via [facility]? Call back   Best Call Back Number: [phone]  Additional Information: Please be advised, pt states that she is a former pt of Dr. Gallegos, and that she never received a call regarding who would be her new appointed

## 2024-07-18 ENCOUNTER — TELEPHONE (OUTPATIENT)
Dept: CARDIOLOGY | Facility: CLINIC | Age: 64
End: 2024-07-18
Payer: MEDICARE

## 2024-07-18 NOTE — TELEPHONE ENCOUNTER
Patient called to ask about follow up since  has left [facility].  She stated that she would like to see Dr. Gallegos and wanted a contact number for him  [place] [facility]  patient will call back if she decides to see Dr. Cooley or Giuseppe.

## 2025-03-13 NOTE — PROGRESS NOTES
Chief Complain    Follow-up (For pain in back , shoulders, knees and hips. Would like to review MRI and discuss what's next.) and Medication Reaction (Celebrex and gabapentin not helping.)    History Of Present Illness  Miguelina Gloria is a 37 y.o. adult here for evaluation of neck, mid and low back pain. The patient has been experiencing these symptoms for November of 2023. The patient describes the pain as sharp, dull, radiating, stabbing. The patient's current pain score is 4 on a scale from 0-10. The pain is worsened by bending forward, standing, and walking and is alleviated by medications Tylenol,massage. Since the start of the symptoms the pain has been unchanged.      Past Medical History  Miguelina has a past medical history of ADHD (attention deficit hyperactivity disorder) (2022), Anxiety (2023), Cluster headache (2018), Depression (2023), Difficulty walking (2018), Dysphagia (2020), Fibromyalgia, primary (2023), Headache, tension-type (2003), HL (hearing loss) (2021), Impacted cerumen, bilateral (10/26/2020), Migraine, unspecified, not intractable, without status migrainosus, Otalgia, bilateral (11/02/2020), Other allergy status, other than to drugs and biological substances, Other conditions influencing health status, Personal history of diseases of the blood and blood-forming organs and certain disorders involving the immune mechanism, Personal history of other diseases of the digestive system (11/20/2020), Personal history of other diseases of the female genital tract (05/13/2019), Personal history of other diseases of the nervous system and sense organs, and Weakness of limb (2021).    Surgical History  Miguelina has a past surgical history that includes Other surgical history (12/14/2020).    Social History  Miguelina reports that Miguelina has never smoked. Miguelina has never used smokeless tobacco. Miguelina reports that Miguelina does not drink alcohol and does not use drugs.    Family History  Family History  Subjective:      Patient ID: Naina Keyes is a 61 y.o. female.    Chief Complaint:Fever      History of Present Illness    A 61-year-old woman with mechanical AV replacement, aortic graft due to endocarditis (2007?), treated with antibiotics however patient unclear of infecting organism or therapy, post op leak repair, ascending aortic aneurysm, HTN, and s/p pacemaker who was referred for evaluation due to fever for 2-3 months. Mrs. Keyes reports fever from 101-103 F occurring at least 4 times per weeks with associated chills and night sweats. The fever occurs mostly at night and is associated with anorexia, and weight loss of 6 lbs in 2 months. She denies rashes, and other symptoms.     She does report a history of increased sputum production in March after mowing her grass however those symptoms resolved.     Mrs. Keyes does not have antibiotic allergies. She has a pet dog. She denies toxic habits. 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 [place]      Review of Systems   Constitutional: Positive for chills, decreased appetite, fever, malaise/fatigue, night sweats and weight loss. Negative for weight gain.   HENT: Positive for congestion, hoarse voice and tinnitus. Negative for ear pain, hearing loss and sore throat.    Eyes: Negative for blurred vision, redness and visual disturbance.   Cardiovascular: Positive for palpitations. Negative for chest pain and leg swelling.   Respiratory: Positive for shortness of breath and sputum production. Negative for cough, hemoptysis and wheezing.    Hematologic/Lymphatic: Negative for adenopathy. Does not bruise/bleed easily.   Skin: Negative for dry skin, itching, rash and suspicious lesions.   Musculoskeletal: Positive for back pain and muscle weakness. Negative for joint pain, myalgias and neck pain.   Gastrointestinal: Positive for diarrhea and nausea. Negative for abdominal pain, constipation,    Problem Relation Name Age of Onset    Eczema Mother      Stroke Father Richard     Lung cancer Father Richard     ADD / ADHD Father Richard     Other (environmental allergies) Other      Diabetes Other      Hypertension Other      Stroke Other Uncle     Heart disease Other Aunt         hole in heart    Heart disease Cousin          hole in herat    Dementia Maternal Grandmother Terrell     Stroke Maternal Grandmother Terrell     Dementia Mother's Brother Yang     Fibromyalgia Mother's Brother Yang     Stroke Mother's Brother Yang     Depression Sister Rachelle     Intellectual Disability Sister Rachelle         Allergies  Bee pollen, Kiwi, Pineapple, and Silver nitrate    Review of Systems  Review of Systems   Respiratory:  Negative for shortness of breath.    Cardiovascular:  Negative for chest pain.   Gastrointestinal:  Negative for blood in stool.   Musculoskeletal:  Positive for back pain and neck pain.   Psychiatric/Behavioral:  Negative for suicidal ideas.         Physical Exam  Physical Exam  Constitutional:       Appearance: Normal appearance.   HENT:      Head: Normocephalic and atraumatic.   Eyes:      Extraocular Movements: Extraocular movements intact.      Pupils: Pupils are equal, round, and reactive to light.   Cardiovascular:      Rate and Rhythm: Normal rate and regular rhythm.   Pulmonary:      Effort: Pulmonary effort is normal.   Abdominal:      Palpations: Abdomen is soft.   Musculoskeletal:      Cervical back: Neck supple.   Neurological:      Mental Status: Miguelina is alert.      Gait: Gait is intact.      Deep Tendon Reflexes:      Reflex Scores:       Tricep reflexes are 2+ on the right side and 2+ on the left side.       Bicep reflexes are 2+ on the right side and 2+ on the left side.       Brachioradialis reflexes are 2+ on the right side and 2+ on the left side.       Patellar reflexes are 2+ on the right side and 2+ on the left side.       Achilles reflexes are 2+ on the right  heartburn and vomiting.   Genitourinary: Positive for urgency. Negative for dysuria, flank pain, frequency, hematuria and hesitancy.   Neurological: Positive for numbness and paresthesias. Negative for dizziness, headaches and weakness.   Psychiatric/Behavioral: Negative for depression and memory loss. The patient does not have insomnia and is not nervous/anxious.      Objective:   Physical Exam  Vitals and nursing note reviewed.   Constitutional:       Appearance: She is well-developed.   HENT:      Head: Normocephalic and atraumatic.      Right Ear: External ear normal.      Left Ear: External ear normal.   Eyes:      General: No scleral icterus.        Right eye: No discharge.         Left eye: No discharge.      Conjunctiva/sclera: Conjunctivae normal.   Cardiovascular:      Rate and Rhythm: Normal rate and regular rhythm.      Heart sounds: Murmur heard.     No friction rub. No gallop.   Pulmonary:      Effort: Pulmonary effort is normal. No respiratory distress.      Breath sounds: Normal breath sounds. No stridor. No wheezing or rales.   Abdominal:      General: Bowel sounds are normal.   Musculoskeletal:         General: No tenderness. Normal range of motion.   Skin:     General: Skin is warm and dry.   Neurological:      Mental Status: She is alert and oriented to person, place, and time.       Assessment:       1. Fever, unspecified fever cause    2. S/P Mechanical AVR secondary to bacterial endocarditis -1997, St Patric valve    3. Pacemaker    4. Pulmonary nodules          Plan:   Patient with fever and pulmonary nodules of unclear etiology. In the setting of graft material would be concerned about an infectious etiology however it is unclear why this has not declared itself previously. She has had work up and cultures performed by Dr. Gallegos and are currently in process.   · Will hold antibiotics for now while awaiting work up.   · If blood cultures remain NGTD or if the patient develops systemic signs of  "side and 2+ on the left side.  Psychiatric:         Mood and Affect: Mood normal.           Last Recorded Vitals  Blood pressure (!) 129/91, pulse 85, temperature 37.2 °C (99 °F), resp. rate 18, height 1.727 m (5' 8\"), weight (!) 159 kg (350 lb), SpO2 100%.    Reviewed Images  Reviewed and independently interpreted MRI Lumbar spine severe spinal canal or neuroforaminal stenosis and MRI Thoracic spine no significant spinal canal or neuroforaminal narrowing    Reviewed Labs   Latest Reference Range & Units 09/07/24 10:02   WBC 4.4 - 11.3 x10*3/uL 5.9   nRBC 0.0 - 0.0 /100 WBCs 0.0   RBC 4.00 - 5.90 x10*6/uL 5.01   HEMOGLOBIN 12.0 - 17.5 g/dL 11.8 (L)   HEMATOCRIT 36.0 - 52.0 % 39.1   MCV 80 - 100 fL 78 (L)   MCH 26.0 - 34.0 pg 23.6 (L)   MCHC 32.0 - 36.0 g/dL 30.2 (L)   RED CELL DISTRIBUTION WIDTH 11.5 - 14.5 % 13.9   Platelets 150 - 450 x10*3/uL 377   Neutrophils % 40.0 - 80.0 % 57.2   Immature Granulocytes %, Automated 0.0 - 0.9 % 0.3   Lymphocytes % 13.0 - 44.0 % 31.0   Monocytes % 2.0 - 10.0 % 9.3   Eosinophils % 0.0 - 6.0 % 1.7   Basophils % 0.0 - 2.0 % 0.5   Neutrophils Absolute 1.20 - 7.70 x10*3/uL 3.39   Immature Granulocytes Absolute, Automated 0.00 - 0.70 x10*3/uL 0.02   Lymphocytes Absolute 1.20 - 4.80 x10*3/uL 1.84   Monocytes Absolute 0.10 - 1.00 x10*3/uL 0.55   Eosinophils Absolute 0.00 - 0.70 x10*3/uL 0.10   Basophils Absolute 0.00 - 0.10 x10*3/uL 0.03   (L): Data is abnormally low      Latest Reference Range & Units 09/07/24 10:02   GLUCOSE 74 - 99 mg/dL 95   SODIUM 136 - 145 mmol/L 138   POTASSIUM 3.5 - 5.3 mmol/L 4.5   CHLORIDE 98 - 107 mmol/L 101   Bicarbonate 21 - 32 mmol/L 28   Anion Gap 10 - 20 mmol/L 14   Blood Urea Nitrogen 6 - 23 mg/dL 9   Creatinine 0.50 - 1.30 mg/dL 0.79   EGFR >60 mL/min/1.73m*2 >90   Calcium 8.6 - 10.6 mg/dL 8.6     Assessment/Plan   Encounter Diagnoses   Name Primary?    Cervical stenosis of spine Yes    Kirsten           Miguelina Gloria is a 37 y.o. adult here for " illness then would start empiric broad spectrum antibiotics.   · If blood cultures become positive then will start therapy and treat for at least 6 weeks.   · Patient has had laboratory drawn today and wishes to defer further work up for a later time. I have sent a letter to her primary care provider with recommendations for the following testing: repeat blood cultures weekly x 2, Quantiferon Gold Tb or Tspot, Fungitell assay, histoplasma urine and serum antigen, and Blastomyces urine antigen.   · Also recommended AFB sputum smear and cultures if the patient develops a productive cough.   · RTC to be determined with above.     evaluation of widespread pain especially affecting her neck area, bilateral scapular area, mid back and low back pain.  She has been experiencing the symptoms for years has been worse since 2023 after she had a fall.  Since then she has done 16 session of physical therapy which made her pain worse.Reviewed the MRI of her lumbar spine and thoracic spine which did not reveal any severe spinal canal or neural salcedo narrowing.  The EMG does suggest presence of C8-T1 radiculopathy with evidence of active motor axon loss.  Last week she was ordered an MRI of her cervical spine which does reveal spinal canal narrowing at C4-5 with potential cord signal changes.  She also endorses some issues with her balance.  Given these finding I would recommend referral to spine surgery for consider surgical options if indicated.  Last visit she was also started on Celebrex, gabapentin without any significant benefit.  Will trial her on diclofenac.  Discussed risk benefits and alternatives.  Follow-up after evaluation by neurosurgery/spine surgery.      Sandeep Garcia MD

## 2025-06-24 ENCOUNTER — TELEPHONE (OUTPATIENT)
Dept: INFECTIOUS DISEASES | Facility: CLINIC | Age: 65
End: 2025-06-24
Payer: MEDICARE

## 2025-06-24 DIAGNOSIS — I38 PROSTHETIC VALVE ENDOCARDITIS, SUBSEQUENT ENCOUNTER: ICD-10-CM

## 2025-06-24 DIAGNOSIS — A49.1 STREPTOCOCCAL INFECTION: ICD-10-CM

## 2025-06-24 DIAGNOSIS — T82.6XXD PROSTHETIC VALVE ENDOCARDITIS, SUBSEQUENT ENCOUNTER: ICD-10-CM

## 2025-06-24 DIAGNOSIS — Z79.2 CHRONIC ANTIBIOTIC SUPPRESSION: ICD-10-CM

## 2025-06-24 RX ORDER — CEFADROXIL 500 MG/1
1 CAPSULE ORAL EVERY 12 HOURS
Qty: 120 CAPSULE | Refills: 11 | Status: SHIPPED | OUTPATIENT
Start: 2025-06-24 | End: 2026-06-24

## 2025-06-24 NOTE — TELEPHONE ENCOUNTER
Pt called again in regards to getting a refill for her medication. I explained to her that the provider was not in at this time but I will send her a message about what is going on. Pt stated she understands and will be waiting for medication refill.

## 2025-08-25 ENCOUNTER — TELEPHONE (OUTPATIENT)
Dept: INFECTIOUS DISEASES | Facility: CLINIC | Age: 65
End: 2025-08-25
Payer: MEDICARE

## 2025-08-29 ENCOUNTER — OFFICE VISIT (OUTPATIENT)
Dept: INFECTIOUS DISEASES | Facility: CLINIC | Age: 65
End: 2025-08-29
Payer: MEDICARE

## 2025-08-29 VITALS
HEART RATE: 72 BPM | HEIGHT: 63 IN | WEIGHT: 158.5 LBS | DIASTOLIC BLOOD PRESSURE: 63 MMHG | TEMPERATURE: 98 F | BODY MASS INDEX: 28.08 KG/M2 | SYSTOLIC BLOOD PRESSURE: 116 MMHG

## 2025-08-29 DIAGNOSIS — T82.7XXA PACEMAKER INFECTION, INITIAL ENCOUNTER: Primary | ICD-10-CM

## 2025-08-29 LAB
GRAM STN SPEC: NORMAL
GRAM STN SPEC: NORMAL

## 2025-08-29 PROCEDURE — 4010F ACE/ARB THERAPY RXD/TAKEN: CPT | Mod: CPTII,S$GLB,, | Performed by: INTERNAL MEDICINE

## 2025-08-29 PROCEDURE — 3074F SYST BP LT 130 MM HG: CPT | Mod: CPTII,S$GLB,, | Performed by: INTERNAL MEDICINE

## 2025-08-29 PROCEDURE — 87070 CULTURE OTHR SPECIMN AEROBIC: CPT | Performed by: INTERNAL MEDICINE

## 2025-08-29 PROCEDURE — 1160F RVW MEDS BY RX/DR IN RCRD: CPT | Mod: CPTII,S$GLB,, | Performed by: INTERNAL MEDICINE

## 2025-08-29 PROCEDURE — 1126F AMNT PAIN NOTED NONE PRSNT: CPT | Mod: CPTII,S$GLB,, | Performed by: INTERNAL MEDICINE

## 2025-08-29 PROCEDURE — 3288F FALL RISK ASSESSMENT DOCD: CPT | Mod: CPTII,S$GLB,, | Performed by: INTERNAL MEDICINE

## 2025-08-29 PROCEDURE — 99999 PR PBB SHADOW E&M-EST. PATIENT-LVL IV: CPT | Mod: PBBFAC,,, | Performed by: INTERNAL MEDICINE

## 2025-08-29 PROCEDURE — 1101F PT FALLS ASSESS-DOCD LE1/YR: CPT | Mod: CPTII,S$GLB,, | Performed by: INTERNAL MEDICINE

## 2025-08-29 PROCEDURE — G2211 COMPLEX E/M VISIT ADD ON: HCPCS | Mod: S$GLB,,, | Performed by: INTERNAL MEDICINE

## 2025-08-29 PROCEDURE — 3008F BODY MASS INDEX DOCD: CPT | Mod: CPTII,S$GLB,, | Performed by: INTERNAL MEDICINE

## 2025-08-29 PROCEDURE — 87075 CULTR BACTERIA EXCEPT BLOOD: CPT | Performed by: INTERNAL MEDICINE

## 2025-08-29 PROCEDURE — 3078F DIAST BP <80 MM HG: CPT | Mod: CPTII,S$GLB,, | Performed by: INTERNAL MEDICINE

## 2025-08-29 PROCEDURE — 87205 SMEAR GRAM STAIN: CPT | Performed by: INTERNAL MEDICINE

## 2025-08-29 PROCEDURE — 99214 OFFICE O/P EST MOD 30 MIN: CPT | Mod: S$GLB,,, | Performed by: INTERNAL MEDICINE

## 2025-08-29 PROCEDURE — 1159F MED LIST DOCD IN RCRD: CPT | Mod: CPTII,S$GLB,, | Performed by: INTERNAL MEDICINE

## 2025-08-29 RX ORDER — AMIODARONE HYDROCHLORIDE 200 MG/1
100 TABLET ORAL
COMMUNITY

## 2025-08-29 RX ORDER — AMLODIPINE BESYLATE 5 MG/1
TABLET ORAL
COMMUNITY
Start: 2025-02-14

## 2025-08-29 RX ORDER — MUPIROCIN 20 MG/G
OINTMENT TOPICAL
COMMUNITY
Start: 2025-07-29

## 2025-08-29 RX ORDER — IPRATROPIUM BROMIDE 42 UG/1
SPRAY, METERED NASAL
COMMUNITY
Start: 2025-07-03

## 2025-08-29 RX ORDER — MINOCYCLINE HYDROCHLORIDE 100 MG/1
1 CAPSULE ORAL
COMMUNITY
Start: 2025-08-22

## 2025-08-29 RX ORDER — ZOLPIDEM TARTRATE 10 MG/1
10 TABLET ORAL NIGHTLY PRN
COMMUNITY

## 2025-09-02 ENCOUNTER — TELEPHONE (OUTPATIENT)
Dept: INFECTIOUS DISEASES | Facility: CLINIC | Age: 65
End: 2025-09-02
Payer: MEDICARE

## 2025-09-02 LAB — BACTERIA SPEC AEROBE CULT: NO GROWTH

## 2025-09-03 ENCOUNTER — TELEPHONE (OUTPATIENT)
Dept: INFECTIOUS DISEASES | Facility: CLINIC | Age: 65
End: 2025-09-03
Payer: MEDICARE

## 2025-09-05 LAB — BACTERIA SPEC ANAEROBE CULT: NORMAL
